# Patient Record
Sex: MALE | Race: WHITE | NOT HISPANIC OR LATINO | Employment: FULL TIME | URBAN - METROPOLITAN AREA
[De-identification: names, ages, dates, MRNs, and addresses within clinical notes are randomized per-mention and may not be internally consistent; named-entity substitution may affect disease eponyms.]

---

## 2020-04-06 ENCOUNTER — OFFICE VISIT (OUTPATIENT)
Dept: INTERNAL MEDICINE CLINIC | Facility: CLINIC | Age: 55
End: 2020-04-06
Payer: COMMERCIAL

## 2020-04-06 VITALS
DIASTOLIC BLOOD PRESSURE: 90 MMHG | BODY MASS INDEX: 29.09 KG/M2 | HEIGHT: 70 IN | OXYGEN SATURATION: 99 % | HEART RATE: 75 BPM | SYSTOLIC BLOOD PRESSURE: 150 MMHG | WEIGHT: 203.2 LBS

## 2020-04-06 DIAGNOSIS — I10 ESSENTIAL HYPERTENSION: ICD-10-CM

## 2020-04-06 DIAGNOSIS — E11.69 DIABETES MELLITUS TYPE 2 IN OBESE (HCC): Primary | ICD-10-CM

## 2020-04-06 DIAGNOSIS — N52.1 ERECTILE DYSFUNCTION DUE TO DIABETES MELLITUS (HCC): ICD-10-CM

## 2020-04-06 DIAGNOSIS — E11.69 ERECTILE DYSFUNCTION DUE TO DIABETES MELLITUS (HCC): ICD-10-CM

## 2020-04-06 DIAGNOSIS — E66.9 DIABETES MELLITUS TYPE 2 IN OBESE (HCC): Primary | ICD-10-CM

## 2020-04-06 PROCEDURE — 99214 OFFICE O/P EST MOD 30 MIN: CPT | Performed by: INTERNAL MEDICINE

## 2020-04-06 RX ORDER — SILDENAFIL 50 MG/1
50 TABLET, FILM COATED ORAL 3 TIMES WEEKLY
Qty: 6 TABLET | Refills: 1 | Status: SHIPPED | OUTPATIENT
Start: 2020-04-06 | End: 2020-07-06

## 2020-06-04 DIAGNOSIS — E11.69 DIABETES MELLITUS TYPE 2 IN OBESE (HCC): Primary | ICD-10-CM

## 2020-06-04 DIAGNOSIS — E66.9 DIABETES MELLITUS TYPE 2 IN OBESE (HCC): Primary | ICD-10-CM

## 2020-06-11 DIAGNOSIS — E66.9 DIABETES MELLITUS TYPE 2 IN OBESE (HCC): Primary | ICD-10-CM

## 2020-06-11 DIAGNOSIS — E11.69 DIABETES MELLITUS TYPE 2 IN OBESE (HCC): Primary | ICD-10-CM

## 2020-06-15 ENCOUNTER — TELEPHONE (OUTPATIENT)
Dept: INTERNAL MEDICINE CLINIC | Facility: CLINIC | Age: 55
End: 2020-06-15

## 2020-06-15 DIAGNOSIS — E66.9 DIABETES MELLITUS TYPE 2 IN OBESE (HCC): Primary | ICD-10-CM

## 2020-06-15 DIAGNOSIS — E11.69 DIABETES MELLITUS TYPE 2 IN OBESE (HCC): Primary | ICD-10-CM

## 2020-07-03 DIAGNOSIS — E11.69 ERECTILE DYSFUNCTION DUE TO DIABETES MELLITUS (HCC): ICD-10-CM

## 2020-07-03 DIAGNOSIS — N52.1 ERECTILE DYSFUNCTION DUE TO DIABETES MELLITUS (HCC): ICD-10-CM

## 2020-07-06 RX ORDER — SILDENAFIL 50 MG/1
TABLET, FILM COATED ORAL
Qty: 6 TABLET | Refills: 1 | Status: SHIPPED | OUTPATIENT
Start: 2020-07-06 | End: 2020-08-04 | Stop reason: DRUGHIGH

## 2020-07-29 ENCOUNTER — TELEPHONE (OUTPATIENT)
Dept: OTHER | Facility: OTHER | Age: 55
End: 2020-07-29

## 2020-08-04 ENCOUNTER — OFFICE VISIT (OUTPATIENT)
Dept: INTERNAL MEDICINE CLINIC | Facility: CLINIC | Age: 55
End: 2020-08-04
Payer: COMMERCIAL

## 2020-08-04 VITALS
HEART RATE: 72 BPM | WEIGHT: 205.4 LBS | SYSTOLIC BLOOD PRESSURE: 128 MMHG | BODY MASS INDEX: 29.41 KG/M2 | OXYGEN SATURATION: 98 % | TEMPERATURE: 98.1 F | DIASTOLIC BLOOD PRESSURE: 86 MMHG | HEIGHT: 70 IN

## 2020-08-04 DIAGNOSIS — E66.9 DIABETES MELLITUS TYPE 2 IN OBESE (HCC): Primary | ICD-10-CM

## 2020-08-04 DIAGNOSIS — N52.1 ERECTILE DYSFUNCTION DUE TO DIABETES MELLITUS (HCC): ICD-10-CM

## 2020-08-04 DIAGNOSIS — I10 ESSENTIAL HYPERTENSION: ICD-10-CM

## 2020-08-04 DIAGNOSIS — E11.69 DIABETES MELLITUS TYPE 2 IN OBESE (HCC): Primary | ICD-10-CM

## 2020-08-04 DIAGNOSIS — E11.69 ERECTILE DYSFUNCTION DUE TO DIABETES MELLITUS (HCC): ICD-10-CM

## 2020-08-04 PROCEDURE — 1036F TOBACCO NON-USER: CPT | Performed by: INTERNAL MEDICINE

## 2020-08-04 PROCEDURE — 3079F DIAST BP 80-89 MM HG: CPT | Performed by: INTERNAL MEDICINE

## 2020-08-04 PROCEDURE — 3008F BODY MASS INDEX DOCD: CPT | Performed by: INTERNAL MEDICINE

## 2020-08-04 PROCEDURE — 99214 OFFICE O/P EST MOD 30 MIN: CPT | Performed by: INTERNAL MEDICINE

## 2020-08-04 PROCEDURE — 3074F SYST BP LT 130 MM HG: CPT | Performed by: INTERNAL MEDICINE

## 2020-08-04 PROCEDURE — 3725F SCREEN DEPRESSION PERFORMED: CPT | Performed by: INTERNAL MEDICINE

## 2020-08-04 RX ORDER — SILDENAFIL 100 MG/1
TABLET, FILM COATED ORAL
Qty: 10 TABLET | Refills: 1 | Status: SHIPPED | OUTPATIENT
Start: 2020-08-04 | End: 2021-02-04

## 2020-08-04 RX ORDER — GLUCOSAMINE HCL/CHONDROITIN SU 500-400 MG
CAPSULE ORAL
COMMUNITY
Start: 2016-09-23 | End: 2021-06-01 | Stop reason: SDUPTHER

## 2020-08-04 RX ORDER — OMEPRAZOLE 20 MG/1
CAPSULE, DELAYED RELEASE ORAL
COMMUNITY
Start: 2011-06-30 | End: 2021-05-12 | Stop reason: SDUPTHER

## 2020-08-04 RX ORDER — METFORMIN HYDROCHLORIDE EXTENDED-RELEASE TABLETS 1000 MG/1
TABLET, FILM COATED, EXTENDED RELEASE ORAL
COMMUNITY
Start: 2017-01-17 | End: 2020-11-20 | Stop reason: SDUPTHER

## 2020-08-04 NOTE — PROGRESS NOTES
Assessment/Plan:         Problem List Items Addressed This Visit        Endocrine    Diabetes mellitus type 2 in obese ([facility] ) - Primary       No results found for: HGBA1C   Did not go for labs- script was given  Will give new script  Relevant Medications    metFORMIN (FORTAMET) 1000 MG (OSM) 24 hr tablet    Other Relevant Orders    HEMOGLOBIN A1C W/ EAG ESTIMATION    Basic metabolic panel    Erectile dysfunction due to diabetes mellitus ([facility] )       No results found for: HGBA1C  viagra 50 mg helped 1st time, then not helping much  Tolerated well  testosteron did not help  Try vigra 100 mg 2x/week prn- s/e d/w pt again  Recheck testosteron level due to libido issues         Relevant Medications    metFORMIN (FORTAMET) 1000 MG (OSM) 24 hr tablet    sildenafil (VIAGRA) 100 mg tablet    Other Relevant Orders    Testosterone, free, total       Cardiovascular and Mediastinum    Essential hypertension     Controlled  Subjective:      Patient ID: Frank Brantley is a 49 y o  male  1  Dm-2-patient still did not go for blood work  I have given him counseling about importance of regular blood work checked  He tolerating medicine well  No polyuria or polydipsia  No hypoglycemia symptoms  No numbness or tingling  No chest pain or shortness of breath  No significant weight changes  No fatigue or weakness  No abdominal pain nausea or vomiting  No change in the vision  No headaches  No lightheadedness  2  htn-blood pressure under control with current medication lisinopril  Tolerating medicine well  No headache dizziness or lightheadedness  No lower extremity edema  No orthopnea  No chest pain or shortness of breath  No weakness or fatigue  No palpitations  No significant weight changes  He does not drink alcohol too much  3  ED-with loss of libido  Testosterone supplement did not help last time  I will recheck testosterone level    Viagra 50 mg helped 1st time but did not help on later   He tolerated medicine well  No depressed mood  No back pain  No lower extremity weakness  No hallucination  No suicidal or homicidal ideations  No anxiety or mental stress  We will try Viagra 100 mg twice a week peggy Isidro Possible side effects discussed with patient again  The following portions of the patient's history were reviewed and updated as appropriate:   He has a past medical history of Aortic aneurysm ([facility] ), Decreased testosterone level, Diabetes mellitus ([facility] ), GERD (gastroesophageal reflux disease), Hypertension, and Reduced libido  ,  does not have any pertinent problems on file  ,   has a past surgical history that includes Colonoscopy (2015)  ,  family history includes Hypertension in his father and mother  ,   reports that he has never smoked  He has never used smokeless tobacco  He reports that he does not drink alcohol or use drugs  ,  is allergic to penicillins and clindamycin     Current Outpatient Medications   Medication Sig Dispense Refill    atorvastatin (LIPITOR) 10 mg tablet Take 10 mg by mouth daily   canagliflozin (Invokana) 100 mg Take 1 tablet (100 mg total) by mouth daily 30 tablet 2    canagliflozin (Invokana) 100 mg Take 1 tablet (100 mg total) by mouth daily before breakfast 90 tablet 0    Glucose Blood (BLOOD GLUCOSE TEST STRIPS) STRP test BS twice daily as directed      HYDROcodone-acetaminophen (NORCO) 5-325 mg per tablet Take 1 tablet by mouth every 6 (six) hours as needed for moderate pain (Not relieved by Anti-inflammatory)  Max Daily Amount: 4 tablets 20 tablet 0    lisinopril (ZESTRIL) 10 mg tablet Take 10 mg by mouth daily   metFORMIN (FORTAMET) 1000 MG (OSM) 24 hr tablet TAKE 1 TABLET(1000 MG) BY MOUTH DAILY WITH BREAKFAST      metFORMIN (GLUCOPHAGE) 500 mg tablet Take 1,000 mg by mouth 2 (two) times a day with meals          omeprazole (PriLOSEC) 20 mg delayed release capsule TAKE 1 CAPSULE(20 MG) BY MOUTH DAILY      pioglitazone (ACTOS) 15 mg tablet Take 15 mg by mouth daily   sildenafil (VIAGRA) 100 mg tablet 1 po every 3 days prn 10 tablet 1     No current facility-administered medications for this visit  Review of Systems   Constitutional: Negative for chills, fatigue and fever  HENT: Negative for congestion, nosebleeds and rhinorrhea  Eyes: Negative for discharge and visual disturbance  Respiratory: Negative for cough, chest tightness, shortness of breath and wheezing  Cardiovascular: Negative for chest pain  Gastrointestinal: Negative for abdominal distention, abdominal pain, constipation, diarrhea and vomiting  Endocrine: Negative for polydipsia and polyuria  Genitourinary: Negative for difficulty urinating, frequency and hematuria  Musculoskeletal: Negative for arthralgias, back pain and myalgias  Skin: Negative for rash  Allergic/Immunologic: Negative for environmental allergies  Neurological: Negative for dizziness, seizures, syncope, facial asymmetry, weakness, light-headedness, numbness and headaches  Hematological: Negative  Psychiatric/Behavioral: Negative for agitation, confusion, decreased concentration, dysphoric mood and suicidal ideas  The patient is not nervous/anxious  All other systems reviewed and are negative  Objective:  Vitals:    08/04/20 1410   BP: 128/86   BP Location: Left arm   Patient Position: Sitting   Cuff Size: Adult   Pulse: 72   Temp: 98 1 °F (36 7 °C)   TempSrc: Temporal   SpO2: 98%   Weight: 93 2 kg (205 lb 6 4 oz)   Height: 5' 10"     Body mass index is 29 47 kg/m²  Physical Exam   Constitutional: He is oriented to person, place, and time  He appears well-developed  No distress  HENT:   Mouth/Throat: Mucous membranes are moist    Eyes: Right eye exhibits no discharge  Left eye exhibits no discharge  Neck: No thyromegaly present  Cardiovascular: Normal rate and regular rhythm  No murmur heard    Pulmonary/Chest: Effort normal and breath sounds normal  He has no wheezes  Abdominal: Soft  Bowel sounds are normal  He exhibits no mass  There is no abdominal tenderness  Musculoskeletal:         General: No swelling or tenderness  Right lower leg: No edema  Left lower leg: No edema  Lymphadenopathy:     He has no cervical adenopathy  Neurological: He is alert and oriented to person, place, and time  No sensory deficit  Skin: Capillary refill takes less than 2 seconds  No erythema     Psychiatric: His behavior is normal  Judgment and thought content normal

## 2020-08-04 NOTE — ASSESSMENT & PLAN NOTE
No results found for: HGBA1C  viagra 50 mg helped 1st time, then not helping much  Tolerated well  testosteron did not help  Try vigra 100 mg 2x/week prn- s/e d/w pt again   Recheck testosteron level due to libido issues

## 2020-10-09 DIAGNOSIS — E11.69 DIABETES MELLITUS TYPE 2 IN OBESE (HCC): Primary | ICD-10-CM

## 2020-10-09 DIAGNOSIS — E66.9 DIABETES MELLITUS TYPE 2 IN OBESE (HCC): Primary | ICD-10-CM

## 2020-10-09 RX ORDER — EMPAGLIFLOZIN 25 MG/1
25 TABLET, FILM COATED ORAL EVERY MORNING
Qty: 30 TABLET | Refills: 1 | Status: SHIPPED | OUTPATIENT
Start: 2020-10-09 | End: 2020-11-20 | Stop reason: SDUPTHER

## 2020-10-28 DIAGNOSIS — I10 ESSENTIAL HYPERTENSION: Primary | ICD-10-CM

## 2020-10-28 PROCEDURE — 4010F ACE/ARB THERAPY RXD/TAKEN: CPT | Performed by: INTERNAL MEDICINE

## 2020-10-28 RX ORDER — LISINOPRIL 10 MG/1
10 TABLET ORAL DAILY
Qty: 90 TABLET | Refills: 1 | Status: SHIPPED | OUTPATIENT
Start: 2020-10-28 | End: 2021-08-30 | Stop reason: SDUPTHER

## 2020-11-20 ENCOUNTER — OFFICE VISIT (OUTPATIENT)
Dept: FAMILY MEDICINE CLINIC | Facility: CLINIC | Age: 55
End: 2020-11-20
Payer: COMMERCIAL

## 2020-11-20 VITALS
SYSTOLIC BLOOD PRESSURE: 120 MMHG | HEIGHT: 70 IN | RESPIRATION RATE: 16 BRPM | WEIGHT: 204 LBS | OXYGEN SATURATION: 98 % | DIASTOLIC BLOOD PRESSURE: 76 MMHG | BODY MASS INDEX: 29.2 KG/M2 | HEART RATE: 72 BPM | TEMPERATURE: 98.2 F

## 2020-11-20 DIAGNOSIS — E11.69 DIABETES MELLITUS TYPE 2 IN OBESE (HCC): Primary | ICD-10-CM

## 2020-11-20 DIAGNOSIS — I10 ESSENTIAL HYPERTENSION: ICD-10-CM

## 2020-11-20 DIAGNOSIS — E66.9 DIABETES MELLITUS TYPE 2 IN OBESE (HCC): Primary | ICD-10-CM

## 2020-11-20 LAB
SL AMB POCT HEMOGLOBIN AIC: 10.3 (ref ?–6.5)
SL AMB POCT HGB: 258

## 2020-11-20 PROCEDURE — 3078F DIAST BP <80 MM HG: CPT | Performed by: INTERNAL MEDICINE

## 2020-11-20 PROCEDURE — 85018 HEMOGLOBIN: CPT | Performed by: INTERNAL MEDICINE

## 2020-11-20 PROCEDURE — 1036F TOBACCO NON-USER: CPT | Performed by: INTERNAL MEDICINE

## 2020-11-20 PROCEDURE — 83036 HEMOGLOBIN GLYCOSYLATED A1C: CPT | Performed by: INTERNAL MEDICINE

## 2020-11-20 PROCEDURE — 3008F BODY MASS INDEX DOCD: CPT | Performed by: INTERNAL MEDICINE

## 2020-11-20 PROCEDURE — 99214 OFFICE O/P EST MOD 30 MIN: CPT | Performed by: INTERNAL MEDICINE

## 2020-11-20 PROCEDURE — 3046F HEMOGLOBIN A1C LEVEL >9.0%: CPT | Performed by: INTERNAL MEDICINE

## 2020-11-20 PROCEDURE — 3074F SYST BP LT 130 MM HG: CPT | Performed by: INTERNAL MEDICINE

## 2020-11-20 RX ORDER — METFORMIN HYDROCHLORIDE EXTENDED-RELEASE TABLETS 1000 MG/1
1000 TABLET, FILM COATED, EXTENDED RELEASE ORAL 2 TIMES DAILY WITH MEALS
Qty: 180 TABLET | Refills: 1 | Status: SHIPPED | OUTPATIENT
Start: 2020-11-20 | End: 2021-01-14

## 2020-11-20 RX ORDER — EMPAGLIFLOZIN 25 MG/1
25 TABLET, FILM COATED ORAL EVERY MORNING
Qty: 30 TABLET | Refills: 5 | Status: SHIPPED | OUTPATIENT
Start: 2020-11-20 | End: 2020-12-28 | Stop reason: SDUPTHER

## 2020-11-20 RX ORDER — PIOGLITAZONEHYDROCHLORIDE 15 MG/1
15 TABLET ORAL DAILY
Qty: 90 TABLET | Refills: 1 | Status: SHIPPED | OUTPATIENT
Start: 2020-11-20 | End: 2021-05-12 | Stop reason: SDUPTHER

## 2020-11-20 RX ORDER — ATORVASTATIN CALCIUM 10 MG/1
10 TABLET, FILM COATED ORAL DAILY
Qty: 90 TABLET | Refills: 1 | Status: SHIPPED | OUTPATIENT
Start: 2020-11-20

## 2020-11-23 ENCOUNTER — TELEPHONE (OUTPATIENT)
Dept: FAMILY MEDICINE CLINIC | Facility: CLINIC | Age: 55
End: 2020-11-23

## 2020-12-16 PROBLEM — E11.65 TYPE 2 DIABETES MELLITUS WITH HYPERGLYCEMIA (HCC): Status: ACTIVE | Noted: 2020-12-16

## 2020-12-28 DIAGNOSIS — E66.9 DIABETES MELLITUS TYPE 2 IN OBESE (HCC): ICD-10-CM

## 2020-12-28 DIAGNOSIS — E11.69 DIABETES MELLITUS TYPE 2 IN OBESE (HCC): ICD-10-CM

## 2021-01-03 RX ORDER — EMPAGLIFLOZIN 25 MG/1
25 TABLET, FILM COATED ORAL EVERY MORNING
Qty: 30 TABLET | Refills: 5
Start: 2021-01-03 | End: 2021-08-30 | Stop reason: SDUPTHER

## 2021-01-10 ENCOUNTER — APPOINTMENT (EMERGENCY)
Dept: RADIOLOGY | Facility: HOSPITAL | Age: 56
End: 2021-01-10
Payer: COMMERCIAL

## 2021-01-10 ENCOUNTER — HOSPITAL ENCOUNTER (EMERGENCY)
Facility: HOSPITAL | Age: 56
Discharge: HOME | End: 2021-01-10
Attending: EMERGENCY MEDICINE | Admitting: EMERGENCY MEDICINE
Payer: COMMERCIAL

## 2021-01-10 VITALS
DIASTOLIC BLOOD PRESSURE: 76 MMHG | HEART RATE: 74 BPM | RESPIRATION RATE: 20 BRPM | OXYGEN SATURATION: 99 % | SYSTOLIC BLOOD PRESSURE: 137 MMHG | TEMPERATURE: 97.1 F

## 2021-01-10 DIAGNOSIS — R07.9 CHEST PAIN: ICD-10-CM

## 2021-01-10 DIAGNOSIS — R53.1 WEAKNESS: Primary | ICD-10-CM

## 2021-01-10 DIAGNOSIS — I71.2 ANEURYSM OF THORACIC AORTA (HCC): ICD-10-CM

## 2021-01-10 DIAGNOSIS — R55 SYNCOPE: ICD-10-CM

## 2021-01-10 DIAGNOSIS — R41.82 AMS (ALTERED MENTAL STATUS): ICD-10-CM

## 2021-01-10 LAB
ALBUMIN SERPL BCP-MCNC: 3.5 G/DL (ref 3.5–5)
ALP SERPL-CCNC: 85 U/L (ref 46–116)
ALT SERPL W P-5'-P-CCNC: 23 U/L (ref 12–78)
ANION GAP SERPL CALCULATED.3IONS-SCNC: 9 MMOL/L (ref 4–13)
AST SERPL W P-5'-P-CCNC: 9 U/L (ref 5–45)
ATRIAL RATE: 90 BPM
BASOPHILS # BLD AUTO: 0.07 THOUSANDS/ΜL (ref 0–0.1)
BASOPHILS NFR BLD AUTO: 1 % (ref 0–1)
BILIRUB SERPL-MCNC: 0.45 MG/DL (ref 0.2–1)
BUN SERPL-MCNC: 20 MG/DL (ref 5–25)
CALCIUM SERPL-MCNC: 9.3 MG/DL (ref 8.3–10.1)
CHLORIDE SERPL-SCNC: 107 MMOL/L (ref 100–108)
CO2 SERPL-SCNC: 22 MMOL/L (ref 21–32)
CREAT SERPL-MCNC: 1.07 MG/DL (ref 0.6–1.3)
EOSINOPHIL # BLD AUTO: 0.06 THOUSAND/ΜL (ref 0–0.61)
EOSINOPHIL NFR BLD AUTO: 1 % (ref 0–6)
ERYTHROCYTE [DISTWIDTH] IN BLOOD BY AUTOMATED COUNT: 13.3 % (ref 11.6–15.1)
FLUAV RNA RESP QL NAA+PROBE: NEGATIVE
FLUBV RNA RESP QL NAA+PROBE: NEGATIVE
GFR SERPL CREATININE-BSD FRML MDRD: 78 ML/MIN/1.73SQ M
GLUCOSE SERPL-MCNC: 301 MG/DL (ref 65–140)
HCT VFR BLD AUTO: 49.7 % (ref 36.5–49.3)
HGB BLD-MCNC: 15.7 G/DL (ref 12–17)
IMM GRANULOCYTES # BLD AUTO: 0.03 THOUSAND/UL (ref 0–0.2)
IMM GRANULOCYTES NFR BLD AUTO: 1 % (ref 0–2)
LYMPHOCYTES # BLD AUTO: 1.97 THOUSANDS/ΜL (ref 0.6–4.47)
LYMPHOCYTES NFR BLD AUTO: 33 % (ref 14–44)
MCH RBC QN AUTO: 25.7 PG (ref 26.8–34.3)
MCHC RBC AUTO-ENTMCNC: 31.6 G/DL (ref 31.4–37.4)
MCV RBC AUTO: 81 FL (ref 82–98)
MONOCYTES # BLD AUTO: 0.44 THOUSAND/ΜL (ref 0.17–1.22)
MONOCYTES NFR BLD AUTO: 7 % (ref 4–12)
NEUTROPHILS # BLD AUTO: 3.5 THOUSANDS/ΜL (ref 1.85–7.62)
NEUTS SEG NFR BLD AUTO: 57 % (ref 43–75)
NRBC BLD AUTO-RTO: 0 /100 WBCS
P AXIS: 70 DEGREES
PLATELET # BLD AUTO: 265 THOUSANDS/UL (ref 149–390)
PMV BLD AUTO: 11.5 FL (ref 8.9–12.7)
POTASSIUM SERPL-SCNC: 3.4 MMOL/L (ref 3.5–5.3)
PR INTERVAL: 164 MS
PROT SERPL-MCNC: 7.8 G/DL (ref 6.4–8.2)
QRS AXIS: 17 DEGREES
QRSD INTERVAL: 74 MS
QT INTERVAL: 330 MS
QTC INTERVAL: 403 MS
RBC # BLD AUTO: 6.11 MILLION/UL (ref 3.88–5.62)
RSV RNA RESP QL NAA+PROBE: NEGATIVE
SARS-COV-2 RNA RESP QL NAA+PROBE: NEGATIVE
SODIUM SERPL-SCNC: 138 MMOL/L (ref 136–145)
T WAVE AXIS: 61 DEGREES
TROPONIN I SERPL-MCNC: <0.02 NG/ML
VENTRICULAR RATE: 90 BPM
WBC # BLD AUTO: 6.07 THOUSAND/UL (ref 4.31–10.16)

## 2021-01-10 PROCEDURE — 70498 CT ANGIOGRAPHY NECK: CPT

## 2021-01-10 PROCEDURE — 85025 COMPLETE CBC W/AUTO DIFF WBC: CPT | Performed by: EMERGENCY MEDICINE

## 2021-01-10 PROCEDURE — 0241U HB NFCT DS VIR RESP RNA 4 TRGT: CPT | Performed by: EMERGENCY MEDICINE

## 2021-01-10 PROCEDURE — 80053 COMPREHEN METABOLIC PANEL: CPT | Performed by: EMERGENCY MEDICINE

## 2021-01-10 PROCEDURE — 93010 ELECTROCARDIOGRAM REPORT: CPT | Performed by: INTERNAL MEDICINE

## 2021-01-10 PROCEDURE — 71045 X-RAY EXAM CHEST 1 VIEW: CPT

## 2021-01-10 PROCEDURE — 93005 ELECTROCARDIOGRAM TRACING: CPT

## 2021-01-10 PROCEDURE — 84484 ASSAY OF TROPONIN QUANT: CPT | Performed by: EMERGENCY MEDICINE

## 2021-01-10 PROCEDURE — 96361 HYDRATE IV INFUSION ADD-ON: CPT

## 2021-01-10 PROCEDURE — 96360 HYDRATION IV INFUSION INIT: CPT

## 2021-01-10 PROCEDURE — 36415 COLL VENOUS BLD VENIPUNCTURE: CPT | Performed by: EMERGENCY MEDICINE

## 2021-01-10 PROCEDURE — 99285 EMERGENCY DEPT VISIT HI MDM: CPT | Performed by: EMERGENCY MEDICINE

## 2021-01-10 PROCEDURE — 70496 CT ANGIOGRAPHY HEAD: CPT

## 2021-01-10 PROCEDURE — G1004 CDSM NDSC: HCPCS

## 2021-01-10 PROCEDURE — 99284 EMERGENCY DEPT VISIT MOD MDM: CPT

## 2021-01-10 RX ADMIN — SODIUM CHLORIDE 1000 ML: 0.9 INJECTION, SOLUTION INTRAVENOUS at 17:29

## 2021-01-10 RX ADMIN — IOHEXOL 85 ML: 350 INJECTION, SOLUTION INTRAVENOUS at 18:17

## 2021-01-10 NOTE — ED ATTENDING ATTESTATION
1/10/2021  IMari MD, saw and evaluated the patient  I have discussed the patient with the resident/non-physician practitioner and agree with the resident's/non-physician practitioner's findings, Plan of Care, and MDM as documented in the resident's/non-physician practitioner's note, except where noted  All available labs and Radiology studies were reviewed  I was present for key portions of any procedure(s) performed by the resident/non-physician practitioner and I was immediately available to provide assistance  At this point I agree with the current assessment done in the Emergency Department  I have conducted an independent evaluation of this patient a history and physical is as follows:  Unreliable historian  Per EMS and per wife, patient was found on the ground writhing around  Patient was speaking [place] a baby, and acting bizarrely  Patient states that he has not felt well for 2 weeks  States that he has been having some shortness of breath and chest discomfort  States that he feels globally weak  Patient's review of systems is very unreliable, patient is slow to answer, and does not answer all questions  Patient does have history of diabetes and hyperlipidemia  On exam the patient has stable vital signs  He has normal work of breathing and good color  The patient is able to attend, and has intact conjugate gaze  His face is symmetric  He has no signs of trauma to the head or neck  His neck is supple and nontender  The patient's heart is regular without murmurs, rubs, or gallops  His lungs are clear with good air movement  His abdomen is soft and nontender without masses, rebound, or guarding  Extremities are intact with no lateralizing edema  He has good pulses  The patient is neurologically nonfocal, but seems globally weak and requires some assistance to stand  Impression:  Acute altered mental status, shortness of breath, chest discomfort    The cardiac evaluation, CT CTA neck, COVID swab, reassess    ED Course         Critical Care Time  Procedures

## 2021-01-10 NOTE — Clinical Note
Jean-Pierresarahdionte Mattson was seen and treated in our emergency department on 1/10/2021  Diagnosis:     Boris Powell  may return to work on return date  He may return on this date: 01/12/2021         If you have any questions or concerns, please don't hesitate to call        Lisa Cuevas MD    ______________________________           _______________          _______________  Hospital Representative                              Date                                Time

## 2021-01-10 NOTE — Clinical Note
accompanied Narendra Alvarado to the emergency department on 1/10/2021  Return date if applicable: 63/10/9319        If you have any questions or concerns, please don't hesitate to call        Mary Villanueva MD

## 2021-01-10 NOTE — ED NOTES
Per EMS, patient was found writhing on the floor refusing to speak to anyone  Patient became aggressive in ambulance but was able to be verbally deescelated  At the time of 911 activation pt was smoking hookah, denies drug/alcohol use today  Patient is adamant about not wanting his wife present        Katja Rodarte RN  01/10/21 4445

## 2021-01-11 NOTE — DISCHARGE INSTRUCTIONS
Patient Instructions: Today you were seen in the emergency department for possible shaking vs syncope  We reviewed your CT and your labs and determined that you would be able to be discharged  You should follow up with your PCP  Please return to the emergency department if your symptoms get worse, you develop a fever, or you have any other symptoms we discussed today prior to discharge  Nice to meet you! Best of luck with everything!

## 2021-01-11 NOTE — ED PROVIDER NOTES
Final Diagnosis:  1  Weakness    2  Aneurysm of thoracic aorta ([address] )    3  Syncope    4  AMS (altered mental status)    5  Chest pain      ED Course as of Diego 10 2144   Albino Scale Diego 10, 2021   1723 Procedure Note: EKG  Date/Time: 01/10/21 1700   Interpreted by: Cierra Mendes  Indications / Diagnosis: CP  ECG reviewed by me, the ED Provider: yes   The EKG demonstrates:  Rhythm: normal sinus  Intervals: normal intervals  Axis: normal axis  QRS/Blocks: normal QRS  ST Changes: No acute ST Changes, no STD/MICAELA           1857 SARS-COV-2: Negative   1906 Stable 4 6 cm aneurysm of the ascending thoracic aorta  CTA head and neck with and without contrast     Chief Complaint   Patient presents with    Medical Problem     [facility] s/p episode of "writhing on the floor like a temper tantrum",  for EMS, ETCO2 22  pt without physical complaints  ASSESSMENT + PLAN:   - Nursing note reviewed  1  Syncope?/AMS  -patient apparently passed out was found on the floor  -some potential neurologic symptoms will order CTA  -basic labs including cardiac workup  -IV fluids  -Chest x-ray  -reassessment  -negative imaging and labs  -patient able to ambulate and feeling much improved    Final Dispo   Patient was reassessed  Vital signs stable  Patient and/or family given discharge instructions and return precautions  Patient and/or family was reassured  The patient and/or family vocalizes understanding  Answered all of the patient's and/or family's questions  Will follow up with PCP  Patient and/or family are agreeable to the plan          Medications   sodium chloride 0 9 % bolus 1,000 mL (0 mL Intravenous Stopped 1/10/21 1929)   iohexol (OMNIPAQUE) 350 MG/ML injection (MULTI-DOSE) 85 mL (85 mL Intravenous Given 1/10/21 1817)     Time reflects when diagnosis was documented in both MDM as applicable and the Disposition within this note     Time User Action Codes Description Comment    1/10/2021  8:10 PM Luis Tim Abo [I71 2] Aneurysm of thoracic aorta ([facility] [address] )     1/10/2021  8:11 PM Nay Brigido Add [R55] Syncope     1/10/2021  8:11 PM Nay Brigido Add [R41 82] AMS (altered mental status)     1/10/2021  8:11 PM HaZack richards Modify [I71 2] Aneurysm of thoracic aorta ([facility] [address] )     1/10/2021  8:11 PM HaZack richards Modify [R55] Syncope     1/10/2021  8:11 PM Nay Brigido Add [R07 9] Chest pain     1/10/2021  9:43 PM HaZack richards Add [R53 1] Weakness     1/10/2021  9:43 PM Nay Brigido Modify [R55] Syncope     1/10/2021  9:43 PM Nay Brigido Modify [R53 1] Weakness       ED Disposition     ED Disposition Condition Date/Time Comment    Discharge Stable Sun Diego 10, 2021 Jyoti Lee discharge to home/self care  Follow-up Information     Follow up With Specialties Details Why [address], [place] Internal Medicine   [place] [phone]          Discharge Medication List as of 1/10/2021  8:12 PM      CONTINUE these medications which have NOT CHANGED    Details   atorvastatin (LIPITOR) 10 mg tablet Take 1 tablet (10 mg total) by mouth daily, Starting Fri 11/20/2020, Normal      Empagliflozin (Jardiance) 25 MG TABS Take 1 tablet (25 mg total) by mouth every morning, Starting Sun 1/3/2021, Until Tue 2/2/2021, No Print      Glucose Blood (BLOOD GLUCOSE TEST STRIPS) STRP test BS twice daily as directed, Historical Med      HYDROcodone-acetaminophen (NORCO) 5-325 mg per tablet Take 1 tablet by mouth every 6 (six) hours as needed for moderate pain (Not relieved by Anti-inflammatory)   Max Daily Amount: 4 tablets, Starting Sat 1/23/2016, Print      lisinopril (ZESTRIL) 10 mg tablet Take 1 tablet (10 mg total) by mouth daily, Starting Wed 10/28/2020, Normal      metFORMIN (FORTAMET) 1000 MG (OSM) 24 hr tablet Take 1 tablet (1,000 mg total) by mouth 2 (two) times a day with meals, Starting Fri 11/20/2020, Normal      omeprazole (PriLOSEC) 20 mg delayed release capsule TAKE 1 CAPSULE(20 MG) BY MOUTH DAILY, Historical Med      pioglitazone (ACTOS) 15 mg tablet Take 1 tablet (15 mg total) by mouth daily, Starting Fri 2020, Normal      sildenafil (VIAGRA) 100 mg tablet 1 po every 3 days prn, Normal           No discharge procedures on file  Prior to Admission Medications   Prescriptions Last Dose Informant Patient Reported? Taking? Empagliflozin (Jardiance) 25 MG TABS   No No   Sig: Take 1 tablet (25 mg total) by mouth every morning   Glucose Blood (BLOOD GLUCOSE TEST STRIPS) STRP   Yes No   Sig: test BS twice daily as directed   HYDROcodone-acetaminophen (NORCO) 5-325 mg per tablet   No No   Sig: Take 1 tablet by mouth every 6 (six) hours as needed for moderate pain (Not relieved by Anti-inflammatory)  Max Daily Amount: 4 tablets   Patient not taking: Reported on 2020   atorvastatin (LIPITOR) 10 mg tablet   No No   Sig: Take 1 tablet (10 mg total) by mouth daily   lisinopril (ZESTRIL) 10 mg tablet   No No   Sig: Take 1 tablet (10 mg total) by mouth daily   metFORMIN (FORTAMET) 1000 MG (OSM) 24 hr tablet   No No   Sig: Take 1 tablet (1,000 mg total) by mouth 2 (two) times a day with meals   omeprazole (PriLOSEC) 20 mg delayed release capsule   Yes No   Sig: TAKE 1 CAPSULE(20 MG) BY MOUTH DAILY   pioglitazone (ACTOS) 15 mg tablet   No No   Sig: Take 1 tablet (15 mg total) by mouth daily   sildenafil (VIAGRA) 100 mg tablet   No No   Si po every 3 days prn      Facility-Administered Medications: None       History of Present Illness: This is a 48 y o  male presenting today for evaluation of possible syncopal versus shaking  Does not sound like the patient a seizure as he did not postictal period, bladder incontinence or tongue biting  Patient says that for the past week he has had generalized weakness  Says that not been feeling as well  Denies any recent fever or chills  Denies any nausea or vomiting  No diarrhea      For his wife, patient was found on the floor  He may have had some shaking  His son found him and said that he was speaking like a baby  He was also having generalized shaking but no mental status change  - No language barrier    - History obtained from patient and chart   - There are no limitations to the history obtained  - Previous charting was reviewed  Some data reviewed included below for ease of access whether or not it is relevant to this patient encounter  Past Medical, Past Surgical History:    has a past medical history of Aortic aneurysm ([facility] [address] ), Decreased testosterone level, Diabetes mellitus ([facility] [address] ), GERD (gastroesophageal reflux disease), High cholesterol, Hyperlipidemia, Hypertension, and Reduced libido    has a past surgical history that includes Colonoscopy (2015)  Allergies: Allergies   Allergen Reactions    Penicillins Swelling    Clindamycin Hives       Social and Family History:     Social History     Substance and Sexual Activity   Alcohol Use No     Social History     Tobacco Use   Smoking Status Current Some Day Smoker   Smokeless Tobacco Never Used   Tobacco Comment    hookah, today      Social History     Substance and Sexual Activity   Drug Use No       Review of Systems:   Review of Systems   Constitutional: Negative for chills, diaphoresis and fever  HENT: Negative  Eyes: Negative  Negative for visual disturbance  Respiratory: Positive for shortness of breath  Cardiovascular: Positive for chest pain  Gastrointestinal: Negative  Negative for abdominal pain, nausea and vomiting  Endocrine: Negative  Genitourinary: Negative  Musculoskeletal: Negative  Negative for myalgias  Skin: Negative  Negative for rash  Allergic/Immunologic: Negative  Neurological: Positive for syncope and weakness  Negative for light-headedness and numbness  Hematological: Negative  Psychiatric/Behavioral: Negative  All other systems reviewed and are negative         Physical Examination Vitals:    01/10/21 1637 01/10/21 1800 01/10/21 1900   BP: 162/96 160/100 137/76   BP Location:  Right arm Right arm   Pulse: 94 74 74   Resp: 20 20 20   Temp: (!) 97 1 °F (36 2 °C)     TempSrc: Tympanic     SpO2: 100% 98% 99%     Vitals reviewed by me  Physical Exam  Vitals signs and nursing note reviewed  Constitutional:       Appearance: He is well-developed  HENT:      Head: Normocephalic and atraumatic  Right Ear: External ear normal       Left Ear: External ear normal    Eyes:      Conjunctiva/sclera: Conjunctivae normal       Pupils: Pupils are equal, round, and reactive to light  Neck:      Musculoskeletal: Normal range of motion and neck supple  Cardiovascular:      Rate and Rhythm: Normal rate  Pulmonary:      Effort: Pulmonary effort is normal       Breath sounds: Normal breath sounds  Abdominal:      General: There is no distension  Palpations: Abdomen is soft  Tenderness: There is no abdominal tenderness  Musculoskeletal: Normal range of motion  Skin:     General: Skin is warm and dry  Neurological:      Mental Status: He is alert and oriented to person, place, and time  Cranial Nerves: No cranial nerve deficit  Sensory: No sensory deficit  Motor: No abnormal muscle tone        Coordination: Coordination normal       Deep Tendon Reflexes: Reflexes normal       Comments: Difficulty ambulating at first; able to ambulate without problems on reassessment          HEART Risk Score      Most Recent Value   Heart Score Risk Calculator   History  0 Filed at: 01/10/2021 2141   ECG  0 Filed at: 01/10/2021 2141   Age  1 Filed at: 01/10/2021 2141   Risk Factors  1 Filed at: 01/10/2021 2141   Troponin  0 Filed at: 01/10/2021 2141   HEART Score  2 Filed at: 01/10/2021 2141                       ED Course as of Diego 10 2144   Sun Diego 10, 2021   1723 Procedure Note: EKG  Date/Time: 01/10/21 1700   Interpreted by: Alysia Mobley  Indications / Diagnosis: CP  ECG reviewed by me, the ED Provider: yes   The EKG demonstrates:  Rhythm: normal sinus  Intervals: normal intervals  Axis: normal axis  QRS/Blocks: normal QRS  ST Changes: No acute ST Changes, no STD/MICAELA           1857 SARS-COV-2: Negative   1906 Stable 4 6 cm aneurysm of the ascending thoracic aorta  CTA head and neck with and without contrast     CTA head and neck with and without contrast   Final Result      No acute intracranial pathology  No significant stenosis of the cervical carotid or vertebral arteries   No significant intracranial stenosis, vessel occlusion or aneurysm  Stable 4 6 cm aneurysm of the ascending thoracic aorta                 Workstation performed: [id number]         XR chest 1 view portable    (Results Pending)     Orders Placed This Encounter   Procedures    COVID19, Influenza A/B, RSV PCR, [facility]    CTA head and neck with and without contrast    XR chest 1 view portable    CBC and differential    Troponin I    Comprehensive metabolic panel    EKG RESULTS    ECG 12 lead    ECG 12 lead       Labs:     Labs Reviewed   CBC AND DIFFERENTIAL - Abnormal       Result Value Ref Range Status    WBC 6 07  4 31 - 10 16 Thousand/uL Final    RBC 6 11 (*) 3 88 - 5 62 Million/uL Final    Hemoglobin 15 7  12 0 - 17 0 g/dL Final    Hematocrit 49 7 (*) 36 5 - 49 3 % Final    MCV 81 (*) 82 - 98 fL Final    MCH 25 7 (*) 26 8 - 34 3 pg Final    MCHC 31 6  31 4 - 37 4 g/dL Final    RDW 13 3  11 6 - 15 1 % Final    MPV 11 5  8 9 - 12 7 fL Final    Platelets 080  574 - 390 Thousands/uL Final    nRBC 0  /100 WBCs Final    Neutrophils Relative 57  43 - 75 % Final    Immat GRANS % 1  0 - 2 % Final    Lymphocytes Relative 33  14 - 44 % Final    Monocytes Relative 7  4 - 12 % Final    Eosinophils Relative 1  0 - 6 % Final    Basophils Relative 1  0 - 1 % Final    Neutrophils Absolute 3 50  1 85 - 7 62 Thousands/µL Final    Immature Grans Absolute 0 03  0 00 - 0 20 Thousand/uL Final    Lymphocytes Absolute 1 97  0 60 - 4 47 Thousands/µL Final    Monocytes Absolute 0 44  0 17 - 1 22 Thousand/µL Final    Eosinophils Absolute 0 06  0 00 - 0 61 Thousand/µL Final    Basophils Absolute 0 07  0 00 - 0 10 Thousands/µL Final   COMPREHENSIVE METABOLIC PANEL - Abnormal    Sodium 138  136 - 145 mmol/L Final    Potassium 3 4 (*) 3 5 - 5 3 mmol/L Final    Chloride 107  100 - 108 mmol/L Final    CO2 22  21 - 32 mmol/L Final    ANION GAP 9  4 - 13 mmol/L Final    BUN 20  5 - 25 mg/dL Final    Creatinine 1 07  0 60 - 1 30 mg/dL Final    Comment: Standardized to IDMS reference method    Glucose 301 (*) 65 - 140 mg/dL Final    Comment: If the patient is fasting, the ADA then defines impaired fasting glucose as > 100 mg/dL and diabetes as > or equal to 123 mg/dL  Specimen collection should occur prior to Sulfasalazine administration due to the potential for falsely depressed results  Specimen collection should occur prior to Sulfapyridine administration due to the potential for falsely elevated results  Calcium 9 3  8 3 - 10 1 mg/dL Final    AST 9  5 - 45 U/L Final    Comment: Specimen collection should occur prior to Sulfasalazine administration due to the potential for falsely depressed results  ALT 23  12 - 78 U/L Final    Comment: Specimen collection should occur prior to Sulfasalazine and/or Sulfapyridine administration due to the potential for falsely depressed results  Alkaline Phosphatase 85  46 - 116 U/L Final    Total Protein 7 8  6 4 - 8 2 g/dL Final    Albumin 3 5  3 5 - 5 0 g/dL Final    Total Bilirubin 0 45  0 20 - 1 00 mg/dL Final    Comment: Use of this assay is not recommended for patients undergoing treatment with eltrombopag due to the potential for falsely elevated results      eGFR 78  ml/min/1 73sq m Final    Narrative:     [place] guidelines for Chronic Kidney Disease (CKD):     Stage 1 with normal or high GFR (GFR > 90 mL/min/1 73 square meters)    Stage 2 Mild CKD (GFR = 60-89 mL/min/1 73 square meters)    Stage 3A Moderate CKD (GFR = 45-59 mL/min/1 73 square meters)    Stage 3B Moderate CKD (GFR = 30-44 mL/min/1 73 square meters)    Stage 4 Severe CKD (GFR = 15-29 mL/min/1 73 square meters)    Stage 5 End Stage CKD (GFR <15 mL/min/1 73 square meters)  Note: GFR calculation is accurate only with a steady state creatinine   COVID19, INFLUENZA A/B, RSV PCR, [facility] - Normal    SARS-CoV-2 Negative  Negative Final    INFLUENZA A PCR Negative  Negative Final    INFLUENZA B PCR Negative  Negative Final    RSV PCR Negative  Negative Final    Narrative: This test has been authorized by FDA under an EUA (Emergency Use Assay) for use by authorized laboratories  Clinical caution and judgement should be used with the interpretation of these results with consideration of the clinical impression and other laboratory testing  Testing reported as "Positive" or "Negative" has been proven to be accurate according to standard laboratory validation requirements  All testing is performed with control materials showing appropriate reactivity at standard intervals  TROPONIN I - Normal    Troponin I <0 02  <=0 04 ng/mL Final    Comment: Siemens Chemistry analyzer 99% cutoff is > 0 04 ng/mL in network labs     o cTnI 99% cutoff is useful only when applied to patients in the clinical setting of myocardial ischemia   o cTnI 99% cutoff should be interpreted in the context of clinical history, ECG findings and possibly cardiac imaging to establish correct diagnosis  o cTnI 99% cutoff may be suggestive but clearly not indicative of a coronary event without the clinical setting of myocardial ischemia  Imaging:   Cta Head And Neck With And Without Contrast    Result Date: 1/10/2021  Narrative: CTA NECK AND BRAIN WITH AND WITHOUT CONTRAST INDICATION: weakness; possible seizure activity with slurring COMPARISON:   Head CT dated 20/7/2011   TECHNIQUE:  Routine CT imaging of the Brain without contrast  Post contrast imaging was performed after administration of iodinated contrast through the neck and brain  Post contrast axial 0 625 mm images timed to opacify the arterial system  3D rendering was performed on an independent workstation  MIP reconstructions performed  Coronal reconstructions were performed of the noncontrast portion of the brain  Radiation dose length product (DLP) for this visit:  1470 86 mGy-cm   This examination, like all CT scans performed in the [facility], was performed utilizing techniques to minimize radiation dose exposure, including the use of iterative reconstruction and automated exposure control  IV Contrast:  85 mL of iohexol (OMNIPAQUE)  IMAGE QUALITY:   Diagnostic FINDINGS: NONCONTRAST BRAIN PARENCHYMA:  No intracranial mass, mass effect or midline shift  No CT signs of acute infarction  No acute parenchymal hemorrhage  VENTRICLES AND EXTRA-AXIAL SPACES:  Normal for the patient's age  VISUALIZED ORBITS AND PARANASAL SINUSES:  Mucosal thickening and retention cyst in the right maxillary sinus  No fluid levels  CERVICAL VASCULATURE AORTIC ARCH AND GREAT VESSELS: 4 6 cm aneurysm of the ascending thoracic aorta is stable compared with chest CT dated 5/6/2016  Normal great vessel origins  Normal visualized subclavian vessels  RIGHT VERTEBRAL ARTERY CERVICAL SEGMENT:  Normal origin  The vessel is normal in caliber throughout the neck  LEFT VERTEBRAL ARTERY CERVICAL SEGMENT: Dominant  Normal origin  The vessel is normal in caliber throughout the neck  RIGHT EXTRACRANIAL CAROTID SEGMENT:  Normal caliber common carotid artery  Normal bifurcation and cervical internal carotid artery  No stenosis or dissection  LEFT EXTRACRANIAL CAROTID SEGMENT:  Normal caliber common carotid artery  Normal bifurcation and cervical internal carotid artery  No stenosis or dissection  NASCET criteria was used to determine the degree of internal carotid artery diameter stenosis  INTRACRANIAL VASCULATURE INTERNAL CAROTID ARTERIES:  Normal enhancement of the intracranial portions of the internal carotid arteries  Normal ophthalmic artery origins  Normal ICA terminus  ANTERIOR CIRCULATION:  Symmetric A1 segments and anterior cerebral arteries with normal enhancement  Normal anterior communicating artery  MIDDLE CEREBRAL ARTERY CIRCULATION:  M1 segment and middle cerebral artery branches demonstrate normal enhancement bilaterally  DISTAL VERTEBRAL ARTERIES: No significant vertebral artery stenosis  Right vertebral artery is hypoplastic distal to PICA  Csome Pipe Posterior inferior cerebellar artery origins are normal  Normal vertebral basilar junction  BASILAR ARTERY:  Basilar artery is normal in caliber  Normal superior cerebellar arteries  POSTERIOR CEREBRAL ARTERIES: The right posterior cerebral artery arises from the basilar tip  There is fetal origin of the left posterior cerebral artery  Both demonstrate no focal stenosis  Normal posterior communicating arteries  DURAL VENOUS SINUSES:  Normal  NON VASCULAR ANATOMY BONY STRUCTURES:  No acute osseous abnormality  SOFT TISSUES OF THE NECK:  Normal  THORACIC INLET:  Unremarkable  Impression: No acute intracranial pathology  No significant stenosis of the cervical carotid or vertebral arteries No significant intracranial stenosis, vessel occlusion or aneurysm  Stable 4 6 cm aneurysm of the ascending thoracic aorta  Workstation performed: [id number]        Final Diagnosis:  1  Weakness    2  Aneurysm of thoracic aorta ([address] )    3  Syncope    4  AMS (altered mental status)    5  Chest pain        Code Status: No Order    Portions of the record may have been created with voice recognition software  Occasional wrong word or "sound a like" substitutions may have occurred due to the inherent limitations of voice recognition software  Read the chart carefully and recognize, using context, where substitutions have occurred      Electronically signed by:  Minerva Sanabria, PGY 2, MD Tere Long MD  01/10/21 5156

## 2021-01-14 ENCOUNTER — OFFICE VISIT (OUTPATIENT)
Dept: FAMILY MEDICINE CLINIC | Facility: CLINIC | Age: 56
End: 2021-01-14
Payer: COMMERCIAL

## 2021-01-14 VITALS
WEIGHT: 200 LBS | HEART RATE: 71 BPM | RESPIRATION RATE: 16 BRPM | TEMPERATURE: 98 F | BODY MASS INDEX: 28.63 KG/M2 | SYSTOLIC BLOOD PRESSURE: 150 MMHG | HEIGHT: 70 IN | OXYGEN SATURATION: 98 % | DIASTOLIC BLOOD PRESSURE: 90 MMHG

## 2021-01-14 DIAGNOSIS — I71.2 THORACIC AORTIC ANEURYSM WITHOUT RUPTURE (HCC): ICD-10-CM

## 2021-01-14 DIAGNOSIS — E11.69 DIABETES MELLITUS TYPE 2 IN OBESE (HCC): Primary | ICD-10-CM

## 2021-01-14 DIAGNOSIS — E66.9 DIABETES MELLITUS TYPE 2 IN OBESE (HCC): Primary | ICD-10-CM

## 2021-01-14 DIAGNOSIS — R42 DIZZINESS: ICD-10-CM

## 2021-01-14 DIAGNOSIS — I10 ESSENTIAL HYPERTENSION: ICD-10-CM

## 2021-01-14 PROBLEM — I71.20 THORACIC AORTIC ANEURYSM WITHOUT RUPTURE: Status: ACTIVE | Noted: 2021-01-14

## 2021-01-14 PROCEDURE — 3008F BODY MASS INDEX DOCD: CPT | Performed by: INTERNAL MEDICINE

## 2021-01-14 PROCEDURE — 3080F DIAST BP >= 90 MM HG: CPT | Performed by: INTERNAL MEDICINE

## 2021-01-14 PROCEDURE — 99214 OFFICE O/P EST MOD 30 MIN: CPT | Performed by: INTERNAL MEDICINE

## 2021-01-14 PROCEDURE — 3077F SYST BP >= 140 MM HG: CPT | Performed by: INTERNAL MEDICINE

## 2021-01-14 RX ORDER — MECLIZINE HCL 12.5 MG/1
12.5 TABLET ORAL EVERY 8 HOURS PRN
Qty: 30 TABLET | Refills: 1 | Status: SHIPPED | OUTPATIENT
Start: 2021-01-14

## 2021-01-14 NOTE — ASSESSMENT & PLAN NOTE
Up due to his not taking lisinopril  Counseling given to  prescription from pharmacy and take it daily

## 2021-01-14 NOTE — ASSESSMENT & PLAN NOTE
Lab Results   Component Value Date    HGBA1C 10 3 (A) 11/20/2020   pt refussed for insulin  He also refused for Trulicity  On jardiance  Not taking metformin and pioglitazone- why? Was ordered  Will call pharmacy and find out  From pharmacy he did not get metformin due to extended release is not covered  I will order regular metformin  He say is he is taking pioglitazone  Counseling given on importance of medications  Risk of diabetes discussed with patient including death

## 2021-01-14 NOTE — PROGRESS NOTES
Assessment/Plan:         Problem List Items Addressed This Visit        Endocrine    Diabetes mellitus type 2 in obese [facility]) - Primary       Lab Results   Component Value Date    HGBA1C 10 3 (A) 11/20/2020   pt refussed for insulin  On jardiance  Not taking metformin and pioglitazone- why? Was ordered  Will call pharmacy and find out  Relevant Medications    metFORMIN (GLUCOPHAGE) 1000 MG tablet    Other Relevant Orders    Ambulatory referral to Endocrinology       Cardiovascular and Mediastinum    Thoracic aortic aneurysm without rupture ([facility] [address] )     4 6 cm since 2016  No change in size as per recent CTA 1/2021  Refer to vascular         Relevant Orders    Ambulatory referral to Vascular Surgery       Other    Dizziness    Relevant Medications    meclizine (ANTIVERT) 12 5 MG tablet            Subjective:      Patient ID: Mima Robert is a 48 y o  male  1  Dizziness  For past few days patient is feeling off balance  He was seen in emergency room  His CT scan of the brain was okay without any acute changes  He still feeling slightly off but better  No chest pain or shortness of breath  He was not given any meclizine  I will order that  No fever or chills  No other neurological symptoms  2  Diabetes type 2  Patient has a big issue with noncompliance  He did not get metformin from the pharmacy  I will change it to regular metformin  No polyuria or polydipsia  No increased numbness or tingling  No increased fatigue  No abdominal pain nausea or vomiting  No change in the vision  3  Hypertension  He is noncompliant with his medications  Blood pressure is 150/90 today  Counseling given to the patient again about and importance of medications          The following portions of the patient's history were reviewed and updated as appropriate:   Past Medical History:  He has a past medical history of Aortic aneurysm ([facility] [address] ), Decreased testosterone level, Diabetes mellitus ([facility] [address] ), GERD (gastroesophageal reflux disease), High cholesterol, Hyperlipidemia, Hypertension, and Reduced libido ,  _______________________________________________________________________  Medical Problems:  does not have any pertinent problems on file ,  _______________________________________________________________________  Past Surgical History:   has a past surgical history that includes Colonoscopy (2015)  ,  _______________________________________________________________________  Family History:  family history includes Hypertension in his father and mother ,  _______________________________________________________________________  Social History:   reports that he has been smoking  He has never used smokeless tobacco  He reports that he does not drink alcohol or use drugs  ,  _______________________________________________________________________  Allergies:  is allergic to penicillins and clindamycin     _______________________________________________________________________  Current Outpatient Medications   Medication Sig Dispense Refill    atorvastatin (LIPITOR) 10 mg tablet Take 1 tablet (10 mg total) by mouth daily 90 tablet 1    Empagliflozin (Jardiance) 25 MG TABS Take 1 tablet (25 mg total) by mouth every morning 30 tablet 5    Glucose Blood (BLOOD GLUCOSE TEST STRIPS) STRP test BS twice daily as directed      pioglitazone (ACTOS) 15 mg tablet Take 1 tablet (15 mg total) by mouth daily 90 tablet 1    sildenafil (VIAGRA) 100 mg tablet 1 po every 3 days prn 10 tablet 1    HYDROcodone-acetaminophen (NORCO) 5-325 mg per tablet Take 1 tablet by mouth every 6 (six) hours as needed for moderate pain (Not relieved by Anti-inflammatory)   Max Daily Amount: 4 tablets (Patient not taking: Reported on 11/20/2020) 20 tablet 0    lisinopril (ZESTRIL) 10 mg tablet Take 1 tablet (10 mg total) by mouth daily (Patient not taking: Reported on 1/14/2021) 90 tablet 1    meclizine (ANTIVERT) 12 5 MG tablet Take 1 tablet (12 5 mg total) by mouth every 8 (eight) hours as needed for dizziness 30 tablet 1    metFORMIN (GLUCOPHAGE) 1000 MG tablet Take 1 tablet (1,000 mg total) by mouth 2 (two) times a day with meals 180 tablet 1    omeprazole (PriLOSEC) 20 mg delayed release capsule TAKE 1 CAPSULE(20 MG) BY MOUTH DAILY       No current facility-administered medications for this visit       _______________________________________________________________________  Review of Systems   Constitutional: Negative for chills, fatigue and fever  HENT: Negative for congestion, nosebleeds and rhinorrhea  Eyes: Negative for discharge and visual disturbance  Respiratory: Negative for cough, chest tightness, shortness of breath and wheezing  Cardiovascular: Negative for chest pain  Gastrointestinal: Negative for abdominal distention, abdominal pain, constipation, diarrhea and vomiting  Endocrine: Negative for polydipsia and polyuria  Genitourinary: Negative for difficulty urinating, frequency and hematuria  Musculoskeletal: Negative for arthralgias, back pain and myalgias  Skin: Negative for rash  Allergic/Immunologic: Negative for environmental allergies  Neurological: Positive for dizziness  Negative for syncope, weakness, light-headedness and headaches  Hematological: Negative  Psychiatric/Behavioral: Negative for agitation, confusion, decreased concentration, dysphoric mood, sleep disturbance and suicidal ideas  The patient is not nervous/anxious  All other systems reviewed and are negative  Objective:  Vitals:    01/14/21 0820   BP: 150/90   BP Location: Right arm   Patient Position: Sitting   Cuff Size: Standard   Pulse: 71   Resp: 16   Temp: 98 °F (36 7 °C)   TempSrc: Temporal   SpO2: 98%   Weight: 90 7 kg (200 lb)   Height: 5' 10" (1 778 m)     Body mass index is 28 7 kg/m²  Physical Exam  Vitals signs and nursing note reviewed  Constitutional:       General: He is not in acute distress       Appearance: Normal appearance  He is well-developed  HENT:      Head: Normocephalic and atraumatic  Eyes:      General:         Right eye: No discharge  Left eye: No discharge  Neck:      Musculoskeletal: Neck supple  Thyroid: No thyromegaly  Cardiovascular:      Rate and Rhythm: Normal rate and regular rhythm  Pulses: Normal pulses  Heart sounds: Normal heart sounds  No murmur  Pulmonary:      Effort: Pulmonary effort is normal       Breath sounds: Normal breath sounds  No wheezing or rhonchi  Abdominal:      General: Bowel sounds are normal  There is no distension  Palpations: Abdomen is soft  Tenderness: There is no abdominal tenderness  Musculoskeletal:         General: No tenderness  Right lower leg: No edema  Left lower leg: No edema  Lymphadenopathy:      Cervical: No cervical adenopathy  Skin:     General: Skin is warm  Findings: No erythema  Neurological:      General: No focal deficit present  Mental Status: He is alert and oriented to person, place, and time  Psychiatric:         Mood and Affect: Mood normal          Behavior: Behavior normal          Thought Content:  Thought content normal

## 2021-01-14 NOTE — LETTER
January 14, 2021     Patient: Radha Alfaro   YOB: 1965   Date of Visit: 1/14/2021       To Whom it May Concern:    Winston Hanks is under my professional care  He was seen in my office on 1/11/21  He is able to return to work from 1/18/21  If you have any questions or concerns, please don't hesitate to call  Sincerely,          Ever Henao MD        CC: Alix Finley

## 2021-01-14 NOTE — ASSESSMENT & PLAN NOTE
Start meclizine as needed  CT of the brain was without any acute changes in emergency room  Patient is not going for work due to feeling dizzy  He thinks he will be okay to return to work from this Monday

## 2021-02-04 DIAGNOSIS — E11.69 ERECTILE DYSFUNCTION DUE TO DIABETES MELLITUS (HCC): ICD-10-CM

## 2021-02-04 DIAGNOSIS — N52.1 ERECTILE DYSFUNCTION DUE TO DIABETES MELLITUS (HCC): ICD-10-CM

## 2021-02-04 RX ORDER — SILDENAFIL 100 MG/1
TABLET, FILM COATED ORAL
Qty: 10 TABLET | Refills: 1 | Status: SHIPPED | OUTPATIENT
Start: 2021-02-04 | End: 2022-03-01 | Stop reason: ALTCHOICE

## 2021-02-11 ENCOUNTER — OFFICE VISIT (OUTPATIENT)
Dept: FAMILY MEDICINE CLINIC | Facility: CLINIC | Age: 56
End: 2021-02-11
Payer: COMMERCIAL

## 2021-02-11 VITALS
TEMPERATURE: 98.6 F | HEIGHT: 71 IN | WEIGHT: 189 LBS | HEART RATE: 74 BPM | OXYGEN SATURATION: 98 % | SYSTOLIC BLOOD PRESSURE: 110 MMHG | DIASTOLIC BLOOD PRESSURE: 70 MMHG | RESPIRATION RATE: 16 BRPM | BODY MASS INDEX: 26.46 KG/M2

## 2021-02-11 DIAGNOSIS — Z12.11 SCREENING FOR COLORECTAL CANCER: ICD-10-CM

## 2021-02-11 DIAGNOSIS — Z00.00 ENCOUNTER FOR PREVENTATIVE ADULT HEALTH CARE EXAMINATION: Primary | ICD-10-CM

## 2021-02-11 DIAGNOSIS — Z12.5 ENCOUNTER FOR PROSTATE CANCER SCREENING: ICD-10-CM

## 2021-02-11 DIAGNOSIS — E11.69 DIABETES MELLITUS TYPE 2 IN OBESE (HCC): ICD-10-CM

## 2021-02-11 DIAGNOSIS — Z00.00 ANNUAL PHYSICAL EXAM: ICD-10-CM

## 2021-02-11 DIAGNOSIS — Z12.12 SCREENING FOR COLORECTAL CANCER: ICD-10-CM

## 2021-02-11 DIAGNOSIS — I10 ESSENTIAL HYPERTENSION: ICD-10-CM

## 2021-02-11 DIAGNOSIS — E66.9 DIABETES MELLITUS TYPE 2 IN OBESE (HCC): ICD-10-CM

## 2021-02-11 PROCEDURE — 3008F BODY MASS INDEX DOCD: CPT | Performed by: INTERNAL MEDICINE

## 2021-02-11 PROCEDURE — 3078F DIAST BP <80 MM HG: CPT | Performed by: INTERNAL MEDICINE

## 2021-02-11 PROCEDURE — 3074F SYST BP LT 130 MM HG: CPT | Performed by: INTERNAL MEDICINE

## 2021-02-11 PROCEDURE — 99396 PREV VISIT EST AGE 40-64: CPT | Performed by: INTERNAL MEDICINE

## 2021-02-11 PROCEDURE — 4004F PT TOBACCO SCREEN RCVD TLK: CPT | Performed by: INTERNAL MEDICINE

## 2021-02-11 RX ORDER — SILDENAFIL 50 MG/1
TABLET, FILM COATED ORAL
COMMUNITY
Start: 2021-02-03 | End: 2021-02-11 | Stop reason: SDUPTHER

## 2021-02-11 NOTE — PROGRESS NOTES
[facility] [place]    NAME: Tyler Nunes  AGE: 48 y o  SEX: male  : 1965     DATE: 2021     Assessment and Plan:     Problem List Items Addressed This Visit        Endocrine    Diabetes mellitus type 2 in obese ([address] )    Relevant Orders    HEMOGLOBIN A1C W/ EAG ESTIMATION       Cardiovascular and Mediastinum    Essential hypertension     Much better taking lisinopril  Relevant Orders    CBC and differential    Comprehensive metabolic panel    Lipid panel       Other    Encounter for preventative adult health care examination - Primary     Order cologuard as pt does not want to go for colonoscopy due to covid  Other Visit Diagnoses     Annual physical exam        Screening for colorectal cancer        Relevant Orders    Cologuard    Encounter for prostate cancer screening        Relevant Orders    PSA, Total Screen          Immunizations and preventive care screenings were discussed with patient today  Appropriate education was printed on patient's after visit summary  Counseling:  · Alcohol/drug use: discussed moderation in alcohol intake, the recommendations for healthy alcohol use, and avoidance of illicit drug use  BMI Counseling: Body mass index is 26 36 kg/m²  The BMI is above normal  Nutrition recommendations include decreasing portion sizes, encouraging healthy choices of fruits and vegetables, limiting drinks that contain sugar, moderation in carbohydrate intake and reducing intake of cholesterol  Exercise recommendations include vigorous physical activity 75 minutes/week, exercising 3-5 times per week and strength training exercises  No pharmacotherapy was ordered  Tobacco Cessation Counseling: Tobacco cessation counseling was provided  The patient is sincerely urged to quit consumption of tobacco  He is not ready to quit tobacco  Medication options not discussed   Only sometime he smoke mer      Return in about 3 months (around 5/11/2021)  Chief Complaint:     Chief Complaint   Patient presents with    Annual Exam      History of Present Illness:     Adult Annual Physical   Patient here for a comprehensive physical exam  The patient reports no problems  Diet and Physical Activity  · Diet/Nutrition: well balanced diet  · Exercise: no formal exercise  Depression Screening  PHQ-9 Depression Screening    PHQ-9:   Frequency of the following problems over the past two weeks:           General Health  · Sleep: sleeps well  · Hearing: normal - bilateral   · Vision: no vision problems  · Dental: regular dental visits   Health  · Symptoms include: none     Review of Systems:     Review of Systems   Constitutional: Negative for chills, fatigue and fever  HENT: Negative for congestion, nosebleeds and rhinorrhea  Eyes: Negative for discharge and visual disturbance  Respiratory: Negative for cough, chest tightness, shortness of breath and wheezing  Cardiovascular: Negative for chest pain, palpitations and leg swelling  Gastrointestinal: Negative for abdominal distention, abdominal pain, constipation, diarrhea and vomiting  Endocrine: Negative for polydipsia and polyuria  Genitourinary: Negative for difficulty urinating, frequency and hematuria  Musculoskeletal: Negative for arthralgias, back pain and myalgias  Skin: Negative for rash  Allergic/Immunologic: Negative for environmental allergies  Neurological: Negative for dizziness, syncope, weakness, light-headedness and headaches  Hematological: Negative  Psychiatric/Behavioral: Negative for agitation, confusion, decreased concentration, dysphoric mood, sleep disturbance and suicidal ideas  The patient is not nervous/anxious  All other systems reviewed and are negative       Past Medical History:     Past Medical History:   Diagnosis Date    Aortic aneurysm (HCC)     Decreased testosterone level     Diabetes mellitus ([address] )     GERD (gastroesophageal reflux disease)     High cholesterol     Hyperlipidemia     Hypertension     Reduced libido       Past Surgical History:     Past Surgical History:   Procedure Laterality Date    COLONOSCOPY        Family History:     Family History   Problem Relation Age of Onset    Hypertension Mother     Hypertension Father       Social History:     E-Cigarette/Vaping    E-Cigarette Use Never User      E-Cigarette/Vaping Substances    Nicotine No     THC No     CBD No     Flavoring No      Social History     Socioeconomic History    Marital status: /Civil Union     Spouse name: None    Number of children: None    Years of education: None    Highest education level: None   Occupational History    None   Social Needs    Financial resource strain: Not hard at all   [place] insecurity     Worry: Never true     Inability: Never true    Transportation needs     Medical: No     Non-medical: No   Tobacco Use    Smoking status: Current Some Day Smoker    Smokeless tobacco: Never Used    Tobacco comment: hookah, today    Substance and Sexual Activity    Alcohol use: No    Drug use: No    Sexual activity: None   Lifestyle    Physical activity     Days per week: 0 days     Minutes per session: 0 min    Stress: Not at all   Relationships    Social connections     Talks on phone:  Three times a week     Gets together: Twice a week     Attends [employer] service: Never     Active member of club or organization: No     Attends meetings of clubs or organizations: Never     Relationship status:     Intimate partner violence     Fear of current or ex partner: No     Emotionally abused: No     Physically abused: No     Forced sexual activity: No   Other Topics Concern    None   Social History Narrative    · Most recent tobacco use screenin2019     · Advance directive:   Yes  10/4/2019 IRVIN    Per [place]      Current Medications:     Current Outpatient Medications   Medication Sig Dispense Refill    Empagliflozin (Jardiance) 25 MG TABS Take 1 tablet (25 mg total) by mouth every morning 30 tablet 5    Glucose Blood (BLOOD GLUCOSE TEST STRIPS) STRP test BS twice daily as directed      lisinopril (ZESTRIL) 10 mg tablet Take 1 tablet (10 mg total) by mouth daily 90 tablet 1    meclizine (ANTIVERT) 12 5 MG tablet Take 1 tablet (12 5 mg total) by mouth every 8 (eight) hours as needed for dizziness 30 tablet 1    metFORMIN (GLUCOPHAGE) 1000 MG tablet Take 1 tablet (1,000 mg total) by mouth 2 (two) times a day with meals 180 tablet 1    omeprazole (PriLOSEC) 20 mg delayed release capsule TAKE 1 CAPSULE(20 MG) BY MOUTH DAILY      pioglitazone (ACTOS) 15 mg tablet Take 1 tablet (15 mg total) by mouth daily 90 tablet 1    sildenafil (VIAGRA) 100 mg tablet TAKE 1 TABLET BY MOUTH EVERY 3 DAYS AS NEEDED 10 tablet 1    atorvastatin (LIPITOR) 10 mg tablet Take 1 tablet (10 mg total) by mouth daily (Patient not taking: Reported on 2/11/2021) 90 tablet 1    HYDROcodone-acetaminophen (NORCO) 5-325 mg per tablet Take 1 tablet by mouth every 6 (six) hours as needed for moderate pain (Not relieved by Anti-inflammatory)  Max Daily Amount: 4 tablets (Patient not taking: Reported on 11/20/2020) 20 tablet 0     No current facility-administered medications for this visit  Allergies: Allergies   Allergen Reactions    Penicillins Swelling    Clindamycin Hives      Physical Exam:     /70 (BP Location: Left arm, Patient Position: Sitting, Cuff Size: Large)   Pulse 74   Temp 98 6 °F (37 °C) (Temporal)   Resp 16   Ht 5' 11" (1 803 m)   Wt 85 7 kg (189 lb)   SpO2 98%   BMI 26 36 kg/m²     Physical Exam  Vitals signs and nursing note reviewed  Constitutional:       Appearance: He is well-developed  HENT:      Head: Normocephalic and atraumatic  Eyes:      Conjunctiva/sclera: Conjunctivae normal    Neck:      Musculoskeletal: Neck supple  Cardiovascular:      Rate and Rhythm: Normal rate and regular rhythm  Pulses: no weak pulses          Dorsalis pedis pulses are 1+ on the right side and 1+ on the left side  Heart sounds: Normal heart sounds  No murmur  Pulmonary:      Effort: Pulmonary effort is normal  No respiratory distress  Breath sounds: Normal breath sounds  Abdominal:      General: Bowel sounds are normal  There is no distension  Palpations: Abdomen is soft  Tenderness: There is no abdominal tenderness  Musculoskeletal:      Right lower leg: No edema  Left lower leg: No edema  Feet:      Right foot:      Skin integrity: No ulcer, skin breakdown, erythema, warmth, callus or dry skin  Left foot:      Skin integrity: No ulcer, skin breakdown, erythema, warmth, callus or dry skin  Skin:     General: Skin is warm and dry  Neurological:      Mental Status: He is alert and oriented to person, place, and time  Psychiatric:         Mood and Affect: Mood normal          Behavior: Behavior normal       Patient's shoes and socks removed  Right Foot/Ankle   Right Foot Inspection  Skin Exam: skin normal skin not intact, no dry skin, no warmth, no callus, no erythema, no maceration, no abnormal color, no pre-ulcer, no ulcer and no callus                          Toe Exam: ROM and strength within normal limits  Sensory       Monofilament testing: intact  Vascular  Capillary refills: < 3 seconds  The right DP pulse is 1+  Left Foot/Ankle  Left Foot Inspection  Skin Exam: skin normalskin not intact, no dry skin, no warmth, no erythema, no maceration, normal color, no pre-ulcer, no ulcer and no callus                         Toe Exam: ROM and strength within normal limits                   Sensory       Monofilament: intact  Vascular  Capillary refills: < 3 seconds  The left DP pulse is 1+  Assign Risk Category:  No deformity present; No loss of protective sensation;  No weak pulses       Risk: 0 Benito Lora MD  [facility]

## 2021-02-11 NOTE — PATIENT INSTRUCTIONS
Wellness Visit for Adults   AMBULATORY CARE:   A wellness visit  is when you see your healthcare provider to get screened for health problems  Your healthcare provider will also give you advice on how to stay healthy  Write down your questions so you remember to ask them  Ask your healthcare provider how often you should have a wellness visit  What happens at a wellness visit:  Your healthcare provider will ask about your health, and your family history of health problems  This includes high blood pressure, heart disease, and cancer  He or she will ask if you have symptoms that concern you, if you smoke, and about your mood  You may also be asked about your intake of medicines, supplements, food, and alcohol  Any of the following may be done:  · Your weight  will be checked  Your height may also be checked so your body mass index (BMI) can be calculated  Your BMI shows if you are at a healthy weight  · Your blood pressure  and heart rate will be checked  Your temperature may also be checked  · Blood and urine tests  may be done  Blood tests may be done to check your cholesterol levels  Abnormal cholesterol levels increase your risk for heart disease and stroke  You may also need a blood or urine test to check for diabetes if you are at increased risk  Urine tests may be done to look for signs of an infection or kidney disease  · A physical exam  includes checking your heartbeat and lungs with a stethoscope  Your healthcare provider may also check your skin to look for sun damage  · Screening tests  may be recommended  A screening test is done to check for diseases that may not cause symptoms  The screening tests you may need depend on your age, gender, family history, and lifestyle habits  For example, colorectal screening may be recommended if you are 48years old or older  Screening tests you need if you are a woman:   · A Pap smear  is used to screen for cervical cancer   Pap smears are usually done every 3 to 5 years depending on your age  You may need them more often if you have had abnormal Pap smear test results in the past  Ask your healthcare provider how often you should have a Pap smear  · A mammogram  is an x-ray of your breasts to screen for breast cancer  Experts recommend mammograms every 2 years starting at age 48 years  You may need a mammogram at age 52 years or younger if you have an increased risk for breast cancer  Talk to your healthcare provider about when you should start having mammograms and how often you need them  Vaccines you may need:   · Get an influenza vaccine  every year  The influenza vaccine protects you from the flu  Several types of viruses cause the flu  The viruses change over time, so new vaccines are made each year  · Get a tetanus-diphtheria (Td) booster vaccine  every 10 years  This vaccine protects you against tetanus and diphtheria  Tetanus is a severe infection that may cause painful muscle spasms and lockjaw  Diphtheria is a severe bacterial infection that causes a thick covering in the back of your mouth and throat  · Get a human papillomavirus (HPV) vaccine  if you are female and aged 23 to 32 or male 23 to 24 and never received it  This vaccine protects you from HPV infection  HPV is the most common infection spread by sexual contact  HPV may also cause vaginal, penile, and anal cancers  · Get a pneumococcal vaccine  if you are aged 72 years or older  The pneumococcal vaccine is an injection given to protect you from pneumococcal disease  Pneumococcal disease is an infection caused by pneumococcal bacteria  The infection may cause pneumonia, meningitis, or an ear infection  · Get a shingles vaccine  if you are 60 or older, even if you have had shingles before  The shingles vaccine is an injection to protect you from the varicella-zoster virus  This is the same virus that causes chickenpox   Shingles is a painful rash that develops in people who had chickenpox or have been exposed to the virus  How to eat healthy:  My Plate is a model for planning healthy meals  It shows the types and amounts of foods that should go on your plate  Fruits and vegetables make up about half of your plate, and grains and protein make up the other half  A serving of dairy is included on the side of your plate  The amount of calories and serving sizes you need depends on your age, gender, weight, and height  Examples of healthy foods are listed below:  · Eat a variety of vegetables  such as dark green, red, and orange vegetables  You can also include canned vegetables low in sodium (salt) and frozen vegetables without added butter or sauces  · Eat a variety of fresh fruits , canned fruit in 100% juice, frozen fruit, and dried fruit  · Include whole grains  At least half of the grains you eat should be whole grains  Examples include whole-wheat bread, wheat pasta, brown rice, and whole-grain cereals such as oatmeal     · Eat a variety of protein foods such as seafood (fish and shellfish), lean meat, and poultry without skin (turkey and chicken)  Examples of lean meats include pork leg, shoulder, or tenderloin, and beef round, sirloin, tenderloin, and extra lean ground beef  Other protein foods include eggs and egg substitutes, beans, peas, soy products, nuts, and seeds  · Choose low-fat dairy products such as skim or 1% milk or low-fat yogurt, cheese, and cottage cheese  · Limit unhealthy fats  such as butter, hard margarine, and shortening  Exercise:  Exercise at least 30 minutes per day on most days of the week  Some examples of exercise include walking, biking, dancing, and swimming  You can also fit in more physical activity by taking the stairs instead of the elevator or parking farther away from stores  Include muscle strengthening activities 2 days each week  Regular exercise provides many health benefits   It helps you manage your weight, and decreases your risk for type 2 diabetes, heart disease, stroke, and high blood pressure  Exercise can also help improve your mood  Ask your healthcare provider about the best exercise plan for you  General health and safety guidelines:   · Do not smoke  Nicotine and other chemicals in cigarettes and cigars can cause lung damage  Ask your healthcare provider for information if you currently smoke and need help to quit  E-cigarettes or smokeless tobacco still contain nicotine  Talk to your healthcare provider before you use these products  · Limit alcohol  A drink of alcohol is 12 ounces of beer, 5 ounces of wine, or 1½ ounces of liquor  · Lose weight, if needed  Being overweight increases your risk of certain health conditions  These include heart disease, high blood pressure, type 2 diabetes, and certain types of cancer  · Protect your skin  Do not sunbathe or use tanning beds  Use sunscreen with a SPF 15 or higher  Apply sunscreen at least 15 minutes before you go outside  Reapply sunscreen every 2 hours  Wear protective clothing, hats, and sunglasses when you are outside  · Drive safely  Always wear your seatbelt  Make sure everyone in your car wears a seatbelt  A seatbelt can save your life if you are in an accident  Do not use your cell phone when you are driving  This could distract you and cause an accident  Pull over if you need to make a call or send a text message  · Practice safe sex  Use latex condoms if are sexually active and have more than one partner  Your healthcare provider may recommend screening tests for sexually transmitted infections (STIs)  · Wear helmets, lifejackets, and protective gear  Always wear a helmet when you ride a bike or motorcycle, go skiing, or play sports that could cause a head injury  Wear protective equipment when you play sports  Wear a lifejacket when you are on a boat or doing water sports      © Copyright [employer] 2020 Information is for End User's use only and may not be sold, redistributed or otherwise used for commercial purposes  All illustrations and images included in CareNotes® are the copyrighted property of A D A M , Inc  or [employer]  The above information is an  only  It is not intended as medical advice for individual conditions or treatments  Talk to your doctor, nurse or pharmacist before following any medical regimen to see if it is safe and effective for you  Cholesterol and Your Health   AMBULATORY CARE:   Cholesterol  is a waxy, fat-like substance  Your body uses cholesterol to make hormones and new cells, and to protect nerves  Cholesterol is made by your body  It also comes from certain foods you eat, such as meat and dairy products  Your healthcare provider can help you set goals for your cholesterol levels  He or she can help you create a plan to meet your goals  Cholesterol level goals: Your cholesterol level goals depend on your risk for heart disease, your age, and your other health conditions  The following are general guidelines:  · Total cholesterol  includes low-density lipoprotein (LDL), high-density lipoprotein (HDL), and triglyceride levels  The total cholesterol level should be lower than 200 mg/dL and is best at about 150 mg/dL  · LDL cholesterol  is called bad cholesterol  because it forms plaque in your arteries  As plaque builds up, your arteries become narrow, and less blood flows through  When plaque decreases blood flow to your heart, you may have chest pain  If plaque completely blocks an artery that brings blood to your heart, you may have a heart attack  Plaque can break off and form blood clots  Blood clots may block arteries in your brain and cause a stroke  The level should be less than 130 mg/dL and is best at about 100 mg/dL  · HDL cholesterol  is called good cholesterol  because it helps remove LDL cholesterol from your arteries   It does this by attaching to LDL cholesterol and carrying it to your liver  Your liver breaks down LDL cholesterol so your body can get rid of it  High levels of HDL cholesterol can help prevent a heart attack and stroke  Low levels of HDL cholesterol can increase your risk for heart disease, heart attack, and stroke  The level should be 60 mg/dL or higher  · Triglycerides  are a type of fat that store energy from foods you eat  High levels of triglycerides also cause plaque buildup  This can increase your risk for a heart attack or stroke  If your triglyceride level is high, your LDL cholesterol level may also be high  The level should be less than 150 mg/dL  What increases your risk for high cholesterol:   · Smoking cigarettes    · Being overweight or obese, or not getting enough exercise    · Drinking large amounts of alcohol    · A medical condition such as hypertension (high blood pressure) or diabetes    · Certain genes passed from your parents to you    · Age older than 65 years    What you need to know about having your cholesterol levels checked: Adults 21to 39years of age should have their cholesterol levels checked every 4 to 6 years  Adults 45 years or older should have their cholesterol checked every 1 to 2 years  You may need your cholesterol checked more often, or at a younger age, if you have risk factors for heart disease  You may also need to have your cholesterol checked more often if you have other health conditions, such as diabetes  Blood tests are used to check cholesterol levels  Blood tests measure your levels of triglycerides, LDL cholesterol, and HDL cholesterol  How healthy fats affect your cholesterol levels:  Healthy fats, also called unsaturated fats, help lower LDL cholesterol and triglyceride levels  Healthy fats include the following:  · Monounsaturated fats  are found in foods such as olive oil, canola oil, avocado, nuts, and olives      · Polyunsaturated fats,  such as omega 3 fats, are found in fish, such as salmon, trout, and tuna  They can also be found in plant foods such as flaxseed, walnuts, and soybeans  How unhealthy fats affect your cholesterol levels:  Unhealthy fats increase LDL cholesterol and triglyceride levels  They are found in foods high in cholesterol, saturated fat, and trans fat:  · Cholesterol  is found in eggs, dairy, and meat  · Saturated fat  is found in butter, cheese, ice cream, whole milk, and coconut oil  Saturated fat is also found in meat, such as sausage, hot dogs, and bologna  · Trans fat  is found in liquid oils and is used in fried and baked foods  Foods that contain trans fats include chips, crackers, muffins, sweet rolls, microwave popcorn, and cookies  Treatment  for high cholesterol will also decrease your risk of heart disease, heart attack, and stroke  Treatment may include any of the following:  · Lifestyle changes  may include food, exercise, weight loss, and quitting smoking  You may also need to decrease the amount of alcohol you drink  Your healthcare provider will want you to start with lifestyle changes  Other treatment may be added if lifestyle changes are not enough  · Medicines  may be given to lower your LDL cholesterol, triglyceride levels, or total cholesterol level  You may need medicines to lower your cholesterol if any of the following is true:     ? You have a history of stroke, TIA, unstable angina, or a heart attack  ? Your LDL cholesterol level is 190 mg/dL or higher  ? You are age 36 to 76 years, have diabetes or heart disease risk factors, and your LDL cholesterol is 70 mg/dL or higher  · Supplements  include fish oil, red yeast rice, and garlic  Fish oil may help lower your triglyceride and LDL cholesterol levels  It may also increase your HDL cholesterol level  Red yeast rice may help decrease your total cholesterol level and LDL cholesterol level  Garlic may help lower your total cholesterol level   Do not take these supplements without talking to your healthcare provider  Food changes you can make to lower your cholesterol levels:  A dietitian can help you create a healthy eating plan  He or she can show you how to read food labels and choose foods low in saturated fat, trans fats, and cholesterol  · Decrease the total amount of fat you eat  Choose lean meats, fat-free or 1% fat milk, and low-fat dairy products, such as yogurt and cheese  Try to limit or avoid red meats  Limit or do not eat fried foods or baked goods, such as cookies  · Replace unhealthy fats with healthy fats  Cook foods in olive oil or canola oil  Choose soft margarines that are low in saturated fat and trans fat  Seeds, nuts, and avocados are other examples of healthy fats  · Eat foods with omega-3 fats  Examples include salmon, tuna, mackerel, walnuts, and flaxseed  Eat fish 2 times per week  Pregnant women should not eat fish that have high levels of mercury, such as shark, swordfish, and ainsley mackerel  · Increase the amount of high-fiber foods you eat  High-fiber foods can help lower your LDL cholesterol  Aim to get between 20 and 30 grams of fiber each day  Fruits and vegetables are high in fiber  Eat at least 5 servings each day  Other high-fiber foods are whole-grain or whole-wheat breads, pastas, or cereals, and brown rice  Eat 3 ounces of whole-grain foods each day  Increase fiber slowly  You may have abdominal discomfort, bloating, and gas if you add fiber to your diet too quickly  · Eat healthy protein foods  Examples include low-fat dairy products, skinless chicken and turkey, fish, and nuts  · Limit foods and drinks that are high in sugar  Your dietitian or healthcare provider can help you create daily limits for high-sugar foods and drinks  The limit may be lower if you have diabetes or another health condition  Limits can also help you lose weight if needed    Lifestyle changes you can make to lower your cholesterol levels:   · Maintain a healthy weight  Ask your healthcare provider what a healthy weight is for you  Ask him or her to help you create a weight loss plan if needed  Weight loss can decrease your total cholesterol and triglyceride levels  Weight loss may also help keep your blood pressure at a healthy level  · Exercise regularly  Exercise can help lower your total cholesterol level and maintain a healthy weight  Exercise can also help increase your HDL cholesterol level  Work with your healthcare provider to create an exercise program that is right for you  Get at least 30 to 40 minutes of moderate exercise most days of the week  Examples of exercise include brisk walking, swimming, or biking  · Do not smoke  Nicotine and other chemicals in cigarettes and cigars can raise your cholesterol levels  Ask your healthcare provider for information if you currently smoke and need help to quit  E-cigarettes or smokeless tobacco still contain nicotine  Talk to your healthcare provider before you use these products  · Limit or do not drink alcohol  Alcohol can increase your triglyceride levels  Ask your healthcare provider before you drink alcohol  Ask how much is okay for you to drink in 1 day or 1 week  © Copyright [address] Information is for End User's use only and may not be sold, redistributed or otherwise used for commercial purposes  All illustrations and images included in CareNotes® are the copyrighted property of A D A M , Inc  or [address] [place], [place] [address]anjum   The above information is an  only  It is not intended as medical advice for individual conditions or treatments  Talk to your doctor, nurse or pharmacist before following any medical regimen to see if it is safe and effective for you

## 2021-04-21 ENCOUNTER — TRANSCRIBE ORDERS (OUTPATIENT)
Dept: RADIOLOGY | Facility: HOSPITAL | Age: 56
End: 2021-04-21

## 2021-04-21 ENCOUNTER — APPOINTMENT (OUTPATIENT)
Dept: LAB | Facility: CLINIC | Age: 56
End: 2021-04-21
Payer: COMMERCIAL

## 2021-04-21 DIAGNOSIS — Z12.5 ENCOUNTER FOR PROSTATE CANCER SCREENING: ICD-10-CM

## 2021-04-21 DIAGNOSIS — I10 ESSENTIAL HYPERTENSION: ICD-10-CM

## 2021-04-21 DIAGNOSIS — E66.9 DIABETES MELLITUS TYPE 2 IN OBESE (HCC): ICD-10-CM

## 2021-04-21 DIAGNOSIS — E11.69 DIABETES MELLITUS TYPE 2 IN OBESE (HCC): ICD-10-CM

## 2021-04-21 LAB
ALBUMIN SERPL BCP-MCNC: 3.8 G/DL (ref 3.5–5)
ALP SERPL-CCNC: 81 U/L (ref 46–116)
ALT SERPL W P-5'-P-CCNC: 18 U/L (ref 12–78)
ANION GAP SERPL CALCULATED.3IONS-SCNC: 10 MMOL/L (ref 4–13)
AST SERPL W P-5'-P-CCNC: 11 U/L (ref 5–45)
BASOPHILS # BLD AUTO: 0.06 THOUSANDS/ΜL (ref 0–0.1)
BASOPHILS NFR BLD AUTO: 1 % (ref 0–1)
BILIRUB SERPL-MCNC: 0.78 MG/DL (ref 0.2–1)
BUN SERPL-MCNC: 19 MG/DL (ref 5–25)
CALCIUM SERPL-MCNC: 9.2 MG/DL (ref 8.3–10.1)
CHLORIDE SERPL-SCNC: 98 MMOL/L (ref 100–108)
CHOLEST SERPL-MCNC: 244 MG/DL (ref 50–200)
CO2 SERPL-SCNC: 26 MMOL/L (ref 21–32)
CREAT SERPL-MCNC: 0.8 MG/DL (ref 0.6–1.3)
EOSINOPHIL # BLD AUTO: 0.06 THOUSAND/ΜL (ref 0–0.61)
EOSINOPHIL NFR BLD AUTO: 1 % (ref 0–6)
ERYTHROCYTE [DISTWIDTH] IN BLOOD BY AUTOMATED COUNT: 14.2 % (ref 11.6–15.1)
GFR SERPL CREATININE-BSD FRML MDRD: 101 ML/MIN/1.73SQ M
GLUCOSE P FAST SERPL-MCNC: 152 MG/DL (ref 65–99)
HCT VFR BLD AUTO: 51.6 % (ref 36.5–49.3)
HDLC SERPL-MCNC: 44 MG/DL
HGB BLD-MCNC: 16.4 G/DL (ref 12–17)
IMM GRANULOCYTES # BLD AUTO: 0.08 THOUSAND/UL (ref 0–0.2)
IMM GRANULOCYTES NFR BLD AUTO: 1 % (ref 0–2)
LDLC SERPL CALC-MCNC: 170 MG/DL (ref 0–100)
LYMPHOCYTES # BLD AUTO: 2.22 THOUSANDS/ΜL (ref 0.6–4.47)
LYMPHOCYTES NFR BLD AUTO: 34 % (ref 14–44)
MCH RBC QN AUTO: 25.8 PG (ref 26.8–34.3)
MCHC RBC AUTO-ENTMCNC: 31.8 G/DL (ref 31.4–37.4)
MCV RBC AUTO: 81 FL (ref 82–98)
MONOCYTES # BLD AUTO: 0.44 THOUSAND/ΜL (ref 0.17–1.22)
MONOCYTES NFR BLD AUTO: 7 % (ref 4–12)
NEUTROPHILS # BLD AUTO: 3.64 THOUSANDS/ΜL (ref 1.85–7.62)
NEUTS SEG NFR BLD AUTO: 56 % (ref 43–75)
NONHDLC SERPL-MCNC: 200 MG/DL
NRBC BLD AUTO-RTO: 0 /100 WBCS
PLATELET # BLD AUTO: 297 THOUSANDS/UL (ref 149–390)
PMV BLD AUTO: 10.9 FL (ref 8.9–12.7)
POTASSIUM SERPL-SCNC: 4 MMOL/L (ref 3.5–5.3)
PROT SERPL-MCNC: 8.3 G/DL (ref 6.4–8.2)
PSA SERPL-MCNC: 0.7 NG/ML (ref 0–4)
RBC # BLD AUTO: 6.35 MILLION/UL (ref 3.88–5.62)
SODIUM SERPL-SCNC: 134 MMOL/L (ref 136–145)
TRIGL SERPL-MCNC: 151 MG/DL
WBC # BLD AUTO: 6.5 THOUSAND/UL (ref 4.31–10.16)

## 2021-04-21 PROCEDURE — 80053 COMPREHEN METABOLIC PANEL: CPT

## 2021-04-21 PROCEDURE — 83036 HEMOGLOBIN GLYCOSYLATED A1C: CPT

## 2021-04-21 PROCEDURE — 36415 COLL VENOUS BLD VENIPUNCTURE: CPT

## 2021-04-21 PROCEDURE — 80061 LIPID PANEL: CPT

## 2021-04-21 PROCEDURE — 85025 COMPLETE CBC W/AUTO DIFF WBC: CPT

## 2021-04-21 PROCEDURE — G0103 PSA SCREENING: HCPCS

## 2021-04-22 ENCOUNTER — TELEPHONE (OUTPATIENT)
Dept: FAMILY MEDICINE CLINIC | Facility: CLINIC | Age: 56
End: 2021-04-22

## 2021-04-22 LAB
EST. AVERAGE GLUCOSE BLD GHB EST-MCNC: 217 MG/DL
HBA1C MFR BLD: 9.2 %

## 2021-04-22 NOTE — TELEPHONE ENCOUNTER
----- Message from Lázaro Ortiz MD sent at 4/22/2021  8:40 AM EDT -----  Please call the patient regarding his abnormal result  Sugar better but still up  Cholesterol very high- is he taking atorvastatin daily? ???

## 2021-05-06 ENCOUNTER — RA CDI HCC (OUTPATIENT)
Dept: OTHER | Facility: HOSPITAL | Age: 56
End: 2021-05-06

## 2021-05-11 RX ORDER — CELECOXIB 200 MG/1
200 CAPSULE ORAL 2 TIMES DAILY
COMMUNITY
Start: 2021-04-21 | End: 2021-08-30 | Stop reason: CLARIF

## 2021-05-12 ENCOUNTER — OFFICE VISIT (OUTPATIENT)
Dept: FAMILY MEDICINE CLINIC | Facility: CLINIC | Age: 56
End: 2021-05-12
Payer: COMMERCIAL

## 2021-05-12 VITALS
OXYGEN SATURATION: 98 % | HEART RATE: 65 BPM | SYSTOLIC BLOOD PRESSURE: 120 MMHG | TEMPERATURE: 97.1 F | RESPIRATION RATE: 16 BRPM | DIASTOLIC BLOOD PRESSURE: 70 MMHG | BODY MASS INDEX: 27.83 KG/M2 | WEIGHT: 198.8 LBS | HEIGHT: 71 IN

## 2021-05-12 DIAGNOSIS — E66.9 DIABETES MELLITUS TYPE 2 IN OBESE (HCC): Primary | ICD-10-CM

## 2021-05-12 DIAGNOSIS — E11.69 ERECTILE DYSFUNCTION DUE TO DIABETES MELLITUS (HCC): ICD-10-CM

## 2021-05-12 DIAGNOSIS — E11.69 DIABETES MELLITUS TYPE 2 IN OBESE (HCC): Primary | ICD-10-CM

## 2021-05-12 DIAGNOSIS — K21.9 GASTROESOPHAGEAL REFLUX DISEASE WITHOUT ESOPHAGITIS: ICD-10-CM

## 2021-05-12 DIAGNOSIS — I10 ESSENTIAL HYPERTENSION: ICD-10-CM

## 2021-05-12 DIAGNOSIS — N52.1 ERECTILE DYSFUNCTION DUE TO DIABETES MELLITUS (HCC): ICD-10-CM

## 2021-05-12 DIAGNOSIS — E78.5 DYSLIPIDEMIA: ICD-10-CM

## 2021-05-12 PROCEDURE — 99214 OFFICE O/P EST MOD 30 MIN: CPT | Performed by: INTERNAL MEDICINE

## 2021-05-12 PROCEDURE — 4004F PT TOBACCO SCREEN RCVD TLK: CPT | Performed by: INTERNAL MEDICINE

## 2021-05-12 RX ORDER — OMEPRAZOLE 20 MG/1
20 CAPSULE, DELAYED RELEASE ORAL DAILY
Qty: 90 CAPSULE | Refills: 2 | Status: SHIPPED | OUTPATIENT
Start: 2021-05-12 | End: 2022-06-01

## 2021-05-12 RX ORDER — PIOGLITAZONEHYDROCHLORIDE 15 MG/1
15 TABLET ORAL DAILY
Qty: 90 TABLET | Refills: 1 | Status: SHIPPED | OUTPATIENT
Start: 2021-05-12

## 2021-05-12 NOTE — ASSESSMENT & PLAN NOTE
Lab Results   Component Value Date    HGBA1C 9 2 (H) 04/21/2021   better but still up  Not taking metofmrin due to diarrhea  On jardiance  Advised to take pioglitazone   Pt refussed for any insulin or trulicity

## 2021-05-12 NOTE — PROGRESS NOTES
Assessment/Plan:         Problem List Items Addressed This Visit        Digestive    Gastroesophageal reflux disease without esophagitis    Relevant Medications    omeprazole (PriLOSEC) 20 mg delayed release capsule       Endocrine    Diabetes mellitus type 2 in obese [facility]) - Primary       Lab Results   Component Value Date    HGBA1C 9 2 (H) 04/21/2021   better but still up  Not taking metofmrin due to diarrhea  On jardiance  Advised to take pioglitazone  Pt refussed for any insulin or trulicity         Relevant Medications    pioglitazone (ACTOS) 15 mg tablet    Other Relevant Orders    HEMOGLOBIN A1C W/ EAG ESTIMATION    Basic metabolic panel    Erectile dysfunction due to diabetes mellitus ([address] )       Lab Results   Component Value Date    HGBA1C 9 2 (H) 04/21/2021   viagra not helping  Refer to urologist         Relevant Medications    pioglitazone (ACTOS) 15 mg tablet    Other Relevant Orders    Ambulatory referral to Urology       Cardiovascular and Mediastinum    Essential hypertension     controlled            Other    Dyslipidemia     ldl 170  Pt not taking lipitor  counselling given on imp of meds  Subjective:      Patient ID: Wilman Cunningham is a 48 y o  male  1  Dm-2-  A1c came down to 9 2  He is not taking metformin due to it gave him diarrhea  He is also not taking pioglitazone I am not sure why  He is only taking Jardiance at present  He refused for insulin or Trulicity  He is trying to watch diet and lifestyle modification  No polyuria or polydipsia  No increased neuropathy  No chest pain or dyspnea or lightheadedness  2   Hyperlipidemia  His LDL was 170  He was started on atorvastatin 10 mg  However he did not start taking it yet  Counseling given on importance of this medication and long-term benefit versus risk  No claudication pain  No angina  3   Hypertension  Blood pressure under control with lisinopril  No headache dizziness or lightheadedness    No edema       The following portions of the patient's history were reviewed and updated as appropriate:   Past Medical History:  He has a past medical history of Aortic aneurysm ([facility] [address] ), Decreased testosterone level, Diabetes mellitus ([address] ), GERD (gastroesophageal reflux disease), High cholesterol, Hyperlipidemia, Hypertension, and Reduced libido ,  _______________________________________________________________________  Medical Problems:  does not have any pertinent problems on file ,  _______________________________________________________________________  Past Surgical History:   has a past surgical history that includes Colonoscopy (2015)  ,  _______________________________________________________________________  Family History:  family history includes Hypertension in his father and mother ,  _______________________________________________________________________  Social History:   reports that he has been smoking  He has never used smokeless tobacco  He reports that he does not drink alcohol or use drugs  ,  _______________________________________________________________________  Allergies:  is allergic to penicillins; metformin; and clindamycin     _______________________________________________________________________  Current Outpatient Medications   Medication Sig Dispense Refill    Empagliflozin (Jardiance) 25 MG TABS Take 1 tablet (25 mg total) by mouth every morning 30 tablet 5    Glucose Blood (BLOOD GLUCOSE TEST STRIPS) STRP test BS twice daily as directed      lisinopril (ZESTRIL) 10 mg tablet Take 1 tablet (10 mg total) by mouth daily 90 tablet 1    omeprazole (PriLOSEC) 20 mg delayed release capsule Take 1 capsule (20 mg total) by mouth daily 90 capsule 2    atorvastatin (LIPITOR) 10 mg tablet Take 1 tablet (10 mg total) by mouth daily (Patient not taking: Reported on 2/11/2021) 90 tablet 1    celecoxib (CeleBREX) 200 mg capsule Take 200 mg by mouth 2 (two) times a day      meclizine (ANTIVERT) 12 5 MG tablet Take 1 tablet (12 5 mg total) by mouth every 8 (eight) hours as needed for dizziness 30 tablet 1    pioglitazone (ACTOS) 15 mg tablet Take 1 tablet (15 mg total) by mouth daily 90 tablet 1    sildenafil (VIAGRA) 100 mg tablet TAKE 1 TABLET BY MOUTH EVERY 3 DAYS AS NEEDED 10 tablet 1    traMADol-acetaminophen (ULTRACET) 37 5-325 mg per tablet TAKE 2 TABLETS BY MOUTH EVERY 4 TO 6 HOURS AS NEEDED  DO NOT EXCEED 8 TABLETS IN 24 HOURS       No current facility-administered medications for this visit       _______________________________________________________________________  Review of Systems      Objective:  Vitals:    05/12/21 0905   BP: 120/70   BP Location: Left arm   Patient Position: Sitting   Cuff Size: Standard   Pulse: 65   Resp: 16   Temp: (!) 97 1 °F (36 2 °C)   TempSrc: Temporal   SpO2: 98%   Weight: 90 2 kg (198 lb 12 8 oz)   Height: 5' 11" (1 803 m)     Body mass index is 27 73 kg/m²       Physical Exam

## 2021-05-12 NOTE — ASSESSMENT & PLAN NOTE
Lab Results   Component Value Date    HGBA1C 9 2 (H) 04/21/2021   viagra not helping   Refer to urologist

## 2021-06-01 ENCOUNTER — TELEPHONE (OUTPATIENT)
Dept: FAMILY MEDICINE CLINIC | Facility: CLINIC | Age: 56
End: 2021-06-01

## 2021-06-01 DIAGNOSIS — E11.69 DIABETES MELLITUS TYPE 2 IN OBESE (HCC): Primary | ICD-10-CM

## 2021-06-01 DIAGNOSIS — E66.9 DIABETES MELLITUS TYPE 2 IN OBESE (HCC): Primary | ICD-10-CM

## 2021-06-01 RX ORDER — GLUCOSAMINE HCL/CHONDROITIN SU 500-400 MG
CAPSULE ORAL
Qty: 100 STRIP | Refills: 4 | Status: SHIPPED | OUTPATIENT
Start: 2021-06-01

## 2021-06-01 NOTE — TELEPHONE ENCOUNTER
Patient is requesting a refill on his accu check yan plus strips 100's to [facility]   Pended for provider approval

## 2021-06-01 NOTE — TELEPHONE ENCOUNTER
Pt stopped in - he needs a refill of Accu-chek Nancy plus strips 100s called into [facility] [phone]

## 2021-06-14 ENCOUNTER — OFFICE VISIT (OUTPATIENT)
Dept: FAMILY MEDICINE CLINIC | Facility: CLINIC | Age: 56
End: 2021-06-14
Payer: COMMERCIAL

## 2021-06-14 VITALS
HEIGHT: 71 IN | OXYGEN SATURATION: 98 % | SYSTOLIC BLOOD PRESSURE: 150 MMHG | WEIGHT: 196 LBS | HEART RATE: 88 BPM | TEMPERATURE: 97.3 F | DIASTOLIC BLOOD PRESSURE: 90 MMHG | RESPIRATION RATE: 16 BRPM | BODY MASS INDEX: 27.44 KG/M2

## 2021-06-14 DIAGNOSIS — I10 ESSENTIAL HYPERTENSION: ICD-10-CM

## 2021-06-14 DIAGNOSIS — K06.8 PAIN IN GUMS: Primary | ICD-10-CM

## 2021-06-14 PROCEDURE — 99213 OFFICE O/P EST LOW 20 MIN: CPT | Performed by: NURSE PRACTITIONER

## 2021-06-14 PROCEDURE — 3008F BODY MASS INDEX DOCD: CPT | Performed by: INTERNAL MEDICINE

## 2021-06-14 RX ORDER — SULFAMETHOXAZOLE AND TRIMETHOPRIM 800; 160 MG/1; MG/1
1 TABLET ORAL EVERY 12 HOURS SCHEDULED
Qty: 28 TABLET | Refills: 0 | Status: SHIPPED | OUTPATIENT
Start: 2021-06-14 | End: 2021-06-28

## 2021-06-14 RX ORDER — IBUPROFEN 800 MG/1
800 TABLET ORAL EVERY 6 HOURS PRN
Qty: 30 TABLET | Refills: 0 | Status: SHIPPED | OUTPATIENT
Start: 2021-06-14 | End: 2021-08-30 | Stop reason: CLARIF

## 2021-06-14 NOTE — ASSESSMENT & PLAN NOTE
Patient currently on BP medication of lisinopril 10 mg p o  daily, currently presents for evaluation of come pain  Patients  Blood pressure is 150/90

## 2021-06-14 NOTE — PATIENT INSTRUCTIONS
Mouth Care   WHAT YOU NEED TO KNOW:   Why is mouth care important? Mouth care prevents infection, plaque, bleeding gums, mouth sores, and cavities  It also freshens breath and improves appetite  Do mouth care in the morning, after each meal, and before bed each night  You may need more frequent mouth care if your mouth is in poor condition  What items will I need to do mouth care? · An electric or manual toothbrush    · Toothpaste, dental sticks, and floss    · A cup of water for rinsing    · Mouthwash     · Water-based lip balm or moisturizer    How do I brush my teeth? · Wet the toothbrush and place a small amount of toothpaste on it  · Gently place the brush on each tooth, and move it in a [place]  Do not press too hard  This may injure your gums  · Clean the inner, outer, and top surfaces of your teeth  Brush your gums and the top of your tongue  · Swish the water in your mouth and spit it out  Repeat this step with mouthwash  · Dry around your mouth  Apply water-based lip balm or moisturizer to your lips to prevent cracking and dryness  How do I floss my teeth? Thread the floss between each tooth, but do not push down on the gums too hard  This can cause gum damage  Make sure to floss on each side of every tooth  You may need to use waxed floss for easier movement between your teeth  What if I wear dentures? Remove the dentures from your mouth before you clean them  Moist dentures come out more easily  Drink a sip of water before you remove your dentures  Gently rock the dentures from side to side to loosen them  Then pull them out  · Brush the dentures with clean water and denture  or toothpaste  · Brush the dentures on all surfaces with cool water  Do not use hot water  Hot water could damage the dentures  Be careful not to bend any clasps on the dentures as you brush them  Rinse them   Place a thin layer of denture adhesive on the dentures and put them back in your mouth  Ask your healthcare provider which adhesive to use  · Soak the dentures in a denture solution each night after you brush them  Rinse them in cold water before you place them back in your mouth  When should I contact my healthcare provider? · You develop mouth sores  · Your dentures do not fit well  · You have pain with brushing  · You have questions or concerns about your condition or care  CARE AGREEMENT:   You have the right to help plan your care  Learn about your health condition and how it may be treated  Discuss treatment options with your healthcare providers to decide what care you want to receive  You always have the right to refuse treatment  The above information is an  only  It is not intended as medical advice for individual conditions or treatments  Talk to your doctor, nurse or pharmacist before following any medical regimen to see if it is safe and effective for you  © Copyright [address] Information is for End User's use only and may not be sold, redistributed or otherwise used for commercial purposes  All illustrations and images included in CareNotes® are the copyrighted property of A D A M , Inc  or [employer]   WHAT YOU NEED TO KNOW:   What are dental caries? Dental caries are also called cavities  Cavities are caused by bacteria  The bacteria mix with carbohydrates from foods and create acids  The acids break down areas of enamel, which covers the outside of a tooth  This creates a small hole in the tooth called a cavity  What increases my risk of dental caries? · Poor tooth care    · Not seeing your dentist every 6 months    · Gastric reflux    · Not enough fluoride in water or not using dental products with fluoride    · Not enough saliva in your mouth, caused by certain treatments or medicines    What are symptoms of dental caries?   You may not have any symptoms if your dental caries have just started to form  When dental caries reach deeper parts of your tooth, you may start to feel pain  The pain may get worse when you chew or eat hot or cold foods  How are dental caries diagnosed? Your dentist will look at your teeth to check for signs of dental caries  Your dentist may also use x-rays to find dental caries  How are dental caries treated? · Fluoride treatments  may be given during dental visits, or you may use products with fluoride at home  Fluoride can be found in the form of a mouth rinse or gel  You may buy fluoride with or without a dentist's order  Your dentist will tell you what kind of fluoride to buy and how to use it  · A filling  may be placed in your tooth after the decayed portion is removed  The filling may help to protect your tooth from more decay  How can I help prevent dental caries? · Brush your teeth at least 2 times a day with fluoride toothpaste  · Use dental floss to clean between your teeth at least once a day  · Rinse your mouth with water or mouthwash after meals and snacks  · Chew sugarless gum after meals and snacks  · See your dentist regularly every 6 months for dental cleanings and oral exams  When should I seek immediate care? · You have severe pain in your tooth or jaw  · You have swelling in your jaw or cheek  When should I contact my dentist?   · You have a fever  · Your tooth pain gets worse  · You have questions or concerns about your condition or care  CARE AGREEMENT:   You have the right to help plan your care  Learn about your health condition and how it may be treated  Discuss treatment options with your healthcare providers to decide what care you want to receive  You always have the right to refuse treatment  The above information is an  only  It is not intended as medical advice for individual conditions or treatments   Talk to your doctor, nurse or pharmacist before following any medical regimen to see if it is safe and effective for you  © Copyright [address] Information is for End User's use only and may not be sold, redistributed or otherwise used for commercial purposes  All illustrations and images included in CareNotes® are the copyrighted property of A D A M , Inc  or [address]  ÎÑÇÌ ÇáÓä   ÇáÑÚÇíÉ ÇáÎÇÑÌíÉ ááãÑÖì:   ÎÑÇÌ ÇáÓä åæ ÊÌãÚ ÇáÕÏíÏ ÏÇÎá ÇáÓä Ãæ Íæáå  íÍÏË ÎÑÇÌ ÇáÓä ÈÓÈÈ ÇáÈßÊíÑíÇ  íãßä ááÈßÊíÑíÇ Ãä ÊÏÎá ÇáÓä ÚäÏ ÊÚÑøõÖ ãíäÇ ÇáÃÓäÇä (ÇáÌÒÁ ÇáÎÇÑÌí ááÃÓäÇä) ááÊáÝ ÈÓÈÈ ÊÓæÓ ÇáÃÓäÇä  íãßä Ãä ÊÏÎá ÇáÈßÊíÑíÇ ÇáÓä ÃíÖðÇ ãä ÎáÇá ÑÞÇÆÞ ãæÌæÏÉ Ýí ÇáÓä Ãæ æÌæÏ ÌÑÍ Ýí ÇááËÉ  æÞÏ ÊÄÏí ÈÞÇíÇ ÇáØÚÇã [id number] Èíä ÇáÃÓäÇä áÝÊÑÉ ØæíáÉ Åáì ÇáÅÕÇÈÉ ÈÇáÎÑÇÌ  OCRPXYUT æÇáÃÚÑÇÖ ÇáÔÇÆÚÉ:  · Ãáã ÇáÃÓäÇä Ãæ ÃÓäÇä ãÞáÞáÉ Ãæ ÍÓÇÓíÉ ÇáÃÓäÇä ááÖÛØ Ãæ ÇáÍÑÇÑÉ    · ÇáäÝÓ ÇáßÑíå æÇáãÐÇÞ ÛíÑ ÇáãÓÊÍÈ æÓíáÇä ÇááÚÇÈ    · ÇáÍãì    · ÇáÃáã Ãæ PXAWEEVW Ãæ ÊæÑã ÇááËÉ Ãæ ÊæÑã ÇáæÌå æÇáÑÞÈÉ    · Ãáã ÚäÏ ÝÊÍ ÇáÝã Ãæ ÅÛáÇÞå    · æÌæÏ ÕÚæÈÉ Ýí ÝÊÍ ÇáÝã    ÇÍÕá Úáì ÑÚÇíÉ ÝæÑðÇ ÅÐÇ:  · ßäÊ ÊÚÇäí ãä Ãáã ÔÏíÏ Ýí ÃÓäÇäß Ãæ ÇáÝß  · æÇÌåÊ ãÔÇßá Ýí ÇáÊäÝÓ ÈÓÈÈ ÇáÃáã Ãæ ÇáÊæÑã  ÇÊÕá ÈÇáØÈíÈ ÇáãÊÇÈÚ áÍÇáÊß ÅÐÇ ÍÏË ãÇ íáí:  · ÊÊÝÇÞã ÇáÃÚÑÇÖ¡ ÍÊì ÈÚÏ ÇáÚáÇÌ  · ßäÊ ÊÚÇäí ãä äÒíÝ Ýí ÇáÝã  · áÇ ÊÓÊØíÚ ÊäÇæá ÇáØÚÇã Ãæ ÇáÔÑÇÈ ÈÓÈÈ ÇáÃáã Ãæ ÇáÊæÑã  · ÚÇÏ Åáíß ÇáÎÑÇÌ  · ÊÚÑÖÊ áÅÕÇÈÉ ÊÓÈÈÊ Ýí ÍÏæË ßÓÑ Ýí ÃÓäÇäß  · ßÇä áÏíß [id number] Ãæ ãÎÇæÝ ãÊÚáÞÉ ÈÍÇáÊß Ãæ ÈÇáÑÚÇíÉ ÇáãÞÏãÉ  ÇáÚáÇÌ: ÞÏ  ÊÍÊÇÌ áÃí ããÇ íáí:  · ÇáÃÏæíÉ ÞÏ ÊõæÕÝ áÚáÇÌ ÚÏæì ÈßÊíÑíÉ æÊÎÝíÝ ÇáÃáã  · ÇáÔÞ æÇáÊÕÑíÝ åæ ÞØÚ Ýí ÇáÎÑÇÌ ááÓãÇÍ ÈÊÕÑíÝ ÇáÕÏíÏ  ÞÏ íÊã ÃÎÐ ÚíäÉ ãä PVVKVE ÇáäÇÊÌ ãä ÇáÎÑÇÌ  íÊã ÅÑÓÇá EISXAK Åáì ãÎÊÈÑ æÇáÊÍÞÞ ãä æÌæÏ ÈßÊíÑíÇ  ÇØáÈ ãä ãÞÏã ÇáÑÚÇíÉ ÇáÕÍíÉ ÇáãÒíÏ ãä [id number]  · äÝÞ ÌÐÑ ÇáÓä ÚÈÇÑÉ Úä ÅÌÑÇÁ íÊã áÅÒÇáÉ ÇáÈßÊíÑíÇ æãäÚ ÊÝÇÞã ÇáÚÏæì  æÚÇÏÉ ãÇ íÊã ÈÚÏ ÇáÔÞ æÇáÊÕÑíÝ  æíÊã æÖÚ ÍÔæ Ãæ ÊÇÌ Úáì ÇáÓä ÈÚÏ ÇáÔÝÇÁ ãä äÝÞ ÌÐÑ ÇáÓä  · ÎáÚ ÇáÓä ÞÏ ÊÍÊÇÌ Åáíå ÅÐÇ ÃËÑÊ ÇáÚÏæì Úáì ÇáÃäÓÌÉ ÇáÃßËÑ ÚãÞðÇ   æåÐÇ ÚÇÏÉ Víctor Grebe ÈÚÏ ÇáÔÞ æÇáÊÕÑíÝ  ÇáÑÚÇíÉ ÇáÐÇÊíÉ:  · Þã ÈÔØÝ ÇáÝã ßá ÓÇÚÊíä ÈÇáãÇÁ ÇáãÇáÍ  ÝåÐÇ íÍÇÝÙ Úáì äÙÇÝÉ ÇáãäØÞÉ  · ÇÛÓá ÃÓäÇäß ÈáØÝ ãÑÊíä Ýí Çáíæã ÈÝÑÔÇÉ ÃÓäÇä äÇÚãÉ  ÝåÐÇ íÍÇÝÙ Úáì äÙÇÝÉ ÇáãäØÞÉ  · ÊäÇæá ÃØÚãÉ áíäÉ æÝÞðÇ ááÅÑÔÇÏÇÊ  ÞÏ áÇ ÊÓÈÈ ÇáÃØÚãÉ ÇááíäÉ ÃáãðÇ ßÈíÑðÇ  ãËá ÕáÕÉ ÇáÊÝÇÍ¡ æÇáÒÈÇÏí¡ [id number] BBVSZBOW  ÇÓÊÝÓÑ ãä ãÞÏã ÇáÑÚÇíÉ ÇáÕÍíÉ Úä ãÏÉ ÇÊÈÇÚ åÐå [id number]  · ÖÚ ßãÇÏÉ ÏÇÝÆÉ Úáì ÇáÃÓäÇä Ãæ ÇááËÉ  ÇÓÊÎÏã ßÑÉ ÞØäíÉ Ãæ ÔÇÔ ãäÞæÚðÇ Ýí ãÇÁ ÏÇÝÆ  Þã [id number] ÇáÖãÇÏÉ ÈÚÏ ãÑæÑ 10 ÏÞÇÆÞ Ãæ ÚäÏãÇ ÊÕÈÍ ÈÇÑÏÉ  ßÑÑ ÇáÃãÑ 3 ãÑÇÊ ÈÇáíæã  ãäÚ ÍÏæË ÎÑÇÌ ÂÎÑ:  · ÇÛÓá ÃÓäÇäß ãÑÊíä íæãíðÇ Úáì ÇáÃÞá ÈãÚÌæä ÃÓäÇä Ûäí [id number]  · ÇÓÊÎÏã ÎíØ ÊäÙíÝ ÇáÃÓäÇä ãÑÉ QRSFA Úáì ÇáÃÞá íæãíðÇ ááÊäÙíÝ Èíä ÇáÃÓäÇä  · ÇÛÓá Ýãß ÈÇáãÇÁ Ãæ ÛÓæá ÇáÝã ÈÚÏ ÇáæÌÈÇÊ ÇáÑÆíÓíÉ æÇáÎÝíÝÉ  ÇãÖÛ ÚáßÉ ÎÇáíÉ ãä ÇáÓßÑ  · ÊÝÇÏó ÊäÇæá ÇáÃØÚãÉ ÇáÓßÑíÉ Ãæ ÇáäÔæíÉ ÇáÊí íãßä Ãä ÊáÊÕÞ ÈÃÓäÇäß  Þáøöá ãä ÊäÇæá ÇáãÔÑæÈÇÊ ÇáÊí ÊÍÊæí Úáì ÇáÓßÑ¡ ãËá ÇáÕæÏÇ Ãæ ÚÕíÑ ÇáÝÇßåÉ  · Þã ÈÒíÇÑÉ ØÈíÈ ÇáÃÓäÇä ãÑÉ ßá 6 ÃÔåÑ áÊäÙíÝ ÃÓäÇäß æÅÌÑÇÁ [id number] ÇáÝãæíÉ  ÊÇÈÚ ãÚ ÇáØÈíÈ Ãæ ØÈíÈ ÇáÃÓäÇä æÝÞðÇ [id number] ÇáãÞÏãÉ: ÓíÍÊÇÌ ãÞÏã ÇáÑÚÇíÉ ÇáÕÍíÉ Åáì ÝÍÕ ÃÓäÇäß æáËÊß  íõÝÖá ÊÏæíä ÇáÃÓÆáÉ ÍÊì áÇ íÊã äÓíÇäåÇ ÃËäÇÁ ÒíÇÑÉ ÇáØÈíÈ  © Copyright [employer] 2021 Information is for End User's use only and may not be sold, redistributed or otherwise used for commercial purposes  All illustrations and images included in CareNotes® are the copyrighted property of A D A M , Inc  or [address] [place], [place] [address]samuel Jang   The above information is an  only  It is not intended as medical advice for individual conditions or treatments  Talk to your doctor, nurse or pharmacist before following any medical regimen to see if it is safe and effective for you

## 2021-06-14 NOTE — PROGRESS NOTES
BMI Counseling: Body mass index is 27 34 kg/m²  The BMI is above normal  Nutrition recommendations include decreasing portion sizes, encouraging healthy choices of fruits and vegetables, consuming healthier snacks, limiting drinks that contain sugar, moderation in carbohydrate intake, increasing intake of lean protein, reducing intake of saturated and trans fat and reducing intake of cholesterol  Exercise recommendations include vigorous physical activity 75 minutes/week, exercising 3-5 times per week, obtaining a gym membership and strength training exercises  No pharmacotherapy was ordered  Depression Screening and Follow-up Plan: Clincally patient does not have depression  No treatment is required  Tobacco Cessation Counseling: Tobacco cessation counseling was provided  The patient is sincerely urged to quit consumption of tobacco  He is ready to quit tobacco  Medication options and side effects of medication discussed  Patient refused medication  Smokes hookah 2 x a day  Assessment/Plan:         Problem List Items Addressed This Visit        Cardiovascular and Mediastinum    Essential hypertension     Patient currently on BP medication of lisinopril 10 mg p o  daily, currently presents for evaluation of come pain  Patients  Blood pressure is 150/90  Other    BMI 27 0-27 9,adult       BMI slightly lower from last visit  Prior BMI 29  Patient counseled on exercise diet and nutrition  Pain in gums - Primary       Patient placed on ibuprofen 800 mg every 8 hours as needed, Orajel for gum discuss comfort  Patient instructed follow-up with dental immediately  He indicates he does have an appointment with them tomorrow morning  Patient placed on antibiotics  Patient indicates reason he has not gone to see the dentist as because whenever he does go they take out his teeth           Relevant Medications    ibuprofen (MOTRIN) 800 mg tablet    benzocaine (ORAJEL) 10 % mucosal gel sulfamethoxazole-trimethoprim (BACTRIM DS) 800-160 mg per tablet            Subjective:      Patient ID: Radha Moore is a 48 y o  male  Patient is a 48year old male present for evaluation of right lower gum pain  Patient is to follow-up with his dentist in [address] [place] [address]  Patient indicates reason he has not gone to see the Dentist is because, whenever he does go they take out his teeth  The following portions of the patient's history were reviewed and updated as appropriate:   Past Medical History:  He has a past medical history of Aortic aneurysm ([facility] ), Decreased testosterone level, Diabetes mellitus ([facility] ), GERD (gastroesophageal reflux disease), High cholesterol, Hyperlipidemia, Hypertension, and Reduced libido ,  _______________________________________________________________________  Medical Problems:  does not have any pertinent problems on file ,  _______________________________________________________________________  Past Surgical History:   has a past surgical history that includes Colonoscopy (2015)  ,  _______________________________________________________________________  Family History:  family history includes Hypertension in his father and mother ,  _______________________________________________________________________  Social History:   reports that he has been smoking  He has never used smokeless tobacco  He reports that he does not drink alcohol and does not use drugs  ,  _______________________________________________________________________  Allergies:  is allergic to penicillins, metformin, and clindamycin     _______________________________________________________________________  Current Outpatient Medications   Medication Sig Dispense Refill    Empagliflozin (Jardiance) 25 MG TABS Take 1 tablet (25 mg total) by mouth every morning 30 tablet 5    Glucose Blood (BLOOD GLUCOSE TEST STRIPS) STRP Check sugar bid 100 strip 4    lisinopril (ZESTRIL) 10 mg tablet Take 1 tablet (10 mg total) by mouth daily 90 tablet 1    meclizine (ANTIVERT) 12 5 MG tablet Take 1 tablet (12 5 mg total) by mouth every 8 (eight) hours as needed for dizziness 30 tablet 1    omeprazole (PriLOSEC) 20 mg delayed release capsule Take 1 capsule (20 mg total) by mouth daily 90 capsule 2    pioglitazone (ACTOS) 15 mg tablet Take 1 tablet (15 mg total) by mouth daily 90 tablet 1    sildenafil (VIAGRA) 100 mg tablet TAKE 1 TABLET BY MOUTH EVERY 3 DAYS AS NEEDED 10 tablet 1    atorvastatin (LIPITOR) 10 mg tablet Take 1 tablet (10 mg total) by mouth daily (Patient not taking: Reported on 2/11/2021) 90 tablet 1    benzocaine (ORAJEL) 10 % mucosal gel Apply to right lower gum line daily  5 3 g 0    celecoxib (CeleBREX) 200 mg capsule Take 200 mg by mouth 2 (two) times a day      ibuprofen (MOTRIN) 800 mg tablet Take 1 tablet (800 mg total) by mouth every 6 (six) hours as needed for mild pain or moderate pain 30 tablet 0    sulfamethoxazole-trimethoprim (BACTRIM DS) 800-160 mg per tablet Take 1 tablet by mouth every 12 (twelve) hours for 14 days 28 tablet 0    traMADol-acetaminophen (ULTRACET) 37 5-325 mg per tablet TAKE 2 TABLETS BY MOUTH EVERY 4 TO 6 HOURS AS NEEDED  DO NOT EXCEED 8 TABLETS IN 24 HOURS       No current facility-administered medications for this visit      _______________________________________________________________________  Review of Systems   Constitutional: Negative for activity change, appetite change, chills, fatigue, fever and unexpected weight change  HENT: Positive for dental problem  Negative for congestion, ear discharge, ear pain, mouth sores, nosebleeds, postnasal drip, rhinorrhea, sinus pressure, sinus pain, sneezing, sore throat and voice change  Right lower gum swollen and inflamed  Painful to touch  Eyes: Negative for pain, redness and visual disturbance  Respiratory: Negative for cough, chest tightness, shortness of breath and wheezing      Cardiovascular: Negative for chest pain and palpitations  Gastrointestinal: Negative for abdominal distention, abdominal pain, constipation, diarrhea, nausea and vomiting  Endocrine: Negative  Genitourinary: Negative for decreased urine volume, difficulty urinating, dysuria, flank pain, frequency, hematuria and urgency  Musculoskeletal: Negative for arthralgias and myalgias  Skin: Negative  Allergic/Immunologic: Negative  Neurological: Negative  Hematological: Negative  Psychiatric/Behavioral: Negative  Objective:  Vitals:    06/14/21 1022   BP: 150/90   BP Location: Left arm   Patient Position: Sitting   Cuff Size: Large   Pulse: 88   Resp: 16   Temp: (!) 97 3 °F (36 3 °C)   TempSrc: Temporal   SpO2: 98%   Weight: 88 9 kg (196 lb)   Height: 5' 11" (1 803 m)     Body mass index is 27 34 kg/m²  Physical Exam  Vitals and nursing note reviewed  Constitutional:       Appearance: Normal appearance  He is well-developed and normal weight  HENT:      Head: Normocephalic and atraumatic  Comments: Right lower gum inflamed and reddened  Poor dentition of the lower teeth  Patient missing anterior teeth in the middle  Slight irritation pain with palpation noted  Right Ear: Tympanic membrane, ear canal and external ear normal       Left Ear: Tympanic membrane, ear canal and external ear normal       Nose: Nose normal       Mouth/Throat:      Mouth: Mucous membranes are moist    Eyes:      Extraocular Movements: Extraocular movements intact  Conjunctiva/sclera: Conjunctivae normal       Pupils: Pupils are equal, round, and reactive to light  Cardiovascular:      Rate and Rhythm: Normal rate and regular rhythm  Pulses: Normal pulses  Heart sounds: Normal heart sounds  No murmur heard  Pulmonary:      Effort: Pulmonary effort is normal       Breath sounds: Normal breath sounds  Abdominal:      General: Bowel sounds are normal       Palpations: Abdomen is soft     Musculoskeletal: General: Normal range of motion  Cervical back: Normal range of motion  Skin:     General: Skin is warm  Capillary Refill: Capillary refill takes less than 2 seconds  Neurological:      General: No focal deficit present  Mental Status: He is alert and oriented to person, place, and time     Psychiatric:         Mood and Affect: Mood normal          Behavior: Behavior normal

## 2021-06-14 NOTE — ASSESSMENT & PLAN NOTE
Patient placed on ibuprofen 800 mg every 8 hours as needed, Orajel for gum discuss comfort  Patient instructed follow-up with dental immediately  He indicates he does have an appointment with them tomorrow morning  Patient placed on antibiotics  Patient indicates reason he has not gone to see the dentist as because whenever he does go they take out his teeth

## 2021-06-30 ENCOUNTER — IMMUNIZATIONS (OUTPATIENT)
Dept: FAMILY MEDICINE CLINIC | Facility: HOSPITAL | Age: 56
End: 2021-06-30

## 2021-06-30 DIAGNOSIS — Z23 ENCOUNTER FOR IMMUNIZATION: Primary | ICD-10-CM

## 2021-06-30 PROCEDURE — 91300 SARS-COV-2 / COVID-19 MRNA VACCINE (PFIZER-BIONTECH) 30 MCG: CPT

## 2021-06-30 PROCEDURE — 0001A SARS-COV-2 / COVID-19 MRNA VACCINE (PFIZER-BIONTECH) 30 MCG: CPT

## 2021-07-21 ENCOUNTER — IMMUNIZATIONS (OUTPATIENT)
Dept: FAMILY MEDICINE CLINIC | Facility: HOSPITAL | Age: 56
End: 2021-07-21

## 2021-07-21 DIAGNOSIS — Z23 ENCOUNTER FOR IMMUNIZATION: Primary | ICD-10-CM

## 2021-07-21 PROCEDURE — 0002A SARS-COV-2 / COVID-19 MRNA VACCINE (PFIZER-BIONTECH) 30 MCG: CPT

## 2021-07-21 PROCEDURE — 91300 SARS-COV-2 / COVID-19 MRNA VACCINE (PFIZER-BIONTECH) 30 MCG: CPT

## 2021-08-04 ENCOUNTER — RA CDI HCC (OUTPATIENT)
Dept: OTHER | Facility: HOSPITAL | Age: 56
End: 2021-08-04

## 2021-08-04 NOTE — PROGRESS NOTES
[facility] [address]  coding opportunities             Chart Reviewed * (Number of) Inbasket suggestions sent to Provider: 1                  Patients insurance company: [employer] (Medicare and Commercial for [employer] and [employer])     Visit status: Patient ""no showed"" to the scheduled appointment     Provider never responded to [employer]  coding request     [facility] [place] [facility] [employer]  coding opportunities             Chart reviewed, (number of) suggestions sent to provider: 1      E11 66 Type 2 diabetes mellitus with hyperglycemia ([facility] )    If this is correct, please document and assess at your next visit  8/12/2021            Patients insurance company: [employer] (Medicare and Commercial for [employer] and [employer])

## 2021-08-30 ENCOUNTER — OFFICE VISIT (OUTPATIENT)
Dept: FAMILY MEDICINE CLINIC | Facility: CLINIC | Age: 56
End: 2021-08-30
Payer: COMMERCIAL

## 2021-08-30 VITALS
HEART RATE: 69 BPM | HEIGHT: 71 IN | SYSTOLIC BLOOD PRESSURE: 120 MMHG | OXYGEN SATURATION: 98 % | WEIGHT: 198.4 LBS | RESPIRATION RATE: 16 BRPM | BODY MASS INDEX: 27.77 KG/M2 | TEMPERATURE: 96.8 F | DIASTOLIC BLOOD PRESSURE: 78 MMHG

## 2021-08-30 DIAGNOSIS — I10 ESSENTIAL HYPERTENSION: ICD-10-CM

## 2021-08-30 DIAGNOSIS — E78.5 DYSLIPIDEMIA: ICD-10-CM

## 2021-08-30 DIAGNOSIS — E66.9 DIABETES MELLITUS TYPE 2 IN OBESE (HCC): Primary | ICD-10-CM

## 2021-08-30 DIAGNOSIS — E11.69 DIABETES MELLITUS TYPE 2 IN OBESE (HCC): Primary | ICD-10-CM

## 2021-08-30 LAB
SL AMB POCT GLUCOSE BLD: 161
SL AMB POCT HEMOGLOBIN AIC: 8.9 (ref ?–6.5)

## 2021-08-30 PROCEDURE — 3052F HG A1C>EQUAL 8.0%<EQUAL 9.0%: CPT | Performed by: INTERNAL MEDICINE

## 2021-08-30 PROCEDURE — 3008F BODY MASS INDEX DOCD: CPT | Performed by: INTERNAL MEDICINE

## 2021-08-30 PROCEDURE — 82948 REAGENT STRIP/BLOOD GLUCOSE: CPT | Performed by: INTERNAL MEDICINE

## 2021-08-30 PROCEDURE — 4004F PT TOBACCO SCREEN RCVD TLK: CPT | Performed by: INTERNAL MEDICINE

## 2021-08-30 PROCEDURE — 4010F ACE/ARB THERAPY RXD/TAKEN: CPT | Performed by: INTERNAL MEDICINE

## 2021-08-30 PROCEDURE — 99215 OFFICE O/P EST HI 40 MIN: CPT | Performed by: INTERNAL MEDICINE

## 2021-08-30 PROCEDURE — 3725F SCREEN DEPRESSION PERFORMED: CPT | Performed by: INTERNAL MEDICINE

## 2021-08-30 PROCEDURE — 3074F SYST BP LT 130 MM HG: CPT | Performed by: INTERNAL MEDICINE

## 2021-08-30 PROCEDURE — 3078F DIAST BP <80 MM HG: CPT | Performed by: INTERNAL MEDICINE

## 2021-08-30 PROCEDURE — 83036 HEMOGLOBIN GLYCOSYLATED A1C: CPT | Performed by: INTERNAL MEDICINE

## 2021-08-30 RX ORDER — LISINOPRIL 10 MG/1
10 TABLET ORAL DAILY
Qty: 90 TABLET | Refills: 1 | Status: SHIPPED | OUTPATIENT
Start: 2021-08-30

## 2021-08-30 RX ORDER — EMPAGLIFLOZIN 25 MG/1
25 TABLET, FILM COATED ORAL EVERY MORNING
Qty: 30 TABLET | Refills: 5
Start: 2021-08-30 | End: 2022-07-22

## 2021-08-30 NOTE — PROGRESS NOTES
Assessment/Plan:         Problem List Items Addressed This Visit        Endocrine    Diabetes mellitus type 2 in obese [facility]) - Primary       Lab Results   Component Value Date    HGBA1C 9 2 (H) 04/21/2021   a1c 8 9 from 9 2, fbs 160  not taking meds daily  Feeling ok  Patient remain noncompliant despite repeated counseling given to the patient about importance of blood sugar and taking regular meds  Check A1c and sugar here today  Drinks regular soda 2 times daily  counselling given on meds  Declined for insulin or trulicity  Relevant Medications    Empagliflozin (Jardiance) 25 MG TABS    Other Relevant Orders    POCT hemoglobin A1c (Completed)    POCT blood glucose (Completed)    HEMOGLOBIN A1C W/ EAG ESTIMATION    Basic metabolic panel       Cardiovascular and Mediastinum    Essential hypertension      He is taking lisinopril daily  Tolerating well  But he has not watching diet  Today blood pressure went up  Relevant Medications    lisinopril (ZESTRIL) 10 mg tablet       Other    Dyslipidemia            Subjective:      Patient ID: Olinda Blizzard is a 55 y o  male  1  Dm-2-  Patient remain noncompliant  He takes medicine sometimes only  No polyuria or polydipsia  No abdominal pain  No chest pain or dyspnea  No claudication pain  No numbness or tingling increased  No change in the vision  He is noncompliant with diet 2  2  htn-  Blood pressure is up today  Patient has a compliance issue  No headache dizziness or lightheadedness  No edema or syncope  No palpitations  No orthopnea  3  Dyslipidemia-  Patient stopped taking kept atorvastatin despite repeated counseling on importance of medications  No intolerance  No chest pain  No claudication pain  No muscle cramps        The following portions of the patient's history were reviewed and updated as appropriate:   Past Medical History:  He has a past medical history of Aortic aneurysm ([address] ), Decreased testosterone level, Diabetes mellitus ([address] ), GERD (gastroesophageal reflux disease), High cholesterol, Hyperlipidemia, Hypertension, and Reduced libido ,  _______________________________________________________________________  Medical Problems:  does not have any pertinent problems on file ,  _______________________________________________________________________  Past Surgical History:   has a past surgical history that includes Colonoscopy (2015)  ,  _______________________________________________________________________  Family History:  family history includes Hypertension in his father and mother ,  _______________________________________________________________________  Social History:   reports that he has been smoking  He has never used smokeless tobacco  He reports that he does not drink alcohol and does not use drugs  ,  _______________________________________________________________________  Allergies:  is allergic to penicillins, metformin, and clindamycin     _______________________________________________________________________  Current Outpatient Medications   Medication Sig Dispense Refill    Empagliflozin (Jardiance) 25 MG TABS Take 1 tablet (25 mg total) by mouth every morning 30 tablet 5    Glucose Blood (BLOOD GLUCOSE TEST STRIPS) STRP Check sugar bid 100 strip 4    lisinopril (ZESTRIL) 10 mg tablet Take 1 tablet (10 mg total) by mouth daily 90 tablet 1    omeprazole (PriLOSEC) 20 mg delayed release capsule Take 1 capsule (20 mg total) by mouth daily 90 capsule 2    pioglitazone (ACTOS) 15 mg tablet Take 1 tablet (15 mg total) by mouth daily 90 tablet 1    sildenafil (VIAGRA) 100 mg tablet TAKE 1 TABLET BY MOUTH EVERY 3 DAYS AS NEEDED 10 tablet 1    atorvastatin (LIPITOR) 10 mg tablet Take 1 tablet (10 mg total) by mouth daily (Patient not taking: Reported on 2/11/2021) 90 tablet 1    meclizine (ANTIVERT) 12 5 MG tablet Take 1 tablet (12 5 mg total) by mouth every 8 (eight) hours as needed for dizziness (Patient not taking: Reported on 8/30/2021) 30 tablet 1    tadalafil (CIALIS) 5 MG tablet Take 5 mg by mouth daily       No current facility-administered medications for this visit      _______________________________________________________________________  Review of Systems   Constitutional: Negative for chills, fatigue and fever  HENT: Negative for congestion, nosebleeds and rhinorrhea  Eyes: Negative for discharge and visual disturbance  Respiratory: Negative for cough, chest tightness, shortness of breath and wheezing  Cardiovascular: Negative for chest pain  Gastrointestinal: Negative for abdominal distention, abdominal pain, constipation, diarrhea and vomiting  Endocrine: Negative for polydipsia and polyuria  Genitourinary: Negative for difficulty urinating, frequency and hematuria  Musculoskeletal: Negative for arthralgias, back pain and myalgias  Pain in heel of the right foot- more if walks  Skin: Negative for rash  Allergic/Immunologic: Negative for environmental allergies  Neurological: Negative for dizziness, syncope, weakness, light-headedness and headaches  Hematological: Negative  Psychiatric/Behavioral: Negative for agitation, confusion, decreased concentration and dysphoric mood  The patient is not nervous/anxious  All other systems reviewed and are negative  Objective:  Vitals:    08/30/21 1042 08/30/21 1127   BP: (!) 170/110 120/78   BP Location: Left arm    Patient Position: Sitting    Cuff Size: Standard    Pulse: 69    Resp: 16    Temp: (!) 96 8 °F (36 °C)    TempSrc: Temporal    SpO2: 98%    Weight: 90 kg (198 lb 6 4 oz)    Height: 5' 11" (1 803 m)      Body mass index is 27 67 kg/m²  Physical Exam  Vitals and nursing note reviewed  Constitutional:       General: He is not in acute distress  Appearance: Normal appearance  He is well-developed  He is not ill-appearing  HENT:      Head: Normocephalic and atraumatic        Mouth/Throat: Mouth: Mucous membranes are moist    Eyes:      General:         Right eye: No discharge  Left eye: No discharge  Neck:      Thyroid: No thyromegaly  Cardiovascular:      Rate and Rhythm: Normal rate and regular rhythm  Pulses: Normal pulses  Heart sounds: Normal heart sounds  No murmur heard  Pulmonary:      Effort: Pulmonary effort is normal       Breath sounds: Normal breath sounds  No wheezing or rhonchi  Abdominal:      General: Bowel sounds are normal  There is no distension  Palpations: Abdomen is soft  Tenderness: There is no abdominal tenderness  Musculoskeletal:         General: Tenderness (right heel bottom- plator fascitis) present  No swelling  Cervical back: Neck supple  Right lower leg: No edema  Left lower leg: No edema  Lymphadenopathy:      Cervical: No cervical adenopathy  Skin:     General: Skin is warm  Capillary Refill: Capillary refill takes less than 2 seconds  Findings: No erythema  Neurological:      General: No focal deficit present  Mental Status: He is alert and oriented to person, place, and time  Psychiatric:         Mood and Affect: Mood normal          Behavior: Behavior normal          Thought Content:  Thought content normal

## 2021-08-30 NOTE — ASSESSMENT & PLAN NOTE
Lab Results   Component Value Date    HGBA1C 9 2 (H) 04/21/2021   a1c 8 9 from 9 2, fbs 160  not taking meds daily  Feeling ok  Patient remain noncompliant despite repeated counseling given to the patient about importance of blood sugar and taking regular meds  Check A1c and sugar here today  Drinks regular soda 2 times daily  counselling given on meds  Declined for insulin or trulicity

## 2021-08-30 NOTE — ASSESSMENT & PLAN NOTE
He is taking lisinopril daily  Tolerating well  But he has not watching diet  Today blood pressure went up

## 2021-10-24 ENCOUNTER — APPOINTMENT (EMERGENCY)
Dept: VASCULAR ULTRASOUND | Facility: HOSPITAL | Age: 56
End: 2021-10-24
Payer: COMMERCIAL

## 2021-10-24 ENCOUNTER — HOSPITAL ENCOUNTER (EMERGENCY)
Facility: HOSPITAL | Age: 56
Discharge: HOME | End: 2021-10-24
Attending: EMERGENCY MEDICINE | Admitting: EMERGENCY MEDICINE
Payer: COMMERCIAL

## 2021-10-24 VITALS
DIASTOLIC BLOOD PRESSURE: 79 MMHG | WEIGHT: 210 LBS | HEIGHT: 71 IN | HEART RATE: 70 BPM | TEMPERATURE: 98 F | SYSTOLIC BLOOD PRESSURE: 168 MMHG | OXYGEN SATURATION: 98 % | BODY MASS INDEX: 29.4 KG/M2 | RESPIRATION RATE: 18 BRPM

## 2021-10-24 DIAGNOSIS — M79.662 PAIN OF LEFT CALF: Primary | ICD-10-CM

## 2021-10-24 DIAGNOSIS — M79.672 LEFT FOOT PAIN: ICD-10-CM

## 2021-10-24 LAB — GLUCOSE SERPL-MCNC: 297 MG/DL (ref 65–140)

## 2021-10-24 PROCEDURE — 82948 REAGENT STRIP/BLOOD GLUCOSE: CPT

## 2021-10-24 PROCEDURE — 99284 EMERGENCY DEPT VISIT MOD MDM: CPT | Performed by: EMERGENCY MEDICINE

## 2021-10-24 PROCEDURE — 93971 EXTREMITY STUDY: CPT

## 2021-10-24 PROCEDURE — 99284 EMERGENCY DEPT VISIT MOD MDM: CPT

## 2021-10-25 PROCEDURE — 93971 EXTREMITY STUDY: CPT | Performed by: SURGERY

## 2021-11-13 NOTE — PROGRESS NOTES
[facility]  coding opportunities          Chart reviewed, no opportunity found: CHART REVIEWED, NO OPPORTUNITY FOUND              Patients insurance company: [employer] (Medicare and Commercial for [employer] and [employer])
Alert and oriented, no focal deficits, no motor or sensory deficits.

## 2021-11-23 ENCOUNTER — RA CDI HCC (OUTPATIENT)
Dept: OTHER | Facility: HOSPITAL | Age: 56
End: 2021-11-23

## 2021-11-30 ENCOUNTER — OFFICE VISIT (OUTPATIENT)
Dept: FAMILY MEDICINE CLINIC | Facility: CLINIC | Age: 56
End: 2021-11-30
Payer: COMMERCIAL

## 2021-11-30 VITALS
WEIGHT: 201.2 LBS | TEMPERATURE: 96.8 F | DIASTOLIC BLOOD PRESSURE: 70 MMHG | HEART RATE: 68 BPM | SYSTOLIC BLOOD PRESSURE: 110 MMHG | HEIGHT: 71 IN | BODY MASS INDEX: 28.17 KG/M2 | OXYGEN SATURATION: 97 % | RESPIRATION RATE: 16 BRPM

## 2021-11-30 DIAGNOSIS — I10 ESSENTIAL HYPERTENSION: ICD-10-CM

## 2021-11-30 DIAGNOSIS — E66.9 DIABETES MELLITUS TYPE 2 IN OBESE (HCC): Primary | ICD-10-CM

## 2021-11-30 DIAGNOSIS — E11.69 DIABETES MELLITUS TYPE 2 IN OBESE (HCC): Primary | ICD-10-CM

## 2021-11-30 DIAGNOSIS — E78.5 DYSLIPIDEMIA: ICD-10-CM

## 2021-11-30 LAB
SL AMB POCT GLUCOSE BLD: 171
SL AMB POCT HEMOGLOBIN AIC: 9.9 (ref ?–6.5)

## 2021-11-30 PROCEDURE — 82948 REAGENT STRIP/BLOOD GLUCOSE: CPT | Performed by: INTERNAL MEDICINE

## 2021-11-30 PROCEDURE — 83036 HEMOGLOBIN GLYCOSYLATED A1C: CPT | Performed by: INTERNAL MEDICINE

## 2021-11-30 PROCEDURE — 3074F SYST BP LT 130 MM HG: CPT | Performed by: INTERNAL MEDICINE

## 2021-11-30 PROCEDURE — 3046F HEMOGLOBIN A1C LEVEL >9.0%: CPT | Performed by: INTERNAL MEDICINE

## 2021-11-30 PROCEDURE — 3008F BODY MASS INDEX DOCD: CPT | Performed by: INTERNAL MEDICINE

## 2021-11-30 PROCEDURE — 99214 OFFICE O/P EST MOD 30 MIN: CPT | Performed by: INTERNAL MEDICINE

## 2021-11-30 PROCEDURE — 3078F DIAST BP <80 MM HG: CPT | Performed by: INTERNAL MEDICINE

## 2021-11-30 PROCEDURE — 4004F PT TOBACCO SCREEN RCVD TLK: CPT | Performed by: INTERNAL MEDICINE

## 2021-11-30 RX ORDER — GLIMEPIRIDE 4 MG/1
4 TABLET ORAL
Qty: 30 TABLET | Refills: 5 | Status: SHIPPED | OUTPATIENT
Start: 2021-11-30

## 2021-12-10 ENCOUNTER — APPOINTMENT (OUTPATIENT)
Dept: LAB | Facility: CLINIC | Age: 56
End: 2021-12-10
Payer: COMMERCIAL

## 2021-12-10 DIAGNOSIS — N50.9 DISEASE OF SEMINAL VESICLE: ICD-10-CM

## 2021-12-10 DIAGNOSIS — E66.9 DIABETES MELLITUS TYPE 2 IN OBESE (HCC): ICD-10-CM

## 2021-12-10 DIAGNOSIS — E11.69 DIABETES MELLITUS TYPE 2 IN OBESE (HCC): ICD-10-CM

## 2021-12-10 LAB
ANION GAP SERPL CALCULATED.3IONS-SCNC: 8 MMOL/L (ref 4–13)
BUN SERPL-MCNC: 19 MG/DL (ref 5–25)
CALCIUM SERPL-MCNC: 8.6 MG/DL (ref 8.3–10.1)
CHLORIDE SERPL-SCNC: 102 MMOL/L (ref 100–108)
CO2 SERPL-SCNC: 28 MMOL/L (ref 21–32)
CREAT SERPL-MCNC: 0.94 MG/DL (ref 0.6–1.3)
GFR SERPL CREATININE-BSD FRML MDRD: 91 ML/MIN/1.73SQ M
GLUCOSE P FAST SERPL-MCNC: 169 MG/DL (ref 65–99)
POTASSIUM SERPL-SCNC: 4.1 MMOL/L (ref 3.5–5.3)
SODIUM SERPL-SCNC: 138 MMOL/L (ref 136–145)

## 2021-12-10 PROCEDURE — 84402 ASSAY OF FREE TESTOSTERONE: CPT

## 2021-12-10 PROCEDURE — 80048 BASIC METABOLIC PNL TOTAL CA: CPT

## 2021-12-10 PROCEDURE — 36415 COLL VENOUS BLD VENIPUNCTURE: CPT

## 2021-12-10 PROCEDURE — 84403 ASSAY OF TOTAL TESTOSTERONE: CPT

## 2021-12-11 LAB
TESTOST FREE SERPL-MCNC: 8.1 PG/ML (ref 7.2–24)
TESTOST SERPL-MCNC: 262 NG/DL (ref 264–916)

## 2021-12-22 ENCOUNTER — APPOINTMENT (OUTPATIENT)
Dept: LAB | Facility: CLINIC | Age: 56
End: 2021-12-22
Payer: COMMERCIAL

## 2021-12-22 ENCOUNTER — OFFICE VISIT (OUTPATIENT)
Dept: FAMILY MEDICINE CLINIC | Facility: CLINIC | Age: 56
End: 2021-12-22
Payer: COMMERCIAL

## 2021-12-22 VITALS
TEMPERATURE: 97.7 F | WEIGHT: 202 LBS | DIASTOLIC BLOOD PRESSURE: 68 MMHG | HEART RATE: 70 BPM | BODY MASS INDEX: 28.28 KG/M2 | HEIGHT: 71 IN | SYSTOLIC BLOOD PRESSURE: 122 MMHG | RESPIRATION RATE: 18 BRPM | OXYGEN SATURATION: 98 %

## 2021-12-22 DIAGNOSIS — E11.65 TYPE 2 DIABETES MELLITUS WITH HYPERGLYCEMIA, WITHOUT LONG-TERM CURRENT USE OF INSULIN (HCC): ICD-10-CM

## 2021-12-22 DIAGNOSIS — R52 BODY ACHES: Primary | ICD-10-CM

## 2021-12-22 DIAGNOSIS — E66.3 OVERWEIGHT WITH BODY MASS INDEX (BMI) OF 28 TO 28.9 IN ADULT: ICD-10-CM

## 2021-12-22 DIAGNOSIS — I10 ESSENTIAL HYPERTENSION: ICD-10-CM

## 2021-12-22 DIAGNOSIS — Z13.89 SCREENING FOR BLOOD OR PROTEIN IN URINE: ICD-10-CM

## 2021-12-22 LAB
ALBUMIN SERPL BCP-MCNC: 3.7 G/DL (ref 3.5–5)
ALP SERPL-CCNC: 78 U/L (ref 46–116)
ALT SERPL W P-5'-P-CCNC: 22 U/L (ref 12–78)
ANION GAP SERPL CALCULATED.3IONS-SCNC: 10 MMOL/L (ref 4–13)
AST SERPL W P-5'-P-CCNC: 12 U/L (ref 5–45)
BACTERIA UR QL AUTO: NORMAL /HPF
BASOPHILS # BLD AUTO: 0.07 THOUSANDS/ΜL (ref 0–0.1)
BASOPHILS NFR BLD AUTO: 1 % (ref 0–1)
BILIRUB SERPL-MCNC: 0.45 MG/DL (ref 0.2–1)
BILIRUB UR QL STRIP: NEGATIVE
BUN SERPL-MCNC: 12 MG/DL (ref 5–25)
CALCIUM SERPL-MCNC: 9.1 MG/DL (ref 8.3–10.1)
CHLORIDE SERPL-SCNC: 101 MMOL/L (ref 100–108)
CLARITY UR: CLEAR
CO2 SERPL-SCNC: 28 MMOL/L (ref 21–32)
COLOR UR: ABNORMAL
CREAT SERPL-MCNC: 1.09 MG/DL (ref 0.6–1.3)
EOSINOPHIL # BLD AUTO: 0.08 THOUSAND/ΜL (ref 0–0.61)
EOSINOPHIL NFR BLD AUTO: 1 % (ref 0–6)
ERYTHROCYTE [DISTWIDTH] IN BLOOD BY AUTOMATED COUNT: 12.9 % (ref 11.6–15.1)
GFR SERPL CREATININE-BSD FRML MDRD: 76 ML/MIN/1.73SQ M
GLUCOSE SERPL-MCNC: 139 MG/DL (ref 65–140)
GLUCOSE UR STRIP-MCNC: ABNORMAL MG/DL
HCT VFR BLD AUTO: 48.1 % (ref 36.5–49.3)
HGB BLD-MCNC: 15.5 G/DL (ref 12–17)
HGB UR QL STRIP.AUTO: NEGATIVE
IMM GRANULOCYTES # BLD AUTO: 0.03 THOUSAND/UL (ref 0–0.2)
IMM GRANULOCYTES NFR BLD AUTO: 0 % (ref 0–2)
KETONES UR STRIP-MCNC: NEGATIVE MG/DL
LEUKOCYTE ESTERASE UR QL STRIP: ABNORMAL
LYMPHOCYTES # BLD AUTO: 1.47 THOUSANDS/ΜL (ref 0.6–4.47)
LYMPHOCYTES NFR BLD AUTO: 19 % (ref 14–44)
MCH RBC QN AUTO: 26.3 PG (ref 26.8–34.3)
MCHC RBC AUTO-ENTMCNC: 32.2 G/DL (ref 31.4–37.4)
MCV RBC AUTO: 82 FL (ref 82–98)
MONOCYTES # BLD AUTO: 0.62 THOUSAND/ΜL (ref 0.17–1.22)
MONOCYTES NFR BLD AUTO: 8 % (ref 4–12)
NEUTROPHILS # BLD AUTO: 5.29 THOUSANDS/ΜL (ref 1.85–7.62)
NEUTS SEG NFR BLD AUTO: 71 % (ref 43–75)
NITRITE UR QL STRIP: NEGATIVE
NON-SQ EPI CELLS URNS QL MICRO: NORMAL /HPF
NRBC BLD AUTO-RTO: 0 /100 WBCS
PH UR STRIP.AUTO: 7 [PH]
PLATELET # BLD AUTO: 270 THOUSANDS/UL (ref 149–390)
PMV BLD AUTO: 10.8 FL (ref 8.9–12.7)
POTASSIUM SERPL-SCNC: 4 MMOL/L (ref 3.5–5.3)
PROT SERPL-MCNC: 8.1 G/DL (ref 6.4–8.2)
PROT UR STRIP-MCNC: NEGATIVE MG/DL
RBC # BLD AUTO: 5.9 MILLION/UL (ref 3.88–5.62)
RBC #/AREA URNS AUTO: NORMAL /HPF
SL AMB POCT GLUCOSE BLD: 123
SODIUM SERPL-SCNC: 139 MMOL/L (ref 136–145)
SP GR UR STRIP.AUTO: 1.01 (ref 1–1.03)
UROBILINOGEN UR QL STRIP.AUTO: 0.2 E.U./DL
WBC # BLD AUTO: 7.56 THOUSAND/UL (ref 4.31–10.16)
WBC #/AREA URNS AUTO: NORMAL /HPF

## 2021-12-22 PROCEDURE — 36415 COLL VENOUS BLD VENIPUNCTURE: CPT

## 2021-12-22 PROCEDURE — 81001 URINALYSIS AUTO W/SCOPE: CPT | Performed by: NURSE PRACTITIONER

## 2021-12-22 PROCEDURE — 80053 COMPREHEN METABOLIC PANEL: CPT

## 2021-12-22 PROCEDURE — 99215 OFFICE O/P EST HI 40 MIN: CPT | Performed by: NURSE PRACTITIONER

## 2021-12-22 PROCEDURE — 0241U HB NFCT DS VIR RESP RNA 4 TRGT: CPT | Performed by: NURSE PRACTITIONER

## 2021-12-22 PROCEDURE — 82948 REAGENT STRIP/BLOOD GLUCOSE: CPT | Performed by: NURSE PRACTITIONER

## 2021-12-22 PROCEDURE — 85025 COMPLETE CBC W/AUTO DIFF WBC: CPT

## 2021-12-24 LAB
FLUAV RNA RESP QL NAA+PROBE: NEGATIVE
FLUBV RNA RESP QL NAA+PROBE: NEGATIVE
RSV RNA RESP QL NAA+PROBE: NEGATIVE
SARS-COV-2 RNA RESP QL NAA+PROBE: POSITIVE

## 2021-12-30 ENCOUNTER — TELEMEDICINE (OUTPATIENT)
Dept: FAMILY MEDICINE CLINIC | Facility: CLINIC | Age: 56
End: 2021-12-30
Payer: COMMERCIAL

## 2021-12-30 VITALS — HEIGHT: 71 IN | BODY MASS INDEX: 28.28 KG/M2 | WEIGHT: 202 LBS

## 2021-12-30 DIAGNOSIS — U07.1 TELEHEALTH ENCOUNTER FOR CONFIRMED COVID-19: ICD-10-CM

## 2021-12-30 DIAGNOSIS — U07.1 COVID-19: Primary | ICD-10-CM

## 2021-12-30 PROCEDURE — 3008F BODY MASS INDEX DOCD: CPT | Performed by: INTERNAL MEDICINE

## 2021-12-30 PROCEDURE — 99213 OFFICE O/P EST LOW 20 MIN: CPT | Performed by: NURSE PRACTITIONER

## 2022-03-01 ENCOUNTER — OFFICE VISIT (OUTPATIENT)
Dept: FAMILY MEDICINE CLINIC | Facility: CLINIC | Age: 57
End: 2022-03-01
Payer: COMMERCIAL

## 2022-03-01 VITALS
BODY MASS INDEX: 28.28 KG/M2 | RESPIRATION RATE: 16 BRPM | SYSTOLIC BLOOD PRESSURE: 130 MMHG | WEIGHT: 202 LBS | HEART RATE: 68 BPM | TEMPERATURE: 96.8 F | DIASTOLIC BLOOD PRESSURE: 80 MMHG | HEIGHT: 71 IN | OXYGEN SATURATION: 97 %

## 2022-03-01 DIAGNOSIS — Z12.11 SCREENING FOR COLON CANCER: ICD-10-CM

## 2022-03-01 DIAGNOSIS — Z12.5 ENCOUNTER FOR PROSTATE CANCER SCREENING: ICD-10-CM

## 2022-03-01 DIAGNOSIS — N52.1 ERECTILE DYSFUNCTION DUE TO DIABETES MELLITUS (HCC): ICD-10-CM

## 2022-03-01 DIAGNOSIS — E11.69 ERECTILE DYSFUNCTION DUE TO DIABETES MELLITUS (HCC): ICD-10-CM

## 2022-03-01 DIAGNOSIS — E11.65 TYPE 2 DIABETES MELLITUS WITH HYPERGLYCEMIA, WITHOUT LONG-TERM CURRENT USE OF INSULIN (HCC): Primary | ICD-10-CM

## 2022-03-01 PROCEDURE — 3079F DIAST BP 80-89 MM HG: CPT | Performed by: INTERNAL MEDICINE

## 2022-03-01 PROCEDURE — 3008F BODY MASS INDEX DOCD: CPT | Performed by: INTERNAL MEDICINE

## 2022-03-01 PROCEDURE — 99214 OFFICE O/P EST MOD 30 MIN: CPT | Performed by: INTERNAL MEDICINE

## 2022-03-01 PROCEDURE — 3075F SYST BP GE 130 - 139MM HG: CPT | Performed by: INTERNAL MEDICINE

## 2022-03-01 PROCEDURE — 4004F PT TOBACCO SCREEN RCVD TLK: CPT | Performed by: INTERNAL MEDICINE

## 2022-03-01 RX ORDER — TADALAFIL 20 MG/1
20 TABLET ORAL DAILY PRN
Qty: 10 TABLET | Refills: 0 | Status: SHIPPED | OUTPATIENT
Start: 2022-03-01 | End: 2022-05-11 | Stop reason: SDUPTHER

## 2022-03-01 NOTE — PATIENT INSTRUCTIONS
Type 2 Diabetes in Adults: New Diagnosis   AMBULATORY CARE:   Type 2 diabetes  is a disease that affects how your body uses glucose (sugar)  Normally, when the blood sugar level increases, the pancreas makes more insulin  Insulin helps move sugar out of the blood so it can be used for energy  Type 2 diabetes develops because either the body cannot make enough insulin, or it cannot use the insulin correctly  Type 2 diabetes can be controlled to prevent damage to your heart, blood vessels, and other organs  Common symptoms include the following:   · Numbness in your fingers or toes    · Blurred vision    · Frequent urination    · More hunger or thirst than usual    Have someone call your local emergency number (911 in the [address] [facility]) if:   · You have any of the following signs of a stroke:      ? Numbness or drooping on one side of your face     ? Weakness in an arm or leg    ? Confusion or difficulty speaking    ? Dizziness, a severe headache, or vision loss    · You have any of the following signs of a heart attack:      ? Squeezing, pressure, or pain in your chest    ? You may  also have any of the following:     § Discomfort or pain in your back, neck, jaw, stomach, or arm    § Shortness of breath    § Nausea or vomiting    § Lightheadedness or a sudden cold sweat    · You have trouble breathing  Seek care immediately if:   · You have severe abdominal pain  · You vomit for more than 2 hours  · You have trouble staying awake or focusing  · You are shaking or sweating  · You feel weak or more tired than usual     · You have blurred or double vision  · Your breath has a fruity, sweet smell  Call your doctor or diabetes care team if:   · Your arms and legs are swollen  · You have an upset stomach and cannot eat the foods on your meal plan  · You feel dizzy, have headaches, or are easily irritated  · Your skin is red, warm, dry, or swollen      · You have a wound that does not heal     · You have numbness in your arms or legs  · You have trouble coping with your illness, or you feel anxious or depressed  · You have questions or concerns about your condition or care  Treatment for type 2 diabetes  helps prevent or delay complications, including heart and kidney disease  You must eat healthy meals and do regular physical activity  You may  need any of the following:  · Diabetes medicines or insulin  may be given to decrease the amount of sugar in your blood  · Blood pressure medicine  may be given to lower your blood pressure  · Cholesterol-lowering medicine  may be given to prevent heart disease  · Antiplatelets , such as aspirin, help prevent blood clots  Take your antiplatelet medicine exactly as directed  These medicines make it more likely for you to bleed or bruise  If you are told to take aspirin, do not take acetaminophen or ibuprofen instead  · Take your medicine as directed  Contact your healthcare provider if you think your medicine is not helping or if you have side effects  Tell him or her if you are allergic to any medicine  Keep a list of the medicines, vitamins, and herbs you take  Include the amounts, and when and why you take them  Bring the list or the pill bottles to follow-up visits  Carry your medicine list with you in case of an emergency  Diabetes education:  Diabetes education will start right away  Diabetes education may also happen later to refresh your memory  Your diabetes care team may include physicians, nurse practitioners, and physician assistants  It may also include nurses, dietitians, exercise specialists, pharmacists, dentists, and podiatrists  Family members, or others who are close to you, may also be part of the team  You and your team will make goals and plans to manage diabetes and other health problems  The plans and goals will be specific to your needs   Members of your diabetes care team teach you the following:  · About nutrition:  A dietitian will help you make a meal plan to keep your blood sugar level steady  You will learn how food affects your blood sugar levels  You will also learn to keep track of sugar and starchy foods (carbohydrates)  Do not skip meals  Your blood sugar level may drop too low if you have taken diabetes medicine and do not eat  You may be taught to use the plate method for portion control  With the plate method, ½ of your plate contains vegetables  The other half is divided so that ¼ contains protein or meat, and ¼ contains starches, such as potatoes  · About physical activity and diabetes: You will learn why physical activity, such as walking, is important  You and your care team provider will make a plan for your activity  Your care team provider will tell you what a healthy weight is for you  He or she will help you make a plan to get to that weight and stay there  Maintain a healthy weight to help delay or prevent complications of diabetes  · About your blood sugar level: You will learn what your blood sugar level should be  You will be given information on when and how to check your blood sugar level  You may need to check by testing a drop of blood in a glucose monitor  You may instead be given a continuous glucose monitoring (CGM) device  A sensor is placed in your abdomen or on your arm  You put a transmitter on the sensor to get a reading that shows up on the monitor  You will learn what to do if your level is too high or too low  Write down the times of your checks and your levels  Take them to all follow-up appointments  · About diabetes medicine: You may need oral diabetes medicine, insulin, or both to help control your blood sugar levels  Your healthcare provider will teach you how and when to take oral diabetes medicine  You will also be taught about side effects oral diabetes medicine can cause   Insulin may be added if oral diabetes medicine becomes less effective over time  Insulin may be injected, or given through a pump or pen  You and your care team will discuss which method is best for you  ? An insulin pump  is an implanted device that gives your insulin 24 hours a day  An insulin pump prevents the need for multiple insulin injections in a day  ? An insulin pen  is a device prefilled with the right amount of insulin  ? You and your family members will be taught how to draw up and give insulin  if this is the best method for you  Your education team will also teach you how to dispose of needles and syringes  ? You will learn how much insulin you need  and when to give it  You will be taught when not to give insulin  You will also be taught what to do if your blood sugar level drops too low  This may happen if you take insulin and do not eat the right amount of carbohydrates  Other ways to help manage type 2 diabetes:   · Talk to your care team if you have increased stress about your diagnosis  When you feel stressed about your diagnosis, it can cause you not to take care of yourself properly  Your care team can help by offering tips about self-care  Your care team may suggest you talk to a mental health provider  The provider can listen and offer help with self-care issues  · Check your feet each day for sores  Wear shoes and socks that fit correctly  Do not trim your toenails  Go to a podiatrist  Ask your care team for more information about foot care  · Do not smoke  Nicotine and other chemicals in cigarettes and cigars can cause lung damage and make diabetes harder to manage  Ask your care team provider for information if you currently smoke and need help to quit  E-cigarettes or smokeless tobacco still contain nicotine  Talk to your care team provider before you use these products  · Drink water instead of sugary drinks such as soft drinks and fruit juices    Sugary drinks cause high blood sugar and cause an increase in your weight  Your healthcare provider may tell you that diet drinks do not help with weight loss  · Know the risks if you choose to drink alcohol  Alcohol can cause your blood sugar levels to be low if you use insulin  Alcohol can cause high blood sugar levels and weight gain if you drink too much  A drink of alcohol is 12 ounces of beer, 5 ounces of wine, or 1½ ounces of liquor  Your care team can tell you how many drinks are okay to have in 24 hours and in 1 week  · Check your blood pressure as directed  If you have high blood pressure (BP), talk to your care team about your BP goals  Together you can create a plan to lower your BP if needed and keep it in a healthy range  The plan may include lifestyle changes or medicines  A normal BP is 119/79 or lower  A normal blood pressure can help prevent or delay certain complications from diabetes  Examples include retinopathy (eye damage) and kidney damage  · Wear medical alert identification  Wear medical alert jewelry or carry a card that says you have diabetes  Ask your care team provider where to get these items  · Ask about vaccines you may need  You have a higher risk for serious illness if you get the flu, pneumonia, COVID-19, or hepatitis  Ask your provider if you should get a flu, pneumonia, or hepatitis B vaccine, and when to get the COVID-19 vaccine  Risks of type 2 diabetes: It is important to learn to keep your diabetes in control  Uncontrolled diabetes can damage your nerves, veins, and arteries  Your risk for dementia increases faster the longer your diabetes is not controlled  High blood sugar levels may damage other body tissues and organs over time  Damage to arteries may increase your risk for heart attack and stroke  Nerve damage may also lead to other heart, stomach, and nerve problems  Diabetes can become life-threatening if it is not controlled  Follow up with your care team providers as directed:   You may need to return to have your A1c checked every 3 months  You will need to have your feet checked during at least 1 visit each year  You will need an eye exam 1 time each year to check for retinopathy  You will also need urine tests every year to check for kidney problems  You may need tests to monitor for heart disease, such as an EKG, stress test, blood pressure monitoring, and blood tests  Write down your questions so you remember to ask them during your visits  © Copyright [employer] 2022 Information is for End User's use only and may not be sold, redistributed or otherwise used for commercial purposes  All illustrations and images included in CareNotes® are the copyrighted property of [employer]  or [address] [place], [place] [address]   The above information is an  only  It is not intended as medical advice for individual conditions or treatments  Talk to your doctor, nurse or pharmacist before following any medical regimen to see if it is safe and effective for you

## 2022-03-01 NOTE — PROGRESS NOTES
BMI Counseling: Body mass index is 28 17 kg/m²  The BMI is above normal  Nutrition recommendations include decreasing portion sizes, decreasing fast food intake, consuming healthier snacks, limiting drinks that contain sugar, moderation in carbohydrate intake and reducing intake of cholesterol  Exercise recommendations include exercising 3-5 times per week  Rationale for BMI follow-up plan is due to patient being overweight or obese  Patient's shoes and socks removed  Right Foot/Ankle   Right Foot Inspection  Skin Exam: skin normal and skin intact  No dry skin, no warmth, no callus, no erythema, no maceration, no abnormal color, no pre-ulcer, no ulcer and no callus  Toe Exam: ROM and strength within normal limits  Sensory   Monofilament testing: intact    Vascular  Capillary refills: < 3 seconds  The right DP pulse is 1+  Left Foot/Ankle  Left Foot Inspection  Skin Exam: skin normal and skin intact  No dry skin, no warmth, no erythema, no maceration, normal color, no pre-ulcer, no ulcer and no callus  Toe Exam: ROM and strength within normal limits  Sensory   Monofilament testing: intact    Vascular  Capillary refills: < 3 seconds  The left DP pulse is 1+       Assign Risk Category  No deformity present  No loss of protective sensation  No weak pulses  Risk: 0   Assessment/Plan:         Problem List Items Addressed This Visit        Endocrine    Erectile dysfunction due to diabetes mellitus (HCC)    Relevant Medications    tadalafil (CIALIS) 20 MG tablet    Type 2 diabetes mellitus with hyperglycemia (HCC) - Primary    Relevant Orders    CBC and differential    Comprehensive metabolic panel    TSH, 3rd generation    Lipid panel    Microalbumin / creatinine urine ratio    HEMOGLOBIN A1C W/ EAG ESTIMATION      Other Visit Diagnoses     Screening for colon cancer        Relevant Orders    Ambulatory referral for colonoscopy    Encounter for prostate cancer screening        Relevant Orders PSA, Total Screen            Subjective:      Patient ID: Theresa Hurst is a 55 y o  male  1  Dm-2- blood sugar now down to 139 and 126  Tolerating medicine well  No hypoglycemia episodes  No urinary tract infection or any skin infection  No abdominal pain nausea or vomiting  No numbness or tingling  No claudication pain  No increased fatigue  No chest pain or dyspnea  2  Dyslipidemia-no claudication pain  No chest pain  Watching diet lately  No significant weight changes  Advised to take atorvastatin  3  ED-he was given Cialis 5 mg by his urologist which is not helping  He wants to try higher dose  Tolerated well  Viagra 100 mg did not help much  No back pain  No incontinence  No lower extremity weakness  4  Hypertension  No headache dizziness or lightheadedness  No cough  No edema  No orthopnea  No palpitations  No syncope      The following portions of the patient's history were reviewed and updated as appropriate:   Past Medical History:  He has a past medical history of Aortic aneurysm ([facility] ), Decreased testosterone level, Diabetes mellitus ([facility] ), GERD (gastroesophageal reflux disease), High cholesterol, Hyperlipidemia, Hypertension, and Reduced libido ,  _______________________________________________________________________  Medical Problems:  does not have any pertinent problems on file ,  _______________________________________________________________________  Past Surgical History:   has a past surgical history that includes Colonoscopy (2015)  ,  _______________________________________________________________________  Family History:  family history includes Hypertension in his father and mother ,  _______________________________________________________________________  Social History:   reports that he has been smoking   He has never used smokeless tobacco  He reports that he does not drink alcohol and does not use drugs ,  _______________________________________________________________________  Allergies:  is allergic to penicillins, metformin, and clindamycin     _______________________________________________________________________  Current Outpatient Medications   Medication Sig Dispense Refill    Empagliflozin (Jardiance) 25 MG TABS Take 1 tablet (25 mg total) by mouth every morning 30 tablet 5    Glucose Blood (BLOOD GLUCOSE TEST STRIPS) STRP Check sugar bid 100 strip 4    lisinopril (ZESTRIL) 10 mg tablet Take 1 tablet (10 mg total) by mouth daily 90 tablet 1    omeprazole (PriLOSEC) 20 mg delayed release capsule Take 1 capsule (20 mg total) by mouth daily 90 capsule 2    pioglitazone (ACTOS) 15 mg tablet Take 1 tablet (15 mg total) by mouth daily 90 tablet 1    atorvastatin (LIPITOR) 10 mg tablet Take 1 tablet (10 mg total) by mouth daily (Patient not taking: Reported on 2/11/2021) 90 tablet 1    glimepiride (AMARYL) 4 mg tablet Take 1 tablet (4 mg total) by mouth daily with breakfast 30 tablet 5    meclizine (ANTIVERT) 12 5 MG tablet Take 1 tablet (12 5 mg total) by mouth every 8 (eight) hours as needed for dizziness (Patient not taking: Reported on 8/30/2021) 30 tablet 1    tadalafil (CIALIS) 20 MG tablet Take 1 tablet (20 mg total) by mouth daily as needed for erectile dysfunction 10 tablet 0     No current facility-administered medications for this visit      _______________________________________________________________________  Review of Systems   Constitutional: Negative for chills, fatigue and fever  HENT: Negative for congestion, nosebleeds and rhinorrhea  Eyes: Negative for discharge and visual disturbance  Respiratory: Negative for cough, chest tightness, shortness of breath and wheezing  Cardiovascular: Negative for chest pain, palpitations and leg swelling  Gastrointestinal: Negative for abdominal distention, abdominal pain, constipation, diarrhea and vomiting     Endocrine: Negative for polydipsia and polyuria  Genitourinary: Negative for difficulty urinating, frequency and hematuria  Musculoskeletal: Negative for arthralgias, back pain and myalgias  Skin: Negative for rash  Allergic/Immunologic: Negative for environmental allergies  Neurological: Negative for dizziness, syncope, weakness, light-headedness and headaches  Hematological: Negative  Psychiatric/Behavioral: Negative for agitation, confusion, decreased concentration, dysphoric mood and suicidal ideas  The patient is not nervous/anxious  All other systems reviewed and are negative  Objective:  Vitals:    03/01/22 0824   BP: 130/80   BP Location: Left arm   Patient Position: Sitting   Cuff Size: Large   Pulse: 68   Resp: 16   Temp: (!) 96 8 °F (36 °C)   TempSrc: Temporal   SpO2: 97%   Weight: 91 6 kg (202 lb)   Height: 5' 11" (1 803 m)     Body mass index is 28 17 kg/m²  Physical Exam  Vitals and nursing note reviewed  Constitutional:       General: He is not in acute distress  Appearance: Normal appearance  He is well-developed  HENT:      Head: Normocephalic and atraumatic  Eyes:      General:         Right eye: No discharge  Left eye: No discharge  Neck:      Thyroid: No thyromegaly  Cardiovascular:      Rate and Rhythm: Normal rate and regular rhythm  Pulses: no weak pulses          Dorsalis pedis pulses are 1+ on the right side and 1+ on the left side  Heart sounds: Normal heart sounds  No murmur heard  Pulmonary:      Effort: Pulmonary effort is normal       Breath sounds: Normal breath sounds  No wheezing or rhonchi  Abdominal:      Palpations: Abdomen is soft  Tenderness: There is no abdominal tenderness  Musculoskeletal:         General: No swelling or tenderness  Cervical back: Neck supple  Right lower leg: No edema  Left lower leg: No edema     Feet:      Right foot:      Skin integrity: No ulcer, skin breakdown, erythema, warmth, callus or dry skin  Left foot:      Skin integrity: No ulcer, skin breakdown, erythema, warmth, callus or dry skin  Lymphadenopathy:      Cervical: No cervical adenopathy  Skin:     General: Skin is warm  Capillary Refill: Capillary refill takes less than 2 seconds  Findings: No erythema  Neurological:      General: No focal deficit present  Mental Status: He is alert and oriented to person, place, and time  Psychiatric:         Mood and Affect: Mood normal          Behavior: Behavior normal          Thought Content:  Thought content normal

## 2022-05-11 ENCOUNTER — TELEPHONE (OUTPATIENT)
Dept: FAMILY MEDICINE CLINIC | Facility: CLINIC | Age: 57
End: 2022-05-11

## 2022-05-11 DIAGNOSIS — E11.69 ERECTILE DYSFUNCTION DUE TO DIABETES MELLITUS (HCC): ICD-10-CM

## 2022-05-11 DIAGNOSIS — N52.1 ERECTILE DYSFUNCTION DUE TO DIABETES MELLITUS (HCC): ICD-10-CM

## 2022-05-11 RX ORDER — TADALAFIL 20 MG/1
20 TABLET ORAL DAILY PRN
Qty: 10 TABLET | Refills: 0 | Status: SHIPPED | OUTPATIENT
Start: 2022-05-11 | End: 2022-05-11 | Stop reason: SDUPTHER

## 2022-05-11 RX ORDER — TADALAFIL 20 MG/1
20 TABLET ORAL DAILY PRN
Qty: 10 TABLET | Refills: 0 | Status: SHIPPED | OUTPATIENT
Start: 2022-05-11 | End: 2022-08-05

## 2022-05-25 ENCOUNTER — RA CDI HCC (OUTPATIENT)
Dept: OTHER | Facility: HOSPITAL | Age: 57
End: 2022-05-25

## 2022-05-25 NOTE — PROGRESS NOTES
[facility]  coding opportunities          Chart Reviewed number of suggestions sent to Provider: 2     Patients Insurance        Commercial Insurance: [employer]     [id number]  [id number]

## 2022-05-26 ENCOUNTER — APPOINTMENT (OUTPATIENT)
Dept: LAB | Facility: CLINIC | Age: 57
End: 2022-05-26
Payer: COMMERCIAL

## 2022-05-26 DIAGNOSIS — E11.65 TYPE 2 DIABETES MELLITUS WITH HYPERGLYCEMIA, WITHOUT LONG-TERM CURRENT USE OF INSULIN (HCC): ICD-10-CM

## 2022-05-26 DIAGNOSIS — Z12.5 ENCOUNTER FOR PROSTATE CANCER SCREENING: ICD-10-CM

## 2022-05-26 LAB
ALBUMIN SERPL BCP-MCNC: 4 G/DL (ref 3.5–5)
ALP SERPL-CCNC: 67 U/L (ref 34–104)
ALT SERPL W P-5'-P-CCNC: 12 U/L (ref 7–52)
ANION GAP SERPL CALCULATED.3IONS-SCNC: 9 MMOL/L (ref 4–13)
AST SERPL W P-5'-P-CCNC: 11 U/L (ref 13–39)
BASOPHILS # BLD AUTO: 0.08 THOUSANDS/ΜL (ref 0–0.1)
BASOPHILS NFR BLD AUTO: 1 % (ref 0–1)
BILIRUB SERPL-MCNC: 0.53 MG/DL (ref 0.2–1)
BUN SERPL-MCNC: 17 MG/DL (ref 5–25)
CALCIUM SERPL-MCNC: 9.2 MG/DL (ref 8.4–10.2)
CHLORIDE SERPL-SCNC: 101 MMOL/L (ref 96–108)
CHOLEST SERPL-MCNC: 183 MG/DL
CO2 SERPL-SCNC: 27 MMOL/L (ref 21–32)
CREAT SERPL-MCNC: 0.89 MG/DL (ref 0.6–1.3)
CREAT UR-MCNC: 65.1 MG/DL
EOSINOPHIL # BLD AUTO: 0.24 THOUSAND/ΜL (ref 0–0.61)
EOSINOPHIL NFR BLD AUTO: 4 % (ref 0–6)
ERYTHROCYTE [DISTWIDTH] IN BLOOD BY AUTOMATED COUNT: 13.9 % (ref 11.6–15.1)
EST. AVERAGE GLUCOSE BLD GHB EST-MCNC: 197 MG/DL
GFR SERPL CREATININE-BSD FRML MDRD: 96 ML/MIN/1.73SQ M
GLUCOSE P FAST SERPL-MCNC: 166 MG/DL (ref 65–99)
HBA1C MFR BLD: 8.5 %
HCT VFR BLD AUTO: 45.9 % (ref 36.5–49.3)
HDLC SERPL-MCNC: 45 MG/DL
HGB BLD-MCNC: 14.7 G/DL (ref 12–17)
IMM GRANULOCYTES # BLD AUTO: 0.02 THOUSAND/UL (ref 0–0.2)
IMM GRANULOCYTES NFR BLD AUTO: 0 % (ref 0–2)
LDLC SERPL CALC-MCNC: 104 MG/DL (ref 0–100)
LYMPHOCYTES # BLD AUTO: 2.27 THOUSANDS/ΜL (ref 0.6–4.47)
LYMPHOCYTES NFR BLD AUTO: 36 % (ref 14–44)
MCH RBC QN AUTO: 25.7 PG (ref 26.8–34.3)
MCHC RBC AUTO-ENTMCNC: 32 G/DL (ref 31.4–37.4)
MCV RBC AUTO: 80 FL (ref 82–98)
MICROALBUMIN UR-MCNC: <5 MG/L (ref 0–20)
MICROALBUMIN/CREAT 24H UR: <8 MG/G CREATININE (ref 0–30)
MONOCYTES # BLD AUTO: 0.59 THOUSAND/ΜL (ref 0.17–1.22)
MONOCYTES NFR BLD AUTO: 9 % (ref 4–12)
NEUTROPHILS # BLD AUTO: 3.12 THOUSANDS/ΜL (ref 1.85–7.62)
NEUTS SEG NFR BLD AUTO: 50 % (ref 43–75)
NONHDLC SERPL-MCNC: 138 MG/DL
NRBC BLD AUTO-RTO: 0 /100 WBCS
PLATELET # BLD AUTO: 281 THOUSANDS/UL (ref 149–390)
PMV BLD AUTO: 11.1 FL (ref 8.9–12.7)
POTASSIUM SERPL-SCNC: 4 MMOL/L (ref 3.5–5.3)
PROT SERPL-MCNC: 7.6 G/DL (ref 6.4–8.4)
PSA SERPL-MCNC: 1.4 NG/ML (ref 0–4)
RBC # BLD AUTO: 5.73 MILLION/UL (ref 3.88–5.62)
SODIUM SERPL-SCNC: 137 MMOL/L (ref 135–147)
TRIGL SERPL-MCNC: 169 MG/DL
TSH SERPL DL<=0.05 MIU/L-ACNC: 3.07 UIU/ML (ref 0.45–4.5)
WBC # BLD AUTO: 6.32 THOUSAND/UL (ref 4.31–10.16)

## 2022-05-26 PROCEDURE — 83036 HEMOGLOBIN GLYCOSYLATED A1C: CPT

## 2022-05-26 PROCEDURE — 82043 UR ALBUMIN QUANTITATIVE: CPT | Performed by: INTERNAL MEDICINE

## 2022-05-26 PROCEDURE — 82570 ASSAY OF URINE CREATININE: CPT | Performed by: INTERNAL MEDICINE

## 2022-05-26 PROCEDURE — 80053 COMPREHEN METABOLIC PANEL: CPT

## 2022-05-26 PROCEDURE — 80061 LIPID PANEL: CPT

## 2022-05-26 PROCEDURE — 36415 COLL VENOUS BLD VENIPUNCTURE: CPT

## 2022-05-26 PROCEDURE — 85025 COMPLETE CBC W/AUTO DIFF WBC: CPT

## 2022-05-26 PROCEDURE — G0103 PSA SCREENING: HCPCS

## 2022-05-26 PROCEDURE — 84443 ASSAY THYROID STIM HORMONE: CPT

## 2022-06-01 ENCOUNTER — OFFICE VISIT (OUTPATIENT)
Dept: FAMILY MEDICINE CLINIC | Facility: CLINIC | Age: 57
End: 2022-06-01
Payer: COMMERCIAL

## 2022-06-01 VITALS
BODY MASS INDEX: 28.64 KG/M2 | WEIGHT: 204.6 LBS | OXYGEN SATURATION: 97 % | RESPIRATION RATE: 16 BRPM | SYSTOLIC BLOOD PRESSURE: 140 MMHG | DIASTOLIC BLOOD PRESSURE: 90 MMHG | TEMPERATURE: 96.8 F | HEIGHT: 71 IN | HEART RATE: 67 BPM

## 2022-06-01 DIAGNOSIS — R20.0 NUMBNESS OF TOES: ICD-10-CM

## 2022-06-01 DIAGNOSIS — E11.69 DIABETES MELLITUS TYPE 2 IN OBESE (HCC): Primary | ICD-10-CM

## 2022-06-01 DIAGNOSIS — Z12.11 SCREENING FOR COLON CANCER: ICD-10-CM

## 2022-06-01 DIAGNOSIS — E66.9 DIABETES MELLITUS TYPE 2 IN OBESE (HCC): Primary | ICD-10-CM

## 2022-06-01 DIAGNOSIS — E78.5 DYSLIPIDEMIA: ICD-10-CM

## 2022-06-01 DIAGNOSIS — I10 ESSENTIAL HYPERTENSION: ICD-10-CM

## 2022-06-01 PROCEDURE — 3008F BODY MASS INDEX DOCD: CPT | Performed by: INTERNAL MEDICINE

## 2022-06-01 PROCEDURE — 3080F DIAST BP >= 90 MM HG: CPT | Performed by: INTERNAL MEDICINE

## 2022-06-01 PROCEDURE — 4004F PT TOBACCO SCREEN RCVD TLK: CPT | Performed by: INTERNAL MEDICINE

## 2022-06-01 PROCEDURE — 99214 OFFICE O/P EST MOD 30 MIN: CPT | Performed by: INTERNAL MEDICINE

## 2022-06-01 PROCEDURE — 3725F SCREEN DEPRESSION PERFORMED: CPT | Performed by: INTERNAL MEDICINE

## 2022-06-01 PROCEDURE — 3077F SYST BP >= 140 MM HG: CPT | Performed by: INTERNAL MEDICINE

## 2022-06-01 RX ORDER — GABAPENTIN 100 MG/1
200 CAPSULE ORAL 2 TIMES DAILY
Qty: 120 CAPSULE | Refills: 5 | Status: SHIPPED | OUTPATIENT
Start: 2022-06-01

## 2022-06-01 NOTE — PROGRESS NOTES
Depression Screening and Follow-up Plan: Patient was screened for depression during today's encounter  They screened negative with a PHQ-2 score of 0  Tobacco Cessation Counseling: Tobacco cessation counseling was provided  The patient is sincerely urged to quit consumption of tobacco  He is not ready to quit tobacco    Assessment/Plan:         Problem List Items Addressed This Visit        Endocrine    Diabetes mellitus type 2 in obese [facility]) - Primary       Lab Results   Component Value Date    HGBA1C 8 5 (H) 05/26/2022   a1c little better  Had 8-9 chocolate yesterday  Not able to control food craving  Counseling given again  Declined nutritionist services  Declined for any new meds/injections  Wants to take pills only  Labs reviewed and interpreted  Relevant Orders    HEMOGLOBIN A1C W/ EAG ESTIMATION    Basic metabolic panel       Cardiovascular and Mediastinum    Essential hypertension     Repeat bp better  monitor              Other    Dyslipidemia     ldl much better 103 from 170  Declined to take any med  Understands risk vs benefits  Numbness of toes    Relevant Medications    gabapentin (Neurontin) 100 mg capsule      Other Visit Diagnoses     Screening for colon cancer        Relevant Orders    Ambulatory referral for colonoscopy            Subjective:      Patient ID: Jaylyn Menjivar is a 55 y o  male  1  Dm-2-A1c came down some  He still eating chocolate candy is  Tolerating medications well without any hypoglycemia symptoms or signs  No claudication pain  No chest pain or dyspnea  No headache or lightheadedness  No change in the vision  2 htn-no headache dizziness or lightheadedness  No palpitations  No syncope  No edema  No orthopnea  3  Dyslipidemia-LDL down to 103 from 170  No claudication pain  No chest pain  No significant weight changes  He is not compliant with diet  He refused to go on any statins or other pills      4  Numbness-often on he feels numbness in his toes mostly on the left foot  Last only for few minutes  No open skin areas  No swelling  Last night he felt some sharp pain in his lower back which was coming and going lasting for few seconds only  Was nonspecific  No rash in the same area  Pain has gone now  No increased lower extremity weakness or incontinence    Back Pain  Associated symptoms include numbness  Pertinent negatives include no abdominal pain, chest pain, fever, headaches or weakness  The following portions of the patient's history were reviewed and updated as appropriate:   Past Medical History:  He has a past medical history of Aortic aneurysm ([facility] ), Decreased testosterone level, Diabetes mellitus ([facility] ), GERD (gastroesophageal reflux disease), High cholesterol, Hyperlipidemia, Hypertension, and Reduced libido ,  _______________________________________________________________________  Medical Problems:  does not have any pertinent problems on file ,  _______________________________________________________________________  Past Surgical History:   has a past surgical history that includes Colonoscopy (2015)  ,  _______________________________________________________________________  Family History:  family history includes Hypertension in his father and mother ,  _______________________________________________________________________  Social History:   reports that he has been smoking  He has never used smokeless tobacco  He reports that he does not drink alcohol and does not use drugs  ,  _______________________________________________________________________  Allergies:  is allergic to penicillins, metformin, and clindamycin     _______________________________________________________________________  Current Outpatient Medications   Medication Sig Dispense Refill    Empagliflozin (Jardiance) 25 MG TABS Take 1 tablet (25 mg total) by mouth every morning 30 tablet 5    gabapentin (Neurontin) 100 mg capsule Take 2 capsules (200 mg total) by mouth 2 (two) times a day 120 capsule 5    glimepiride (AMARYL) 4 mg tablet Take 1 tablet (4 mg total) by mouth daily with breakfast 30 tablet 5    Glucose Blood (BLOOD GLUCOSE TEST STRIPS) STRP Check sugar bid 100 strip 4    lisinopril (ZESTRIL) 10 mg tablet Take 1 tablet (10 mg total) by mouth daily 90 tablet 1    omeprazole (PriLOSEC) 20 mg delayed release capsule Take 1 capsule (20 mg total) by mouth daily 90 capsule 2    pioglitazone (ACTOS) 15 mg tablet Take 1 tablet (15 mg total) by mouth daily 90 tablet 1    tadalafil (CIALIS) 20 MG tablet Take 1 tablet (20 mg total) by mouth as needed in the morning for erectile dysfunction  10 tablet 0    atorvastatin (LIPITOR) 10 mg tablet Take 1 tablet (10 mg total) by mouth daily (Patient not taking: Reported on 2/11/2021) 90 tablet 1    meclizine (ANTIVERT) 12 5 MG tablet Take 1 tablet (12 5 mg total) by mouth every 8 (eight) hours as needed for dizziness (Patient not taking: Reported on 8/30/2021) 30 tablet 1     No current facility-administered medications for this visit      _______________________________________________________________________  Review of Systems   Constitutional: Negative for chills, fatigue and fever  HENT: Negative for congestion, nosebleeds and rhinorrhea  Eyes: Negative for discharge and visual disturbance  Respiratory: Negative for cough, chest tightness, shortness of breath and wheezing  Cardiovascular: Negative for chest pain  Gastrointestinal: Negative for abdominal distention, abdominal pain, constipation, diarrhea and vomiting  Endocrine: Negative for polydipsia and polyuria  Genitourinary: Negative for difficulty urinating, frequency and hematuria  Musculoskeletal: Positive for back pain  Negative for arthralgias and myalgias  Skin: Negative for rash  Allergic/Immunologic: Negative for environmental allergies  Neurological: Positive for numbness   Negative for dizziness, syncope, weakness, light-headedness and headaches  Hematological: Negative  Psychiatric/Behavioral: Negative for agitation, confusion, decreased concentration, dysphoric mood and suicidal ideas  The patient is not nervous/anxious  All other systems reviewed and are negative  Objective:  Vitals:    06/01/22 0809   BP: 140/90   BP Location: Left arm   Patient Position: Sitting   Cuff Size: Large   Pulse: 67   Resp: 16   Temp: (!) 96 8 °F (36 °C)   TempSrc: Temporal   SpO2: 97%   Weight: 92 8 kg (204 lb 9 6 oz)   Height: 5' 11" (1 803 m)     Body mass index is 28 54 kg/m²  Physical Exam  Vitals and nursing note reviewed  Constitutional:       General: He is not in acute distress  Appearance: Normal appearance  He is well-developed  HENT:      Head: Normocephalic and atraumatic  Eyes:      General:         Right eye: No discharge  Left eye: No discharge  Neck:      Thyroid: No thyromegaly  Cardiovascular:      Rate and Rhythm: Normal rate and regular rhythm  Pulses: Normal pulses  Heart sounds: Normal heart sounds  No murmur heard  Pulmonary:      Effort: Pulmonary effort is normal       Breath sounds: Normal breath sounds  No wheezing or rhonchi  Abdominal:      Tenderness: There is no abdominal tenderness  Musculoskeletal:         General: No swelling or tenderness  Cervical back: Neck supple  Right lower leg: No edema  Left lower leg: No edema  Lymphadenopathy:      Cervical: No cervical adenopathy  Skin:     General: Skin is warm  Capillary Refill: Capillary refill takes less than 2 seconds  Findings: No erythema  Neurological:      General: No focal deficit present  Mental Status: He is alert and oriented to person, place, and time  Psychiatric:         Mood and Affect: Mood normal          Behavior: Behavior normal          Thought Content:  Thought content normal

## 2022-06-01 NOTE — PATIENT INSTRUCTIONS

## 2022-06-01 NOTE — ASSESSMENT & PLAN NOTE
Lab Results   Component Value Date    HGBA1C 8 5 (H) 05/26/2022   a1c little better  Had 8-9 chocolate yesterday  Not able to control food craving  Counseling given again  Declined nutritionist services  Declined for any new meds/injections  Wants to take pills only  Labs reviewed and interpreted 
Repeat bp better   monitor
ldl much better 103 from 170  Declined to take any med  Understands risk vs benefits 
100% of the time/able to follow multistep instructions

## 2022-06-17 ENCOUNTER — TELEPHONE (OUTPATIENT)
Dept: OTHER | Facility: OTHER | Age: 57
End: 2022-06-17

## 2022-06-17 NOTE — TELEPHONE ENCOUNTER
Pt returning missed call- believes call was pertaining to colonoscopy referral  Please call pt back, okay to leave vm

## 2022-06-17 NOTE — TELEPHONE ENCOUNTER
Attempted to contact patient for the first time to schedule colonoscopy  Left phone number of [phone] to call back to schedule

## 2022-06-20 ENCOUNTER — TELEPHONE (OUTPATIENT)
Dept: GASTROENTEROLOGY | Facility: CLINIC | Age: 57
End: 2022-06-20

## 2022-06-20 NOTE — TELEPHONE ENCOUNTER
I lmom for pt to please call back to schedule a colonoscopy screening  Will call pt again in one week if do not hear back from him

## 2022-06-20 NOTE — TELEPHONE ENCOUNTER
One of pt's referrals was closed and the other was canceled  Will wait for pt's call back at this time

## 2022-06-24 ENCOUNTER — PREP FOR PROCEDURE (OUTPATIENT)
Dept: GASTROENTEROLOGY | Facility: CLINIC | Age: 57
End: 2022-06-24

## 2022-06-24 DIAGNOSIS — Z12.11 SCREENING FOR COLON CANCER: Primary | ICD-10-CM

## 2022-06-24 NOTE — TELEPHONE ENCOUNTER
Scheduled date of colonoscopy (as of today):7/22/22    Physician performing colonoscopy:Kei    Location of colonoscopy: [facility]    Bowel prep reviewed with patient:Baltazar/Esteban    Instructions reviewed with patient by:MEDINA    Clearances: NA

## 2022-06-24 NOTE — TELEPHONE ENCOUNTER
[facility] Assessment    Name: Nasra Jefferson  YOB: 1965  Last Height: 5' 11" (1 803 m)  Last weight: 92 8 kg (204 lb 9 6 oz)  BMI: 28 54 kg/m²  Procedure: colonoscopy  Diagnosis Screening  Date of procedure: 7/22/22  Prep: Baltazar/Dul  Responsible : son/[place]  Phone#: [phone]  Name completing form: Moy Granger  Date form completed: 06/24/22      If the patient answers yes to any of these questions, schedule in a hospital  Are you pregnant: No  Do you rely on a wheelchair for mobility: No  Have you been diagnosed with End Stage Renal Disease (ESRD): No  Do you need oxygen during the day: No  Have you had a heart attack or stroke within the past three months: No  Have you had a seizure within the past three months: No  Have you ever been informed by anesthesia that you have a difficult airway: No  Additional Questions  Have you had any cardiac testing or are under the care of a Cardiologist (see cardiac list): No  Cardiac list:   Do you have an implanted cardiac defibrillator: No (Comment:  This patient should be scheduled in the hospital)    Have any bleeding problems, such as anemia or hemophilia (If patient has H&H result below 8, schedule in hospital   H&H must be within 30 days of procedure): No    Had an organ transplant within the past 3 months: No    Do you have any present infections: No  Do you get short of breath when walking a few blocks: No  Have you been diagnosed with diabetes: Yes  Comments (provide cardiac provider information if applicable): Type 2 / oral

## 2022-07-08 ENCOUNTER — TELEPHONE (OUTPATIENT)
Dept: GASTROENTEROLOGY | Facility: CLINIC | Age: 57
End: 2022-07-08

## 2022-07-08 NOTE — TELEPHONE ENCOUNTER
I lmom asking pt to please call back to let us know if he has had any recent imaging since 2016 for aortic aneurysm and that if not he may need to be rescheduled to hospital   Will caitlin pt again in one week if do not hear back from him

## 2022-07-08 NOTE — TELEPHONE ENCOUNTER
----- Message from Donna Roth sent at 7/8/2022 11:48 AM EDT -----  Regarding: Needs cardiac clearance  As per Anesthesia  patient needs clearance and wants to know if imaging has been done for the 4 7 cm aortic aneurysm after 2016 if not patient should be done at the hospital

## 2022-07-13 NOTE — TELEPHONE ENCOUNTER
I lmom for pt to please call back to let us know if he has had any recent imaging as Anesthesia reviewed his chart and found hx of aortic aneurysm in 2016 and he may need to be rescheduled to hospital   Will call pt again in a few days if do not hear back from him

## 2022-07-18 ENCOUNTER — TELEPHONE (OUTPATIENT)
Dept: GASTROENTEROLOGY | Facility: AMBULARY SURGERY CENTER | Age: 57
End: 2022-07-18

## 2022-07-18 NOTE — TELEPHONE ENCOUNTER
Patients GI provider:  Dr Lianne Sosa     Number to return call: [phone]     Reason for call: Pt's daughter named Pb Jacques requesting to speak with someone regarding dad's prep instructions     Scheduled procedure/appointment date if applicable: Apt/procedure 7/22/22

## 2022-07-18 NOTE — TELEPHONE ENCOUNTER
No return calls from pt  I faxed urgent clearance [phone] to Dr Shaheen Sierra asking if pt is cleared for procedure due to hx of aortic aneurysm and asked them to please fax any testing on this as looking for something after 2016  Will call their office tomorrow to make sure that clearance request was received [phone]

## 2022-07-19 NOTE — TELEPHONE ENCOUNTER
I returned daughter's call asking her to please call me back regarding pt as I have been leaving messages for him regarding cardiac clearance and to see if he has had any recent testing on aortic aneursym since 2016 as anesthesia is saying he may need to be rescheduled to hospital   Will call her again tomorrow if do not hear back from  Please also see other telephone encounter regarding this in pt's chart    Thank you!!

## 2022-07-19 NOTE — TELEPHONE ENCOUNTER
I lmom for Dr John Qureshi office to please call me back to let me know if urgent clearance request and request for recent testing of aneurysm was received  Will call again tomorrow if do not hear back from them

## 2022-07-20 ENCOUNTER — TELEPHONE (OUTPATIENT)
Dept: FAMILY MEDICINE CLINIC | Facility: CLINIC | Age: 57
End: 2022-07-20

## 2022-07-20 NOTE — TELEPHONE ENCOUNTER
I LMOM AGAIN FOR DR Roberts Overall OFFICE TO PLEASE CALL ME BACK ASAP AS I HAVE BEEN LEAVING MESSAGES AND ALSO FAXED OVER URGENT CLEARANCE REQUEST FOR PT'S PROCEDURE SCHEDULED ON Friday 7/22/22  WILL CALL THEIR OFFICE AGAIN TOMORROW IF DO NOT HEAR BACK FROM THEM  ALSO STILL WAITING FOR A CALL BACK FROM PT AS HAVE LEFT NUMEROUS MESSAGES FOR HIM AS WELL

## 2022-07-20 NOTE — TELEPHONE ENCOUNTER
I DID RETURN CALL AND LMOM FOR PT TO PLEASE CALL ME BACK ASAP REGARDING HIS PROCEDURE ON 7/22/22 AND WHEN CALLS BACK, PLEASE HAVE CALL CENTER CONNECT HIM TO ME!

## 2022-07-20 NOTE — TELEPHONE ENCOUNTER
Rui Rodríguez from [facility] gastro called she said she has called several times and faxed over a clearance for this patient    He is scheduled for this Friday and they need it asap

## 2022-07-20 NOTE — TELEPHONE ENCOUNTER
Clearance received from Dr Erasmo Otero  Pt is cleared for procedure, however, no mention of other testing regarding aortic aneurysm  I have left several messages for pt regarding this and also trying to return his or daughter's call regarding prep instructions

## 2022-07-20 NOTE — TELEPHONE ENCOUNTER
PLEASE SEE OTHER TELEPHONE MESSAGE!!!!  I DID RETURN CALL AND LMOM FOR PT TO PLEASE CALL ME BACK ASAP REGARDING HIS PROCEDURE ON 7/22/22 AND WHEN CALLS BACK, PLEASE HAVE CALL CENTER CONNECT HIM TO ME!

## 2022-07-22 ENCOUNTER — ANESTHESIA (OUTPATIENT)
Dept: GASTROENTEROLOGY | Facility: AMBULATORY SURGERY CENTER | Age: 57
End: 2022-07-22

## 2022-07-22 ENCOUNTER — ANESTHESIA EVENT (OUTPATIENT)
Dept: GASTROENTEROLOGY | Facility: AMBULATORY SURGERY CENTER | Age: 57
End: 2022-07-22

## 2022-07-22 ENCOUNTER — HOSPITAL ENCOUNTER (OUTPATIENT)
Dept: GASTROENTEROLOGY | Facility: AMBULATORY SURGERY CENTER | Age: 57
Discharge: HOME | End: 2022-07-22
Payer: COMMERCIAL

## 2022-07-22 VITALS
RESPIRATION RATE: 18 BRPM | HEIGHT: 70 IN | OXYGEN SATURATION: 100 % | TEMPERATURE: 97.8 F | HEART RATE: 69 BPM | SYSTOLIC BLOOD PRESSURE: 124 MMHG | WEIGHT: 200 LBS | BODY MASS INDEX: 28.63 KG/M2 | DIASTOLIC BLOOD PRESSURE: 72 MMHG

## 2022-07-22 DIAGNOSIS — Z12.11 SCREENING FOR COLON CANCER: ICD-10-CM

## 2022-07-22 PROCEDURE — 45380 COLONOSCOPY AND BIOPSY: CPT | Performed by: INTERNAL MEDICINE

## 2022-07-22 RX ORDER — PROPOFOL 10 MG/ML
INJECTION, EMULSION INTRAVENOUS AS NEEDED
Status: DISCONTINUED | OUTPATIENT
Start: 2022-07-22 | End: 2022-07-22

## 2022-07-22 RX ORDER — SODIUM CHLORIDE 9 MG/ML
20 INJECTION, SOLUTION INTRAVENOUS CONTINUOUS
Status: DISCONTINUED | OUTPATIENT
Start: 2022-07-22 | End: 2022-07-26 | Stop reason: HOSPADM

## 2022-07-22 RX ORDER — LIDOCAINE HYDROCHLORIDE 10 MG/ML
INJECTION, SOLUTION EPIDURAL; INFILTRATION; INTRACAUDAL; PERINEURAL AS NEEDED
Status: DISCONTINUED | OUTPATIENT
Start: 2022-07-22 | End: 2022-07-22

## 2022-07-22 RX ORDER — SODIUM CHLORIDE, SODIUM LACTATE, POTASSIUM CHLORIDE, CALCIUM CHLORIDE 600; 310; 30; 20 MG/100ML; MG/100ML; MG/100ML; MG/100ML
INJECTION, SOLUTION INTRAVENOUS CONTINUOUS PRN
Status: DISCONTINUED | OUTPATIENT
Start: 2022-07-22 | End: 2022-07-22

## 2022-07-22 RX ADMIN — PROPOFOL 40 MG: 10 INJECTION, EMULSION INTRAVENOUS at 10:09

## 2022-07-22 RX ADMIN — SODIUM CHLORIDE, SODIUM LACTATE, POTASSIUM CHLORIDE, CALCIUM CHLORIDE: 600; 310; 30; 20 INJECTION, SOLUTION INTRAVENOUS at 09:47

## 2022-07-22 RX ADMIN — PROPOFOL 100 MG: 10 INJECTION, EMULSION INTRAVENOUS at 10:01

## 2022-07-22 RX ADMIN — LIDOCAINE HYDROCHLORIDE 50 MG: 10 INJECTION, SOLUTION EPIDURAL; INFILTRATION; INTRACAUDAL; PERINEURAL at 10:01

## 2022-07-22 RX ADMIN — PROPOFOL 20 MG: 10 INJECTION, EMULSION INTRAVENOUS at 10:12

## 2022-07-22 RX ADMIN — PROPOFOL 50 MG: 10 INJECTION, EMULSION INTRAVENOUS at 10:03

## 2022-07-22 RX ADMIN — PROPOFOL 50 MG: 10 INJECTION, EMULSION INTRAVENOUS at 10:06

## 2022-07-22 NOTE — H&P
History and Physical -  [facility]  Jh Green 55 y o  male MRN: [id number]                  HPI: Jh Green is a 55y o  year old male who presents for screening colonoscopy      REVIEW OF SYSTEMS: Per the HPI, and otherwise unremarkable  Historical Information   Past Medical History:   Diagnosis Date    [facility]     Decreased testosterone level     Diabetes mellitus ([facility] )     feels well today regarding blood sugar 7/22/22    GERD (gastroesophageal reflux disease)     High cholesterol     Hyperlipidemia     Hypertension     Reduced libido      Past Surgical History:   Procedure Laterality Date    COLONOSCOPY  2015     Social History   Social History     Substance and Sexual Activity   Alcohol Use Yes    Comment: occasional beer or wine     Social History     Substance and Sexual Activity   Drug Use No     Social History     Tobacco Use   Smoking Status Former Smoker    Types: Cigarettes   Smokeless Tobacco Never Used   Tobacco Comment    quit cigarettes- 2015     Family History   Problem Relation Age of Onset    Hypertension Mother     Hypertension Father        Meds/Allergies     (Not in a hospital admission)      Allergies   Allergen Reactions    Penicillins Swelling    Metformin Diarrhea    Clindamycin Hives       Objective     /82   Pulse 68   Temp 97 8 °F (36 6 °C) (Skin)   Resp 18   Ht 5' 10" (1 778 m)   Wt 90 7 kg (200 lb)   SpO2 99%   BMI 28 70 kg/m²       PHYSICAL EXAM    Gen: NAD  CV: RRR  CHEST: Clear  ABD: soft, NT/ND  EXT: no edema      ASSESSMENT/PLAN:  This is a 55y o  year old male here for colonoscopy, and he is stable and optimized for his procedure

## 2022-07-22 NOTE — ANESTHESIA POSTPROCEDURE EVALUATION
Post-Op Assessment Note    CV Status:  Stable  Pain Score: 0    Pain management: adequate     Mental Status:  Alert and awake   Hydration Status:  Euvolemic   PONV Controlled:  Controlled   Airway Patency:  Patent      Post Op Vitals Reviewed: Yes      Staff: CRNA         No complications documented      /72 (07/22/22 1019)    Temp      Pulse 70 (07/22/22 1019)   Resp 16 (07/22/22 1019)    SpO2 95 % (07/22/22 1019)

## 2022-07-22 NOTE — ANESTHESIA PREPROCEDURE EVALUATION
Procedure:  COLONOSCOPY    Relevant Problems   CARDIO   (+) Essential hypertension   (+) Thoracic aortic aneurysm without rupture (HCC)      ENDO   (+) Diabetes mellitus type 2 in obese (HCC)   (+) Type 2 diabetes mellitus with hyperglycemia (HCC)      GI/HEPATIC   (+) Gastroesophageal reflux disease without esophagitis      Other   (+) Dyslipidemia      4 7cm thoracic aortic aneurysm - last evaluated in 2016 which showed that it was unchanged from 2014 evaluation and scan  Patinet was recommended to follow-up in 2 years but did not  Does not endorse any symptoms such as chest pain, dizziness, loss of consciousness, etc      Physical Exam    Airway    Mallampati score: II  TM Distance: >3 FB  Neck ROM: full     Dental   Comment: None loose,     Cardiovascular      Pulmonary      Other Findings        Anesthesia Plan  ASA Score- 2     Anesthesia Type- IV sedation with anesthesia with ASA Monitors  Additional Monitors:   Airway Plan:     Comment: 07:00 - last of clears  Plan Factors-Exercise tolerance (METS): >4 METS  Chart reviewed  Patient summary reviewed  Patient is a current smoker  Patient instructed to abstain from smoking on day of procedure  Patient did not smoke on day of surgery  Induction- intravenous  Postoperative Plan-     Informed Consent- Anesthetic plan and risks discussed with patient  I personally reviewed this patient with the CRNA  Discussed and agreed on the Anesthesia Plan with the JUANA Edwards

## 2022-08-04 DIAGNOSIS — E11.69 ERECTILE DYSFUNCTION DUE TO DIABETES MELLITUS (HCC): ICD-10-CM

## 2022-08-04 DIAGNOSIS — N52.1 ERECTILE DYSFUNCTION DUE TO DIABETES MELLITUS (HCC): ICD-10-CM

## 2022-08-05 DIAGNOSIS — N52.1 ERECTILE DYSFUNCTION DUE TO DIABETES MELLITUS (HCC): ICD-10-CM

## 2022-08-05 DIAGNOSIS — E11.69 ERECTILE DYSFUNCTION DUE TO DIABETES MELLITUS (HCC): ICD-10-CM

## 2022-08-05 RX ORDER — TADALAFIL 20 MG/1
TABLET ORAL
Qty: 13 TABLET | Refills: 0 | Status: SHIPPED | OUTPATIENT
Start: 2022-08-05 | End: 2022-09-01 | Stop reason: SDUPTHER

## 2022-08-05 RX ORDER — TADALAFIL 20 MG/1
TABLET ORAL
Qty: 10 TABLET | Refills: 0 | Status: SHIPPED | OUTPATIENT
Start: 2022-08-05 | End: 2022-08-05

## 2022-09-01 ENCOUNTER — APPOINTMENT (OUTPATIENT)
Dept: LAB | Facility: CLINIC | Age: 57
End: 2022-09-01
Payer: COMMERCIAL

## 2022-09-01 ENCOUNTER — OFFICE VISIT (OUTPATIENT)
Dept: FAMILY MEDICINE CLINIC | Facility: CLINIC | Age: 57
End: 2022-09-01
Payer: COMMERCIAL

## 2022-09-01 VITALS
HEART RATE: 70 BPM | WEIGHT: 200.4 LBS | SYSTOLIC BLOOD PRESSURE: 116 MMHG | RESPIRATION RATE: 16 BRPM | DIASTOLIC BLOOD PRESSURE: 70 MMHG | TEMPERATURE: 97.8 F | OXYGEN SATURATION: 98 % | HEIGHT: 70 IN | BODY MASS INDEX: 28.69 KG/M2

## 2022-09-01 DIAGNOSIS — R20.0 NUMBNESS OF TOES: ICD-10-CM

## 2022-09-01 DIAGNOSIS — N50.9 DISEASE OF SEMINAL VESICLE: ICD-10-CM

## 2022-09-01 DIAGNOSIS — E66.9 DIABETES MELLITUS TYPE 2 IN OBESE (HCC): Primary | ICD-10-CM

## 2022-09-01 DIAGNOSIS — E11.69 DIABETES MELLITUS TYPE 2 IN OBESE (HCC): ICD-10-CM

## 2022-09-01 DIAGNOSIS — M79.672 FOOT PAIN, LEFT: ICD-10-CM

## 2022-09-01 DIAGNOSIS — E66.9 DIABETES MELLITUS TYPE 2 IN OBESE (HCC): ICD-10-CM

## 2022-09-01 DIAGNOSIS — K21.9 GASTROESOPHAGEAL REFLUX DISEASE WITHOUT ESOPHAGITIS: ICD-10-CM

## 2022-09-01 DIAGNOSIS — I10 ESSENTIAL HYPERTENSION: ICD-10-CM

## 2022-09-01 DIAGNOSIS — E11.69 DIABETES MELLITUS TYPE 2 IN OBESE (HCC): Primary | ICD-10-CM

## 2022-09-01 DIAGNOSIS — E78.5 DYSLIPIDEMIA: ICD-10-CM

## 2022-09-01 DIAGNOSIS — E11.69 ERECTILE DYSFUNCTION DUE TO DIABETES MELLITUS (HCC): ICD-10-CM

## 2022-09-01 DIAGNOSIS — N52.1 ERECTILE DYSFUNCTION DUE TO DIABETES MELLITUS (HCC): ICD-10-CM

## 2022-09-01 DIAGNOSIS — G62.9 NEUROPATHY: ICD-10-CM

## 2022-09-01 LAB
ANION GAP SERPL CALCULATED.3IONS-SCNC: 8 MMOL/L (ref 4–13)
BUN SERPL-MCNC: 16 MG/DL (ref 5–25)
CALCIUM SERPL-MCNC: 9.5 MG/DL (ref 8.4–10.2)
CHLORIDE SERPL-SCNC: 102 MMOL/L (ref 96–108)
CO2 SERPL-SCNC: 29 MMOL/L (ref 21–32)
CREAT SERPL-MCNC: 0.79 MG/DL (ref 0.6–1.3)
EST. AVERAGE GLUCOSE BLD GHB EST-MCNC: 243 MG/DL
GFR SERPL CREATININE-BSD FRML MDRD: 100 ML/MIN/1.73SQ M
GLUCOSE SERPL-MCNC: 167 MG/DL (ref 65–140)
HBA1C MFR BLD: 10.1 %
POTASSIUM SERPL-SCNC: 4.1 MMOL/L (ref 3.5–5.3)
SODIUM SERPL-SCNC: 139 MMOL/L (ref 135–147)
T3FREE SERPL-MCNC: 3.33 PG/ML (ref 2.3–4.2)

## 2022-09-01 PROCEDURE — 36415 COLL VENOUS BLD VENIPUNCTURE: CPT

## 2022-09-01 PROCEDURE — 83036 HEMOGLOBIN GLYCOSYLATED A1C: CPT

## 2022-09-01 PROCEDURE — 4010F ACE/ARB THERAPY RXD/TAKEN: CPT | Performed by: PODIATRIST

## 2022-09-01 PROCEDURE — 80048 BASIC METABOLIC PNL TOTAL CA: CPT

## 2022-09-01 PROCEDURE — 84481 FREE ASSAY (FT-3): CPT

## 2022-09-01 PROCEDURE — 99214 OFFICE O/P EST MOD 30 MIN: CPT | Performed by: INTERNAL MEDICINE

## 2022-09-01 PROCEDURE — 3046F HEMOGLOBIN A1C LEVEL >9.0%: CPT | Performed by: PODIATRIST

## 2022-09-01 RX ORDER — PIOGLITAZONEHYDROCHLORIDE 15 MG/1
15 TABLET ORAL DAILY
Qty: 90 TABLET | Refills: 3 | Status: SHIPPED | OUTPATIENT
Start: 2022-09-01 | End: 2022-11-30

## 2022-09-01 RX ORDER — GABAPENTIN 400 MG/1
400 CAPSULE ORAL 2 TIMES DAILY
Qty: 180 CAPSULE | Refills: 2 | Status: SHIPPED | OUTPATIENT
Start: 2022-09-01

## 2022-09-01 RX ORDER — GLUCOSAMINE HCL/CHONDROITIN SU 500-400 MG
CAPSULE ORAL
Qty: 100 STRIP | Refills: 4 | Status: SHIPPED | OUTPATIENT
Start: 2022-09-01

## 2022-09-01 RX ORDER — GABAPENTIN 100 MG/1
200 CAPSULE ORAL 2 TIMES DAILY
Qty: 360 CAPSULE | Refills: 3 | Status: SHIPPED | OUTPATIENT
Start: 2022-09-01 | End: 2022-09-01

## 2022-09-01 RX ORDER — OMEPRAZOLE 20 MG/1
20 CAPSULE, DELAYED RELEASE ORAL DAILY
Qty: 90 CAPSULE | Refills: 3 | Status: SHIPPED | OUTPATIENT
Start: 2022-09-01 | End: 2022-11-30

## 2022-09-01 RX ORDER — TADALAFIL 20 MG/1
20 TABLET ORAL DAILY PRN
Qty: 13 TABLET | Refills: 3 | Status: SHIPPED | OUTPATIENT
Start: 2022-09-01 | End: 2022-11-30

## 2022-09-01 RX ORDER — GLIMEPIRIDE 4 MG/1
4 TABLET ORAL
Qty: 90 TABLET | Refills: 3 | Status: SHIPPED | OUTPATIENT
Start: 2022-09-01 | End: 2022-11-30

## 2022-09-01 RX ORDER — LISINOPRIL 10 MG/1
10 TABLET ORAL DAILY
Qty: 90 TABLET | Refills: 3 | Status: SHIPPED | OUTPATIENT
Start: 2022-09-01 | End: 2022-11-30

## 2022-09-01 NOTE — PROGRESS NOTES
Patient's shoes and socks removed  Right Foot/Ankle   Right Foot Inspection  Skin Exam: skin normal and skin intact  No dry skin, no warmth, no callus, no erythema, no maceration, no abnormal color, no pre-ulcer, no ulcer and no callus  Toe Exam: ROM and strength within normal limits  Sensory   Monofilament testing: intact    Vascular  Capillary refills: < 3 seconds  The right DP pulse is 1+  Left Foot/Ankle  Left Foot Inspection  Skin Exam: skin normal and skin intact  No dry skin, no warmth, no erythema, no maceration, normal color, no pre-ulcer, no ulcer and no callus  Toe Exam: ROM and strength within normal limits  Sensory   Monofilament testing: intact    Vascular  Capillary refills: < 3 seconds  The left DP pulse is 1+  Assign Risk Category  No deformity present  No loss of protective sensation  No weak pulses  Risk: 0   BMI Counseling: Body mass index is 28 75 kg/m²  The BMI is above normal  Nutrition recommendations include decreasing portion sizes, decreasing fast food intake, consuming healthier snacks, limiting drinks that contain sugar, moderation in carbohydrate intake and reducing intake of cholesterol  Exercise recommendations include exercising 3-5 times per week and strength training exercises  Rationale for BMI follow-up plan is due to patient being overweight or obese       Assessment/Plan:         Problem List Items Addressed This Visit        Digestive    Gastroesophageal reflux disease without esophagitis    Relevant Medications    omeprazole (PriLOSEC) 20 mg delayed release capsule       Endocrine    Diabetes mellitus type 2 in obese (HCC)    Relevant Medications    Empagliflozin (Jardiance) 25 MG TABS    glimepiride (AMARYL) 4 mg tablet    pioglitazone (ACTOS) 15 mg tablet    Glucose Blood (BLOOD GLUCOSE TEST STRIPS) STRP    Erectile dysfunction due to diabetes mellitus (HCC)    Relevant Medications    Empagliflozin (Jardiance) 25 MG TABS    glimepiride (AMARYL) 4 mg tablet    pioglitazone (ACTOS) 15 mg tablet    tadalafil (CIALIS) 20 MG tablet       Cardiovascular and Mediastinum    Essential hypertension    Relevant Medications    lisinopril (ZESTRIL) 10 mg tablet       Other    Dyslipidemia    BMI 28 0-28 9,adult - Primary    Numbness of toes    Foot pain, left    Relevant Orders    Ambulatory Referral to Podiatry    Nerve conduction test      Other Visit Diagnoses     Neuropathy        Relevant Medications    gabapentin (NEURONTIN) 400 mg capsule    Other Relevant Orders    Ambulatory Referral to Podiatry    Nerve conduction test            Subjective:      Patient ID: Tucker Fink is a 65 y o  male  1  Dm-2- no hypoglycemia  No polyuria or polydipsia  No chest pain or dyspnea  No claudication pain  No abdominal pain  No urinary tract infections  No lightheadedness  No increased fatigue  2  htn- repeat blood pressure much better  No headache dizziness or lightheadedness  No edema  No orthopnea  No palpitations  No syncope  No angioedema  No cough  3  Left LE  Pain/numbness- it starts from around mid calf area to 4 small toes  Symptoms mostly consistent  He feels it as a sharp pain with numbness  He feels it at night 2  Gabapentin 200 mg not helping  No lower back pain  No swelling in the leg  No rash  No weakness or footdrop    No open skin areas      The following portions of the patient's history were reviewed and updated as appropriate:   Past Medical History:  He has a past medical history of Aortic aneurysm ([facility] ), Decreased testosterone level, Diabetes mellitus ([facility] ), GERD (gastroesophageal reflux disease), High cholesterol, Hyperlipidemia, Hypertension, and Reduced libido ,  _______________________________________________________________________  Medical Problems:  does not have any pertinent problems on file ,  _______________________________________________________________________  Past Surgical History:   has a past surgical history that includes Colonoscopy (2015)  ,  _______________________________________________________________________  Family History:  family history includes Hypertension in his father and mother ,  _______________________________________________________________________  Social History:   reports that he has quit smoking  His smoking use included cigarettes  He has never used smokeless tobacco  He reports current alcohol use  He reports that he does not use drugs  ,  _______________________________________________________________________  Allergies:  is allergic to penicillins, metformin, and clindamycin     _______________________________________________________________________  Current Outpatient Medications   Medication Sig Dispense Refill    Empagliflozin (Jardiance) 25 MG TABS Take 1 tablet (25 mg total) by mouth every morning 90 tablet 3    gabapentin (NEURONTIN) 400 mg capsule Take 1 capsule (400 mg total) by mouth 2 (two) times a day 180 capsule 2    glimepiride (AMARYL) 4 mg tablet Take 1 tablet (4 mg total) by mouth daily with breakfast 90 tablet 3    Glucose Blood (BLOOD GLUCOSE TEST STRIPS) STRP Check sugar bid 100 strip 4    lisinopril (ZESTRIL) 10 mg tablet Take 1 tablet (10 mg total) by mouth daily 90 tablet 3    omeprazole (PriLOSEC) 20 mg delayed release capsule Take 1 capsule (20 mg total) by mouth daily 90 capsule 3    pioglitazone (ACTOS) 15 mg tablet Take 1 tablet (15 mg total) by mouth daily 90 tablet 3    tadalafil (CIALIS) 20 MG tablet Take 1 tablet (20 mg total) by mouth daily as needed for erectile dysfunction 13 tablet 3    atorvastatin (LIPITOR) 10 mg tablet Take 1 tablet (10 mg total) by mouth daily (Patient not taking: No sig reported) 90 tablet 1    meclizine (ANTIVERT) 12 5 MG tablet Take 1 tablet (12 5 mg total) by mouth every 8 (eight) hours as needed for dizziness (Patient not taking: No sig reported) 30 tablet 1     No current facility-administered medications for this visit  _______________________________________________________________________  Review of Systems   Constitutional: Negative for chills, fatigue and fever  HENT: Negative for congestion, nosebleeds and rhinorrhea  Eyes: Negative for discharge and visual disturbance  Respiratory: Negative for cough, chest tightness, shortness of breath and wheezing  Cardiovascular: Negative for chest pain  Gastrointestinal: Negative for abdominal distention, abdominal pain, constipation, diarrhea and vomiting  Endocrine: Negative for polydipsia and polyuria  Genitourinary: Negative for difficulty urinating, frequency and hematuria  Musculoskeletal: Negative for arthralgias, back pain and myalgias  Skin: Negative for rash  Allergic/Immunologic: Negative for environmental allergies  Neurological: Negative for dizziness, syncope, weakness, light-headedness and headaches  Hematological: Negative  Psychiatric/Behavioral: Negative for agitation, confusion, decreased concentration and dysphoric mood  The patient is not nervous/anxious  All other systems reviewed and are negative  Objective:  Vitals:    09/01/22 0833   BP: 116/70   BP Location: Left arm   Patient Position: Sitting   Cuff Size: Large   Pulse: 70   Resp: 16   Temp: 97 8 °F (36 6 °C)   TempSrc: Temporal   SpO2: 98%   Weight: 90 9 kg (200 lb 6 4 oz)   Height: 5' 10" (1 778 m)     Body mass index is 28 75 kg/m²  Physical Exam  Vitals and nursing note reviewed  Constitutional:       General: He is not in acute distress  Appearance: Normal appearance  He is well-developed  HENT:      Head: Normocephalic and atraumatic  Mouth/Throat:      Mouth: Mucous membranes are moist    Eyes:      General:         Right eye: No discharge  Left eye: No discharge  Neck:      Thyroid: No thyromegaly  Cardiovascular:      Rate and Rhythm: Normal rate and regular rhythm        Pulses: no weak pulses          Dorsalis pedis pulses are 1+ on the right side and 1+ on the left side  Heart sounds: Normal heart sounds  No murmur heard  Pulmonary:      Effort: Pulmonary effort is normal       Breath sounds: Normal breath sounds  No wheezing or rhonchi  Abdominal:      General: Bowel sounds are normal  There is no distension  Palpations: Abdomen is soft  Tenderness: There is no abdominal tenderness  Musculoskeletal:         General: No swelling or tenderness  Cervical back: Neck supple  Right lower leg: No edema  Left lower leg: No edema  Feet:      Right foot:      Skin integrity: No ulcer, skin breakdown, erythema, warmth, callus or dry skin  Left foot:      Skin integrity: No ulcer, skin breakdown, erythema, warmth, callus or dry skin  Lymphadenopathy:      Cervical: No cervical adenopathy  Skin:     General: Skin is warm  Capillary Refill: Capillary refill takes less than 2 seconds  Findings: No erythema  Neurological:      General: No focal deficit present  Mental Status: He is alert and oriented to person, place, and time  Psychiatric:         Mood and Affect: Mood normal          Behavior: Behavior normal          Thought Content:  Thought content normal

## 2022-09-01 NOTE — ASSESSMENT & PLAN NOTE
He does have sharp pain with numbness starting from just  above the mid calf to the left 4 toes continuously  Gabapentin 200 mg not helping  We will up gabapentin to 400 mg  Order nerve conduction study    Refer to podiatrist

## 2022-09-01 NOTE — ASSESSMENT & PLAN NOTE
Lab Results   Component Value Date    HGBA1C 8 5 (H) 05/26/2022   Advised patient to get A1c and BMP done before next visit  Meanwhile continue same medicine  Counseling given on ADA diet

## 2022-09-06 ENCOUNTER — TELEPHONE (OUTPATIENT)
Dept: FAMILY MEDICINE CLINIC | Facility: CLINIC | Age: 57
End: 2022-09-06

## 2022-09-06 NOTE — TELEPHONE ENCOUNTER
----- Message from Jen Lefort, MD sent at 9/1/2022  4:45 PM EDT -----  Please call the patient regarding his abnormal result  His A1c went up to 10 1  He needs to go on insulin  I also on him to see endocrinologist   Melisa Lozoya placed  Let me know if patient agrees for insulin

## 2022-09-06 NOTE — TELEPHONE ENCOUNTER
Called and left message on voicemail results and insulin needs to be ordered if in agreement     Juan Miguel Gabriel

## 2022-09-08 ENCOUNTER — OFFICE VISIT (OUTPATIENT)
Dept: PODIATRY | Facility: CLINIC | Age: 57
End: 2022-09-08
Payer: COMMERCIAL

## 2022-09-08 VITALS
SYSTOLIC BLOOD PRESSURE: 137 MMHG | BODY MASS INDEX: 28.7 KG/M2 | DIASTOLIC BLOOD PRESSURE: 83 MMHG | WEIGHT: 200 LBS | HEART RATE: 71 BPM

## 2022-09-08 DIAGNOSIS — R20.0 NUMBNESS OF TOES: Primary | ICD-10-CM

## 2022-09-08 DIAGNOSIS — G57.92 NEUROPATHY OF LEFT LOWER EXTREMITY: ICD-10-CM

## 2022-09-08 PROCEDURE — 3079F DIAST BP 80-89 MM HG: CPT | Performed by: PODIATRIST

## 2022-09-08 PROCEDURE — 99203 OFFICE O/P NEW LOW 30 MIN: CPT | Performed by: PODIATRIST

## 2022-09-08 PROCEDURE — 3075F SYST BP GE 130 - 139MM HG: CPT | Performed by: PODIATRIST

## 2022-09-09 NOTE — PROGRESS NOTES
Assessment/Plan:    The patient's clinical examination is consistent with possible common peroneal nerve or sural nerve entrapment neuropathy  I agree with the nerve conduction study has been ordered and is currently pending  There is an area of edematous tissue to the lateral aspect of the patient's lower leg that is tender to palpation and seems to be where the nerve entrapment may be emanating from  Consider MRI of the left lower leg if the nerve conduction study is unrevealing  I agree with his dosing of gabapentin which should help with neuropathic pain  He will follow-up with me in 4-6 weeks at which time hopefully we can review the results of this nerve study and developed a more comprehensive treatment plan  Diagnoses and all orders for this visit:    Numbness of toes    Neuropathy of left lower extremity          Subjective:      Patient ID: Michaelle Jimenez is a 65 y o  male  Patient presents today for his initial consultation with [address] [place] [address] for pain to the lateral aspect of his left lower leg and foot extending to his 3rd 4th and 5th toes  The patient states that he has had this condition for about 2 months  He denies any recent injury or trauma to the left lower leg  He does have a history of diabetes, he denies any history of lower back pain        The following portions of the patient's history were reviewed and updated as appropriate: allergies, current medications, past family history, past medical history, past social history, past surgical history and problem list       PAST MEDICAL HISTORY:  Past Medical History:   Diagnosis Date    [facility]     Decreased testosterone level     Diabetes mellitus ([facility] )     feels well today regarding blood sugar 7/22/22    GERD (gastroesophageal reflux disease)     High cholesterol     Hyperlipidemia     Hypertension     Reduced libido        PAST SURGICAL HISTORY:  Past Surgical History:   Procedure Laterality Date    COLONOSCOPY  2015        ALLERGIES:  Penicillins, Metformin, and Clindamycin    MEDICATIONS:  Current Outpatient Medications   Medication Sig Dispense Refill    atorvastatin (LIPITOR) 10 mg tablet Take 1 tablet (10 mg total) by mouth daily (Patient not taking: No sig reported) 90 tablet 1    Empagliflozin (Jardiance) 25 MG TABS Take 1 tablet (25 mg total) by mouth every morning 90 tablet 3    gabapentin (NEURONTIN) 400 mg capsule Take 1 capsule (400 mg total) by mouth 2 (two) times a day 180 capsule 2    glimepiride (AMARYL) 4 mg tablet Take 1 tablet (4 mg total) by mouth daily with breakfast 90 tablet 3    Glucose Blood (BLOOD GLUCOSE TEST STRIPS) STRP Check sugar bid 100 strip 4    lisinopril (ZESTRIL) 10 mg tablet Take 1 tablet (10 mg total) by mouth daily 90 tablet 3    meclizine (ANTIVERT) 12 5 MG tablet Take 1 tablet (12 5 mg total) by mouth every 8 (eight) hours as needed for dizziness (Patient not taking: No sig reported) 30 tablet 1    omeprazole (PriLOSEC) 20 mg delayed release capsule Take 1 capsule (20 mg total) by mouth daily 90 capsule 3    pioglitazone (ACTOS) 15 mg tablet Take 1 tablet (15 mg total) by mouth daily 90 tablet 3    tadalafil (CIALIS) 20 MG tablet Take 1 tablet (20 mg total) by mouth daily as needed for erectile dysfunction 13 tablet 3     No current facility-administered medications for this visit         SOCIAL HISTORY:  Social History     Socioeconomic History    Marital status: /Civil Union     Spouse name: Not on file    Number of children: Not on file    Years of education: Not on file    Highest education level: Not on file   Occupational History    Not on file   Tobacco Use    Smoking status: Former Smoker     Types: Cigarettes    Smokeless tobacco: Never Used    Tobacco comment: quit cigarettes- 2015   Vaping Use    Vaping Use: Every day    Substances: Flavoring   Substance and Sexual Activity    Alcohol use: Yes     Comment: occasional beer or wine    Drug use: No    Sexual activity: Not on file   Other Topics Concern    Not on file   Social History Narrative    · Most recent tobacco use screenin2019     · Advance directive:   Yes  10/4/2019 IRVIN    Per [place]     Social Determinants of Health     Financial Resource Strain: Not on file   Food Insecurity: Not on file   Transportation Needs: Not on file   Physical Activity: Not on file   Stress: Not on file   Social Connections: Not on file   Intimate Partner Violence: Not on file   Housing Stability: Not on file        Review of Systems   Constitutional: Negative for chills and fever  HENT: Negative for ear pain and sore throat  Eyes: Negative for pain and visual disturbance  Respiratory: Negative for cough and shortness of breath  Cardiovascular: Negative for chest pain and palpitations  Gastrointestinal: Negative for abdominal pain and vomiting  Genitourinary: Negative for dysuria and hematuria  Musculoskeletal: Negative for arthralgias and back pain  Skin: Negative for color change and rash  Neurological: Negative for seizures and syncope  Psychiatric/Behavioral: Negative  All other systems reviewed and are negative  Objective:      /83   Pulse 71   Wt 90 7 kg (200 lb)   BMI 28 70 kg/m²          Physical Exam  Vitals reviewed  Constitutional:       Appearance: Normal appearance  HENT:      Head: Normocephalic and atraumatic  Nose: Nose normal    Eyes:      Conjunctiva/sclera: Conjunctivae normal       Pupils: Pupils are equal, round, and reactive to light  Pulmonary:      Effort: Pulmonary effort is normal    Feet:      Comments:  There is tenderness to palpation to the patient's left lower lateral leg beginning just distal to the fibular head; there is an area of dense seemingly inflamed tissue to the lateral leg in this region that is also tender to palpation; there is no erythema no ecchymosis no calor; there is no significant pain with calf squeeze; there are paresthesias running down the lateral leg emanating from the area of the fibular head and neck done along the course of the sural nerve around the ankle and extending to the lateral 3 lesser digits; there is some reduced dorsiflexor and plantar flexor range of motion of the left ankle, but I am unsure if this is secondary to motor neuropathy or guarding from pain  Skin:     General: Skin is warm  Capillary Refill: Capillary refill takes less than 2 seconds  Neurological:      General: No focal deficit present  Mental Status: He is alert and oriented to person, place, and time  Psychiatric:         Mood and Affect: Mood normal          Behavior: Behavior normal          Thought Content:  Thought content normal

## 2022-10-20 ENCOUNTER — OFFICE VISIT (OUTPATIENT)
Dept: PODIATRY | Facility: CLINIC | Age: 57
End: 2022-10-20
Payer: COMMERCIAL

## 2022-10-20 VITALS
WEIGHT: 200.4 LBS | OXYGEN SATURATION: 97 % | DIASTOLIC BLOOD PRESSURE: 62 MMHG | HEIGHT: 70 IN | SYSTOLIC BLOOD PRESSURE: 132 MMHG | HEART RATE: 77 BPM | BODY MASS INDEX: 28.69 KG/M2

## 2022-10-20 DIAGNOSIS — M79.89 MASS OF SOFT TISSUE OF LOWER LEG: Primary | ICD-10-CM

## 2022-10-20 PROCEDURE — 99212 OFFICE O/P EST SF 10 MIN: CPT | Performed by: PODIATRIST

## 2022-10-20 RX ORDER — DULOXETIN HYDROCHLORIDE 20 MG/1
20 CAPSULE, DELAYED RELEASE ORAL DAILY
Qty: 30 CAPSULE | Refills: 1 | Status: SHIPPED | OUTPATIENT
Start: 2022-10-20

## 2022-10-20 NOTE — PROGRESS NOTES
Assessment/Plan:     The patient's clinical examination today is essentially unchanged from his prior visit  There is a persistence of a palpable soft tissue mass near the fibular head and neck proximal with radiating pain down the lateral leg to the dorsum of the left foot extending into the 3rd 4th and 5th toes  A recent venous duplex study of the left lower leg was negative for DVT with normal response to augmentation maneuvers  He is currently scheduled for his EMG study in early December  I stressed the importance of this examination for assistance with diagnosis of his current condition  In the interim, I will order and diagnostic ultrasound study of the soft tissue to rule out a underlying mass in the area of the fibular head and neck which may be impinging on the common peroneal nerve  He states that he has stopped taking gabapentin as it is not helping with his left lower leg pain  He expressed desire to try different type of medication for management of his discomfort  I will try him on a course of Cymbalta 20 mg to be taken daily, will consider titrating up at his next visit in 3-4 weeks  Recommend follow-up in 3-4 weeks to review ultrasound study results  Consider consultation with Dr Paulie Hawthorne for potential nerve release study if the ultrasound findings warrant this  Diagnoses and all orders for this visit:    Mass of soft tissue of lower leg  -     Diagnostic ultrasound of joints; Future  -     DULoxetine (CYMBALTA) 20 mg capsule; Take 1 capsule (20 mg total) by mouth daily          Subjective:     Patient ID: Beth Forrester is a 65 y o  male  The patient presents today for follow-up of left lower extremity pain and paresthesias and numbness that has been present now for several months  He notes no significant change since his last visit  He is still awaiting his EMG/NCV study of the left lower extremity    He states that gabapentin does not help with his leg pain as stop taking it   He is requesting a different type of medication to help with this  He seems somewhat frustrated as he has not had a concrete diagnosis as to what could be going on as far  I expressed to him that hopefully his upcoming nerve study will shed light upon his current situation  Review of Systems   Constitutional: Negative for chills and fever  HENT: Negative for ear pain and sore throat  Eyes: Negative for pain and visual disturbance  Respiratory: Negative for cough and shortness of breath  Cardiovascular: Negative for chest pain and palpitations  Gastrointestinal: Negative for abdominal pain and vomiting  Genitourinary: Negative for dysuria and hematuria  Musculoskeletal: Negative for arthralgias and back pain  Skin: Negative for color change and rash  Neurological: Negative for seizures and syncope  Psychiatric/Behavioral: Negative  All other systems reviewed and are negative  Objective:     Physical Exam  Vitals reviewed  Constitutional:       Appearance: Normal appearance  HENT:      Head: Normocephalic and atraumatic  Nose: Nose normal    Eyes:      Conjunctiva/sclera: Conjunctivae normal       Pupils: Pupils are equal, round, and reactive to light  Cardiovascular:      Pulses:           Dorsalis pedis pulses are 2+ on the left side  Posterior tibial pulses are 1+ on the left side  Pulmonary:      Effort: Pulmonary effort is normal    Feet:      Comments: There is persistent tenderness to palpation to the lateral aspect of the patient's left lower leg extending down around the lateral ankle to the left 3rd 4th and 5th toes; there remains a palpable soft tissue mass to the upper lateral aspect of the patient's lower left leg near the area of the fibular head and neck; rule out compression neuropathy secondary to the soft tissue mass upon the common peroneal nerve  Skin:     General: Skin is warm        Capillary Refill: Capillary refill takes less than 2 seconds  Neurological:      General: No focal deficit present  Mental Status: He is alert and oriented to person, place, and time  Psychiatric:         Mood and Affect: Mood normal          Behavior: Behavior normal          Thought Content:  Thought content normal

## 2022-11-02 NOTE — LETTER
September 9, 2022     Pradip Perkins, [address] [facility] [address] [address]    Patient: Bennie Leary   YOB: 1965   Date of Visit: 9/8/2022       Dear Dr Lupe Alcantara: Thank you for referring Johnathan Hall to me for evaluation  Below are my notes for this consultation  If you have questions, please do not hesitate to call me  I look forward to following your patient along with you  Sincerely,        Joanie Bella DPM        CC: No Recipients  Waleska Ford  9/8/2022  8:21 PM  Signed  Assessment/Plan:    The patient's clinical examination is consistent with possible common peroneal nerve or sural nerve entrapment neuropathy  I agree with the nerve conduction study has been ordered and is currently pending  There is an area of edematous tissue to the lateral aspect of the patient's lower leg that is tender to palpation and seems to be where the nerve entrapment may be emanating from  Consider MRI of the left lower leg if the nerve conduction study is unrevealing  I agree with his dosing of gabapentin which should help with neuropathic pain  He will follow-up with me in 4-6 weeks at which time hopefully we can review the results of this nerve study and developed a more comprehensive treatment plan  Diagnoses and all orders for this visit:    Numbness of toes    Neuropathy of left lower extremity          Subjective:      Patient ID: Bennie Leary is a 65 y o  male  Patient presents today for his initial consultation with [address] [place] [address] for pain to the lateral aspect of his left lower leg and foot extending to his 3rd 4th and 5th toes  The patient states that he has had this condition for about 2 months  He denies any recent injury or trauma to the left lower leg  He does have a history of diabetes, he denies any history of lower back pain        The following portions of the patient's history were reviewed and updated as appropriate: allergies, current medications, past family history, past medical history, past social history, past surgical history and problem list       PAST MEDICAL HISTORY:  Past Medical History:   Diagnosis Date    Aortic aneurysm ([facility] [address] )     Decreased testosterone level     Diabetes mellitus ([facility] )     feels well today regarding blood sugar 7/22/22    GERD (gastroesophageal reflux disease)     High cholesterol     Hyperlipidemia     Hypertension     Reduced libido        PAST SURGICAL HISTORY:  Past Surgical History:   Procedure Laterality Date    COLONOSCOPY  2015        ALLERGIES:  Penicillins, Metformin, and Clindamycin    MEDICATIONS:  Current Outpatient Medications   Medication Sig Dispense Refill    atorvastatin (LIPITOR) 10 mg tablet Take 1 tablet (10 mg total) by mouth daily (Patient not taking: No sig reported) 90 tablet 1    Empagliflozin (Jardiance) 25 MG TABS Take 1 tablet (25 mg total) by mouth every morning 90 tablet 3    gabapentin (NEURONTIN) 400 mg capsule Take 1 capsule (400 mg total) by mouth 2 (two) times a day 180 capsule 2    glimepiride (AMARYL) 4 mg tablet Take 1 tablet (4 mg total) by mouth daily with breakfast 90 tablet 3    Glucose Blood (BLOOD GLUCOSE TEST STRIPS) STRP Check sugar bid 100 strip 4    lisinopril (ZESTRIL) 10 mg tablet Take 1 tablet (10 mg total) by mouth daily 90 tablet 3    meclizine (ANTIVERT) 12 5 MG tablet Take 1 tablet (12 5 mg total) by mouth every 8 (eight) hours as needed for dizziness (Patient not taking: No sig reported) 30 tablet 1    omeprazole (PriLOSEC) 20 mg delayed release capsule Take 1 capsule (20 mg total) by mouth daily 90 capsule 3    pioglitazone (ACTOS) 15 mg tablet Take 1 tablet (15 mg total) by mouth daily 90 tablet 3    tadalafil (CIALIS) 20 MG tablet Take 1 tablet (20 mg total) by mouth daily as needed for erectile dysfunction 13 tablet 3     No current facility-administered medications for this visit         SOCIAL HISTORY:  Social History     Socioeconomic History    Marital status: /Civil Union     Spouse name: Not on file    Number of children: Not on file    Years of education: Not on file    Highest education level: Not on file   Occupational History    Not on file   Tobacco Use    Smoking status: Former Smoker     Types: Cigarettes    Smokeless tobacco: Never Used    Tobacco comment: quit cigarettes-    Vaping Use    Vaping Use: Every day    Substances: Flavoring   Substance and Sexual Activity    Alcohol use: Yes     Comment: occasional beer or wine    Drug use: No    Sexual activity: Not on file   Other Topics Concern    Not on file   Social History Narrative    · Most recent tobacco use screenin2019     · Advance directive:   Yes  10/4/2019     Per Mackenzie [place]     Social Determinants of Health     Financial Resource Strain: Not on file   Food Insecurity: Not on file   Transportation Needs: Not on file   Physical Activity: Not on file   Stress: Not on file   Social Connections: Not on file   Intimate Partner Violence: Not on file   Housing Stability: Not on file        Review of Systems   Constitutional: Negative for chills and fever  HENT: Negative for ear pain and sore throat  Eyes: Negative for pain and visual disturbance  Respiratory: Negative for cough and shortness of breath  Cardiovascular: Negative for chest pain and palpitations  Gastrointestinal: Negative for abdominal pain and vomiting  Genitourinary: Negative for dysuria and hematuria  Musculoskeletal: Negative for arthralgias and back pain  Skin: Negative for color change and rash  Neurological: Negative for seizures and syncope  Psychiatric/Behavioral: Negative  All other systems reviewed and are negative  Objective:      /83   Pulse 71   Wt 90 7 kg (200 lb)   BMI 28 70 kg/m²          Physical Exam  Vitals reviewed  Constitutional:       Appearance: Normal appearance     HENT: Head: Normocephalic and atraumatic  Nose: Nose normal    Eyes:      Conjunctiva/sclera: Conjunctivae normal       Pupils: Pupils are equal, round, and reactive to light  Pulmonary:      Effort: Pulmonary effort is normal    Feet:      Comments: There is tenderness to palpation to the patient's left lower lateral leg beginning just distal to the fibular head; there is an area of dense seemingly inflamed tissue to the lateral leg in this region that is also tender to palpation; there is no erythema no ecchymosis no calor; there is no significant pain with calf squeeze; there are paresthesias running down the lateral leg emanating from the area of the fibular head and neck done along the course of the sural nerve around the ankle and extending to the lateral 3 lesser digits; there is some reduced dorsiflexor and plantar flexor range of motion of the left ankle, but I am unsure if this is secondary to motor neuropathy or guarding from pain  Skin:     General: Skin is warm  Capillary Refill: Capillary refill takes less than 2 seconds  Neurological:      General: No focal deficit present  Mental Status: He is alert and oriented to person, place, and time  Psychiatric:         Mood and Affect: Mood normal          Behavior: Behavior normal          Thought Content:  Thought content normal  Griseofulvin Counseling:  I discussed with the patient the risks of griseofulvin including but not limited to photosensitivity, cytopenia, liver damage, nausea/vomiting and severe allergy.  The patient understands that this medication is best absorbed when taken with a fatty meal (e.g., ice cream or french fries).

## 2022-11-28 ENCOUNTER — RA CDI HCC (OUTPATIENT)
Dept: OTHER | Facility: HOSPITAL | Age: 57
End: 2022-11-28

## 2022-11-28 NOTE — PROGRESS NOTES
[facility]  coding opportunities          Chart Reviewed number of suggestions sent to Provider: 2     Patients Insurance        Commercial Insurance: [employer]     [id number]  [id number]

## 2022-12-02 ENCOUNTER — OFFICE VISIT (OUTPATIENT)
Dept: FAMILY MEDICINE CLINIC | Facility: CLINIC | Age: 57
End: 2022-12-02

## 2022-12-02 VITALS
DIASTOLIC BLOOD PRESSURE: 82 MMHG | SYSTOLIC BLOOD PRESSURE: 126 MMHG | BODY MASS INDEX: 29.18 KG/M2 | OXYGEN SATURATION: 98 % | WEIGHT: 203.8 LBS | HEART RATE: 75 BPM | HEIGHT: 70 IN | TEMPERATURE: 97.1 F | RESPIRATION RATE: 16 BRPM

## 2022-12-02 DIAGNOSIS — E11.69 DIABETES MELLITUS TYPE 2 IN OBESE (HCC): Primary | ICD-10-CM

## 2022-12-02 DIAGNOSIS — I10 ESSENTIAL HYPERTENSION: ICD-10-CM

## 2022-12-02 DIAGNOSIS — E78.5 DYSLIPIDEMIA: ICD-10-CM

## 2022-12-02 DIAGNOSIS — K21.9 GASTROESOPHAGEAL REFLUX DISEASE WITHOUT ESOPHAGITIS: ICD-10-CM

## 2022-12-02 DIAGNOSIS — E66.9 DIABETES MELLITUS TYPE 2 IN OBESE (HCC): Primary | ICD-10-CM

## 2022-12-02 NOTE — ASSESSMENT & PLAN NOTE
Lab Results   Component Value Date    HGBA1C 10 1 (H) 09/01/2022   Patient is noncompliant with regular medicine taking  He forgets 3-4 times per week  Counseling was given about importance of medicine and blood sugar control    Recheck A1c and BMP

## 2022-12-02 NOTE — PROGRESS NOTES
Name: Pretty Rios      : 1965      MRN: [id number]  Encounter Provider: Cassandra Ramos MD  Encounter Date: 2022   Encounter department: [address] [place] [address]     1  Diabetes mellitus type 2 in obese [facility])  Assessment & Plan:    Lab Results   Component Value Date    HGBA1C 10 1 (H) 2022   Patient is noncompliant with regular medicine taking  He forgets 3-4 times per week  Counseling was given about importance of medicine and blood sugar control  Recheck A1c and BMP    Orders:  -     Hemoglobin A1C; Future; Expected date: 2023  -     Basic metabolic panel; Future; Expected date: 2023    2  Gastroesophageal reflux disease without esophagitis    3  Essential hypertension  Assessment & Plan:  Blood pressure under control  BMP reviewed and interpreted      4  BMI 28 0-28 9,adult    5  Dyslipidemia  Assessment & Plan:  Patient is continue to decline atorvastatin  Counseling was given about importance of medicine           Subjective      1  Dm-2- he denies polyuria or polydipsia  No numbness or tingling  No abdominal pain  No chest pain or dyspnea  No edema  No increased fatigue  He does forget taking pills 3-4 times every week  No urinary tract infections  2  htn- no headache dizziness or lightheadedness  No palpitations  No syncope  No edema  No chest pain or dyspnea  3  Hyperlipidemia  He is not taking atorvastatin  He does not want to take it  Counseling given on importance of statin medications  No claudication pain  No chest pain  Review of Systems   Constitutional: Negative for chills, fatigue and fever  HENT: Negative for congestion, nosebleeds and rhinorrhea  Eyes: Negative for discharge and visual disturbance  Respiratory: Negative for cough, chest tightness, shortness of breath and wheezing  Cardiovascular: Negative for chest pain     Gastrointestinal: Negative for abdominal distention, abdominal pain, constipation, diarrhea and vomiting  Endocrine: Negative for polydipsia and polyuria  Genitourinary: Negative for difficulty urinating, frequency and hematuria  Musculoskeletal: Negative for arthralgias, back pain and myalgias  Skin: Negative for rash  Allergic/Immunologic: Negative for environmental allergies  Neurological: Negative for dizziness, syncope, weakness, light-headedness and headaches  Hematological: Negative  Psychiatric/Behavioral: Negative for agitation, confusion, decreased concentration and dysphoric mood  The patient is not nervous/anxious  All other systems reviewed and are negative        Current Outpatient Medications on File Prior to Visit   Medication Sig   • DULoxetine (CYMBALTA) 20 mg capsule Take 1 capsule (20 mg total) by mouth daily   • Empagliflozin (Jardiance) 25 MG TABS Take 1 tablet (25 mg total) by mouth every morning   • gabapentin (NEURONTIN) 400 mg capsule Take 1 capsule (400 mg total) by mouth 2 (two) times a day   • glimepiride (AMARYL) 4 mg tablet Take 1 tablet (4 mg total) by mouth daily with breakfast   • Glucose Blood (BLOOD GLUCOSE TEST STRIPS) STRP Check sugar bid   • lisinopril (ZESTRIL) 10 mg tablet Take 1 tablet (10 mg total) by mouth daily   • omeprazole (PriLOSEC) 20 mg delayed release capsule Take 1 capsule (20 mg total) by mouth daily   • pioglitazone (ACTOS) 15 mg tablet Take 1 tablet (15 mg total) by mouth daily   • tadalafil (CIALIS) 20 MG tablet Take 1 tablet (20 mg total) by mouth daily as needed for erectile dysfunction   • atorvastatin (LIPITOR) 10 mg tablet Take 1 tablet (10 mg total) by mouth daily (Patient not taking: No sig reported)   • meclizine (ANTIVERT) 12 5 MG tablet Take 1 tablet (12 5 mg total) by mouth every 8 (eight) hours as needed for dizziness (Patient not taking: Reported on 8/30/2021)       Objective     /82 (BP Location: Left arm, Patient Position: Sitting, Cuff Size: Adult)   Pulse 75   Temp (!) 97 1 °F (36 2 °C) (Temporal)   Resp 16   Ht 5' 10" (1 778 m)   Wt 92 4 kg (203 lb 12 8 oz)   SpO2 98%   BMI 29 24 kg/m²     Physical Exam  Vitals and nursing note reviewed  Constitutional:       General: He is not in acute distress  Appearance: Normal appearance  He is well-developed  HENT:      Head: Normocephalic and atraumatic  Eyes:      General:         Right eye: No discharge  Left eye: No discharge  Neck:      Thyroid: No thyromegaly  Cardiovascular:      Rate and Rhythm: Normal rate and regular rhythm  Pulses: Normal pulses  Heart sounds: Normal heart sounds  No murmur heard  Pulmonary:      Effort: Pulmonary effort is normal       Breath sounds: Normal breath sounds  No wheezing or rhonchi  Abdominal:      General: Bowel sounds are normal  There is no distension  Palpations: Abdomen is soft  Tenderness: There is no abdominal tenderness  Musculoskeletal:         General: No swelling or tenderness  Cervical back: Neck supple  Right lower leg: No edema  Left lower leg: No edema  Lymphadenopathy:      Cervical: No cervical adenopathy  Skin:     General: Skin is warm  Capillary Refill: Capillary refill takes less than 2 seconds  Findings: No erythema  Neurological:      General: No focal deficit present  Mental Status: He is alert and oriented to person, place, and time  Psychiatric:         Mood and Affect: Mood normal          Behavior: Behavior normal          Thought Content:  Thought content normal        Caroline Iverson MD

## 2023-02-22 ENCOUNTER — RA CDI HCC (OUTPATIENT)
Dept: OTHER | Facility: HOSPITAL | Age: 58
End: 2023-02-22

## 2023-02-22 NOTE — PROGRESS NOTES
[facility]  coding opportunities          Chart Reviewed number of suggestions sent to Provider: 3     Patients Insurance        Commercial Insurance: [employer]     [id number]  [id number]  [id number]

## 2023-03-01 ENCOUNTER — APPOINTMENT (OUTPATIENT)
Dept: LAB | Facility: CLINIC | Age: 58
End: 2023-03-01

## 2023-03-01 DIAGNOSIS — E66.9 DIABETES MELLITUS TYPE 2 IN OBESE (HCC): ICD-10-CM

## 2023-03-01 DIAGNOSIS — M62.838 SPASM OF MUSCLE: ICD-10-CM

## 2023-03-01 DIAGNOSIS — E11.69 DIABETES MELLITUS TYPE 2 IN OBESE (HCC): ICD-10-CM

## 2023-03-01 DIAGNOSIS — R20.0 NUMBNESS OF FOOT: ICD-10-CM

## 2023-03-01 LAB
ANION GAP SERPL CALCULATED.3IONS-SCNC: 7 MMOL/L (ref 4–13)
BUN SERPL-MCNC: 20 MG/DL (ref 5–25)
CALCIUM SERPL-MCNC: 9.3 MG/DL (ref 8.4–10.2)
CHLORIDE SERPL-SCNC: 100 MMOL/L (ref 96–108)
CO2 SERPL-SCNC: 29 MMOL/L (ref 21–32)
CREAT SERPL-MCNC: 0.8 MG/DL (ref 0.6–1.3)
EST. AVERAGE GLUCOSE BLD GHB EST-MCNC: 249 MG/DL
GFR SERPL CREATININE-BSD FRML MDRD: 99 ML/MIN/1.73SQ M
GLUCOSE P FAST SERPL-MCNC: 160 MG/DL (ref 65–99)
HBA1C MFR BLD: 10.3 %
MAGNESIUM SERPL-MCNC: 2.3 MG/DL (ref 1.9–2.7)
POTASSIUM SERPL-SCNC: 4 MMOL/L (ref 3.5–5.3)
SODIUM SERPL-SCNC: 136 MMOL/L (ref 135–147)
VIT B12 SERPL-MCNC: 733 PG/ML (ref 100–900)

## 2023-03-02 ENCOUNTER — OFFICE VISIT (OUTPATIENT)
Dept: FAMILY MEDICINE CLINIC | Facility: CLINIC | Age: 58
End: 2023-03-02

## 2023-03-02 VITALS
SYSTOLIC BLOOD PRESSURE: 130 MMHG | BODY MASS INDEX: 28.09 KG/M2 | HEIGHT: 70 IN | RESPIRATION RATE: 16 BRPM | TEMPERATURE: 97.6 F | HEART RATE: 73 BPM | OXYGEN SATURATION: 98 % | DIASTOLIC BLOOD PRESSURE: 80 MMHG | WEIGHT: 196.2 LBS

## 2023-03-02 DIAGNOSIS — I10 ESSENTIAL HYPERTENSION: ICD-10-CM

## 2023-03-02 DIAGNOSIS — E78.5 DYSLIPIDEMIA: ICD-10-CM

## 2023-03-02 DIAGNOSIS — Z12.5 ENCOUNTER FOR PROSTATE CANCER SCREENING: ICD-10-CM

## 2023-03-02 DIAGNOSIS — E66.9 DIABETES MELLITUS TYPE 2 IN OBESE (HCC): Primary | ICD-10-CM

## 2023-03-02 DIAGNOSIS — E11.69 DIABETES MELLITUS TYPE 2 IN OBESE (HCC): Primary | ICD-10-CM

## 2023-03-02 RX ORDER — GLIMEPIRIDE 4 MG/1
4 TABLET ORAL
Qty: 90 TABLET | Refills: 3 | Status: SHIPPED | OUTPATIENT
Start: 2023-03-02 | End: 2023-05-31

## 2023-03-02 RX ORDER — PIOGLITAZONEHYDROCHLORIDE 15 MG/1
15 TABLET ORAL DAILY
Qty: 90 TABLET | Refills: 3 | Status: SHIPPED | OUTPATIENT
Start: 2023-03-02 | End: 2023-05-31

## 2023-03-02 NOTE — PROGRESS NOTES
Name: Clarice Nick      : 1965      MRN: [id number]  Encounter Provider: Sha [place] MD  Encounter Date: 3/2/2023   Encounter department: [address] [place] [address]     1  Diabetes mellitus type 2 in obese [facility])  Assessment & Plan:    Lab Results   Component Value Date    HGBA1C 10 3 (H) 2023   missing meds 70% of times  Had counseling given to the patient about importance of blood sugar control and taking medicines regularly  This time he said I will definitely start taking them every day  Possible complications of uncontrolled diabetes were discussed with the patient  Recheck A1c BMP and microalbumin  Refill his meds  Orders:  -     Hemoglobin A1C; Future; Expected date: 2023  -     Comprehensive metabolic panel; Future; Expected date: 2023  -     CBC and Platelet; Future; Expected date: 2023  -     Microalbumin / creatinine urine ratio; Future; Expected date: 2023  -     Lipid Panel with Direct LDL reflex; Future; Expected date: 2023  -     TSH, 3rd generation with Free T4 reflex; Future; Expected date: 2023  -     Empagliflozin (Jardiance) 25 MG TABS; Take 1 tablet (25 mg total) by mouth every morning  -     pioglitazone (ACTOS) 15 mg tablet; Take 1 tablet (15 mg total) by mouth daily  -     glimepiride (AMARYL) 4 mg tablet; Take 1 tablet (4 mg total) by mouth daily with breakfast    2  Essential hypertension  Assessment & Plan:  Controlled with lisinopril  BMP reviewed      3  BMI 28 0-28 9,adult    4  Dyslipidemia  Assessment & Plan:  Continues to decline atorvastatin  He wants to rely on garlic  He understands the benefit of atorvastatin  5  Encounter for prostate cancer screening  -     PSA, Total Screen; Future    BMI Counseling: Body mass index is 28 15 kg/m²   The BMI is above normal  Nutrition recommendations include decreasing portion sizes, decreasing fast food intake, consuming healthier snacks, limiting drinks that contain sugar, moderation in carbohydrate intake and reducing intake of cholesterol  Exercise recommendations include exercising 3-5 times per week and strength training exercises  Rationale for BMI follow-up plan is due to patient being overweight or obese  Subjective      1  Dm-2-A1c still around 10 2  Blood sugar was 160   70% of time he is missing his medicines  He is feeling tired  No abdominal pain  No nausea or vomiting  No diarrhea  No change in the vision  No dizziness or lightheadedness  No claudication pain  No chest pain or dyspnea  2  Dyslipidemia-he declined to take atorvastatin  He understands the benefit  He is relying on garlic  No claudication pain  No chest pain  3  htn-no headache dizziness or lightheadedness  No palpitations  No edema  No cough  No chest pain  Review of Systems   Constitutional: Negative for chills, fatigue and fever  HENT: Negative for congestion, nosebleeds and rhinorrhea  Eyes: Negative for discharge and visual disturbance  Respiratory: Negative for cough, chest tightness, shortness of breath and wheezing  Cardiovascular: Negative for chest pain  Gastrointestinal: Negative for abdominal distention, abdominal pain, constipation, diarrhea and vomiting  Endocrine: Negative for polydipsia and polyuria  Genitourinary: Negative for difficulty urinating, frequency and hematuria  Musculoskeletal: Negative for arthralgias, back pain and myalgias  Skin: Negative for rash  Allergic/Immunologic: Negative for environmental allergies  Neurological: Negative for dizziness, syncope, weakness, light-headedness and headaches  Hematological: Negative  Psychiatric/Behavioral: Negative for agitation, confusion, decreased concentration and dysphoric mood  The patient is not nervous/anxious  All other systems reviewed and are negative        Current Outpatient Medications on File Prior to Visit   Medication Sig   • DULoxetine (CYMBALTA) 20 mg capsule Take 1 capsule (20 mg total) by mouth daily   • lisinopril (ZESTRIL) 10 mg tablet Take 1 tablet (10 mg total) by mouth daily   • omeprazole (PriLOSEC) 20 mg delayed release capsule Take 1 capsule (20 mg total) by mouth daily   • tadalafil (CIALIS) 20 MG tablet Take 1 tablet (20 mg total) by mouth daily as needed for erectile dysfunction   • [DISCONTINUED] Empagliflozin (Jardiance) 25 MG TABS Take 1 tablet (25 mg total) by mouth every morning   • [DISCONTINUED] glimepiride (AMARYL) 4 mg tablet Take 1 tablet (4 mg total) by mouth daily with breakfast   • [DISCONTINUED] pioglitazone (ACTOS) 15 mg tablet Take 1 tablet (15 mg total) by mouth daily   • atorvastatin (LIPITOR) 10 mg tablet Take 1 tablet (10 mg total) by mouth daily (Patient not taking: No sig reported)   • gabapentin (NEURONTIN) 400 mg capsule Take 1 capsule (400 mg total) by mouth 2 (two) times a day (Patient not taking: Reported on 3/2/2023)   • Glucose Blood (BLOOD GLUCOSE TEST STRIPS) STRP Check sugar bid   • meclizine (ANTIVERT) 12 5 MG tablet Take 1 tablet (12 5 mg total) by mouth every 8 (eight) hours as needed for dizziness (Patient not taking: Reported on 8/30/2021)       Objective     /80 (BP Location: Left arm, Patient Position: Sitting, Cuff Size: Large)   Pulse 73   Temp 97 6 °F (36 4 °C) (Temporal)   Resp 16   Ht 5' 10" (1 778 m)   Wt 89 kg (196 lb 3 2 oz)   SpO2 98%   BMI 28 15 kg/m²     Physical Exam  Vitals and nursing note reviewed  Constitutional:       General: He is not in acute distress  Appearance: Normal appearance  He is well-developed  HENT:      Head: Normocephalic and atraumatic  Eyes:      General:         Right eye: No discharge  Left eye: No discharge  Neck:      Thyroid: No thyromegaly  Cardiovascular:      Rate and Rhythm: Normal rate and regular rhythm  Pulses: no weak pulses          Dorsalis pedis pulses are 1+ on the right side and 1+ on the left side  Heart sounds: Normal heart sounds  No murmur heard  Pulmonary:      Effort: Pulmonary effort is normal       Breath sounds: Normal breath sounds  No wheezing or rhonchi  Abdominal:      General: Bowel sounds are normal  There is no distension  Palpations: Abdomen is soft  Tenderness: There is no abdominal tenderness  Musculoskeletal:         General: No swelling or tenderness  Cervical back: Neck supple  Right lower leg: No edema  Left lower leg: No edema  Feet:      Right foot:      Skin integrity: No ulcer, skin breakdown, erythema, warmth, callus or dry skin  Left foot:      Skin integrity: No ulcer, skin breakdown, erythema, warmth, callus or dry skin  Lymphadenopathy:      Cervical: No cervical adenopathy  Skin:     General: Skin is warm  Capillary Refill: Capillary refill takes less than 2 seconds  Findings: No erythema  Neurological:      General: No focal deficit present  Mental Status: He is alert and oriented to person, place, and time  Psychiatric:         Mood and Affect: Mood normal          Behavior: Behavior normal          Thought Content: Thought content normal        Leane Mahogany, MDDiabetic Foot Exam    Patient's shoes and socks removed  Right Foot/Ankle   Right Foot Inspection  Skin Exam: skin normal and skin intact  No dry skin, no warmth, no callus, no erythema, no maceration, no abnormal color, no pre-ulcer, no ulcer and no callus  Toe Exam: ROM and strength within normal limits  Sensory   Monofilament testing: intact    Vascular  Capillary refills: < 3 seconds  The right DP pulse is 1+  Left Foot/Ankle  Left Foot Inspection  Skin Exam: skin normal and skin intact  No dry skin, no warmth, no erythema, no maceration, normal color, no pre-ulcer, no ulcer and no callus  Toe Exam: ROM and strength within normal limits       Sensory   Monofilament testing: intact    Vascular  Capillary refills: < 3 seconds  The left DP pulse is 1+       Assign Risk Category  No deformity present  No loss of protective sensation  No weak pulses  Risk: 0

## 2023-03-02 NOTE — ASSESSMENT & PLAN NOTE
Lab Results   Component Value Date    HGBA1C 10 3 (H) 03/01/2023   missing meds 70% of times  Had counseling given to the patient about importance of blood sugar control and taking medicines regularly  This time he said I will definitely start taking them every day  Possible complications of uncontrolled diabetes were discussed with the patient  Recheck A1c BMP and microalbumin  Refill his meds

## 2023-03-02 NOTE — ASSESSMENT & PLAN NOTE
Continues to decline atorvastatin  He wants to rely on garlic  He understands the benefit of atorvastatin

## 2023-05-12 ENCOUNTER — OFFICE VISIT (OUTPATIENT)
Dept: FAMILY MEDICINE CLINIC | Facility: CLINIC | Age: 58
End: 2023-05-12

## 2023-05-12 VITALS
SYSTOLIC BLOOD PRESSURE: 140 MMHG | HEART RATE: 66 BPM | DIASTOLIC BLOOD PRESSURE: 80 MMHG | HEIGHT: 70 IN | BODY MASS INDEX: 29.06 KG/M2 | OXYGEN SATURATION: 98 % | TEMPERATURE: 98.6 F | RESPIRATION RATE: 16 BRPM | WEIGHT: 203 LBS

## 2023-05-12 DIAGNOSIS — Z00.00 ANNUAL PHYSICAL EXAM: Primary | ICD-10-CM

## 2023-05-12 DIAGNOSIS — E66.3 OVERWEIGHT WITH BODY MASS INDEX (BMI) OF 29 TO 29.9 IN ADULT: ICD-10-CM

## 2023-05-12 DIAGNOSIS — E11.69 DIABETES MELLITUS TYPE 2 IN OBESE (HCC): ICD-10-CM

## 2023-05-12 DIAGNOSIS — I71.20 THORACIC AORTIC ANEURYSM WITHOUT RUPTURE, UNSPECIFIED PART (HCC): ICD-10-CM

## 2023-05-12 DIAGNOSIS — K21.9 GASTROESOPHAGEAL REFLUX DISEASE WITHOUT ESOPHAGITIS: ICD-10-CM

## 2023-05-12 DIAGNOSIS — E66.9 DIABETES MELLITUS TYPE 2 IN OBESE (HCC): ICD-10-CM

## 2023-05-12 DIAGNOSIS — I10 ESSENTIAL HYPERTENSION: ICD-10-CM

## 2023-05-12 DIAGNOSIS — M79.609 POPLITEAL PAIN: ICD-10-CM

## 2023-05-12 DIAGNOSIS — E78.5 DYSLIPIDEMIA: ICD-10-CM

## 2023-05-12 DIAGNOSIS — E11.69 ERECTILE DYSFUNCTION DUE TO DIABETES MELLITUS (HCC): ICD-10-CM

## 2023-05-12 DIAGNOSIS — N52.1 ERECTILE DYSFUNCTION DUE TO DIABETES MELLITUS (HCC): ICD-10-CM

## 2023-05-12 RX ORDER — MELOXICAM 7.5 MG/1
7.5 TABLET ORAL DAILY
Qty: 30 TABLET | Refills: 2 | Status: SHIPPED | OUTPATIENT
Start: 2023-05-12

## 2023-05-12 NOTE — ASSESSMENT & PLAN NOTE
Patient currently taking jardiance 25mg p o  daily, amaryl 4mg, actos 15mg p o  daily  To maintain low-carb low starch low sugar diet  Repeat hemoglobin A1c needed at this time     Lab Results   Component Value Date    HGBA1C 10 3 (H) 03/01/2023

## 2023-05-12 NOTE — ASSESSMENT & PLAN NOTE
Patient ordered for ultrasound left popliteal for evaluation for Baker's cyst   Currently placed on Mobic 7 5 mg for popliteal pain

## 2023-05-12 NOTE — PROGRESS NOTES
[address] [facility] [place]    NAME: Birdie Lawson  AGE: 65 y o  SEX: male  : 1965     DATE: 2023     Assessment and Plan:     Problem List Items Addressed This Visit        Digestive    Gastroesophageal reflux disease without esophagitis     Patient to maintain a GERD diet currently on omeprazole 20 mg p o  daily  Endocrine    Diabetes mellitus type 2 in obese [facility])     Patient currently taking jardiance 25mg p o  daily, amaryl 4mg, actos 15mg p o  daily  To maintain low-carb low starch low sugar diet  Repeat hemoglobin A1c needed at this time  Lab Results   Component Value Date    HGBA1C 10 3 (H) 2023            Erectile dysfunction due to diabetes mellitus ([address] )     Patient maintained on Cialis 20mg p o  daily  Lab Results   Component Value Date    HGBA1C 10 3 (H) 2023               Cardiovascular and Mediastinum    Essential hypertension     Blood pressure currently 140/80  Patient maintained on a low-sodium diet  Patient counseled on proper diet, nutrition and exercise  Recheck BP next office visit goal BP below 130/70  Thoracic aortic aneurysm without rupture (HCC)     Narrative & Impression   CT CHEST WITHOUT IV CONTRAST     INDICATION:  I71 2: Thoracic aortic aneurysm, without rupture  Patient has a past history of smoking      COMPARISON: Comparison made primarily with chest CT from 10/22/2014  Older chest CTs dating back to 2011 were also reviewed      TECHNIQUE: CT examination of the chest was performed without intravenous contrast   Axial, sagittal and coronal reformatted images were submitted for interpretation  Coronal thick section MIP (maximal intensity projection) images were also created      This examination, like all CT scans performed in the [facility], was performed utilizing techniques to minimize radiation dose exposure, including the use of iterative reconstruction and automated exposure control       FINDINGS:     LUNGS:  There is a 3 mm left lower lobe pulmonary nodule at the left costophrenic angle  This has demonstrated long-term stability since 8/28/2011, therefore is benign  There are no other pulmonary nodules  (A left upper lobe tiny pulmonary nodule   described on prior CT is no longer present )     PLEURA:  Unremarkable      HEART/GREAT VESSELS:  There is unchanged ascending thoracic aortic aneurysm at 4 7 cm      MEDIASTINUM AND ANJEL:  Unremarkable      CHEST WALL AND LOWER NECK:  Unremarkable      VISUALIZED STRUCTURES IN THE UPPER ABDOMEN:  Unremarkable      OSSEOUS STRUCTURES:  No acute fracture  No destructive osseous lesion      IMPRESSION:     Stable aneurysmal dilatation of the ascending thoracic aorta to 4 7 cm         Workstation performed: [id number]     Prior CT of the chest without contrast reviewed  Other    Overweight with body mass index (BMI) of 29 to 29 9 in adult     BMI currently 29 13 kg/M2  Patient counseled on proper diet, nutrition and exercise  Goal BMI 25 kg/M2  Recheck next office visit  Annual physical exam - Primary     Patient for annual physical exam          Dyslipidemia     Patient currently maintained on atorvastatin 10 mg p o  daily  Prior lipid panel reviewed repeat lipid panel required  Patient to maintain fish oil 1000 mg p o  daily for elevated triglycerides  Immunizations and preventive care screenings were discussed with patient today  Appropriate education was printed on patient's after visit summary  Discussed risks and benefits of prostate cancer screening  We discussed the controversial history of PSA screening for prostate cancer in the [place] as well as the risk of over detection and over treatment of prostate cancer by way of PSA screening    The patient understands that PSA blood testing is an imperfect way to screen for prostate cancer and that elevated PSA levels in the blood may also be caused by infection, inflammation, prostatic trauma or manipulation, urological procedures, or by benign prostatic enlargement  The role of the digital rectal examination in prostate cancer screening was also discussed and I discussed with him that there is large interobserver variability in the findings of digital rectal examination  Counseling:  Alcohol/drug use: discussed moderation in alcohol intake, the recommendations for healthy alcohol use, and avoidance of illicit drug use  Dental Health: discussed importance of regular tooth brushing, flossing, and dental visits  Injury prevention: discussed safety/seat belts, safety helmets, smoke detectors, carbon dioxide detectors, and smoking near bedding or upholstery  Sexual health: discussed sexually transmitted diseases, partner selection, use of condoms, avoidance of unintended pregnancy, and contraceptive alternatives  · Exercise: the importance of regular exercise/physical activity was discussed  Recommend exercise 3-5 times per week for at least 30 minutes  BMI Counseling: Body mass index is 29 13 kg/m²  The BMI is above normal  Nutrition recommendations include decreasing portion sizes, encouraging healthy choices of fruits and vegetables, decreasing fast food intake, consuming healthier snacks, limiting drinks that contain sugar, moderation in carbohydrate intake, increasing intake of lean protein, reducing intake of saturated and trans fat and reducing intake of cholesterol  Exercise recommendations include vigorous physical activity 75 minutes/week, exercising 3-5 times per week, obtaining a gym membership and strength training exercises  No pharmacotherapy was ordered  Rationale for BMI follow-up plan is due to patient being overweight or obese  Depression Screening and Follow-up Plan: Patient was screened for depression during today's encounter  They screened negative with a PHQ-2 score of 0      Lung Cancer Screening Shared Decision Making: I discussed with him that he is a candidate for lung cancer CT screening  The following Shared Decision-Making points were covered:  1  Benefits of screening were discussed, including the rates of reduction in death from lung cancer and other causes  Harms of screening were reviewed, including false positive tests, radiation exposure levels, risks of invasive procedures, risks of complications of screening, and risk of overdiagnosis  2  I counseled on the importance of adherence to annual lung cancer LDCT screening, impact of co-morbidities, and ability or willingness to undergo diagnosis and treatment  3  I counseled on the importance of maintaining abstinence as a former smoker or was counseled on the importance of smoking cessation if a current smoker    Review of Eligibility Criteria: He meets all of the criteria for Lung Cancer Screening    - He is 65 y o    - He has 20 pack year tobacco history and is a current smoker or has quit within the past 15 years  - He presents no signs or symptoms of lung cancer    After discussion, the patient decided to elect lung cancer screening  Return in about 6 months (around 11/12/2023), or if symptoms worsen or fail to improve, for Recheck, Next scheduled follow up  Chief Complaint:     Chief Complaint   Patient presents with   • Establish Care     Establish care      History of Present Illness:     Adult Annual Physical   Patient here for a comprehensive physical exam  The patient reports problems - pain at the posterior region of the left upper calf near poplitieal     Diet and Physical Activity  · Diet/Nutrition: well balanced diet, heart healthy (low sodium) diet, limited junk food, low calorie diet, low fat diet, consuming 3-5 servings of fruits/vegetables daily, adequate fiber intake and adequate whole grain intake  · Exercise: no formal exercise        Depression Screening  PHQ-2/9 Depression Screening Little interest or pleasure in doing things: 0 - not at all  Feeling down, depressed, or hopeless: 0 - not at all  PHQ-2 Score: 0  PHQ-2 Interpretation: Negative depression screen       General Health  · Sleep: sleeps poorly, gets 4-6 hours of sleep on average, unrefreshing sleep and works night shift      · Hearing: normal - bilateral   · Vision: wears glasses  · Dental: no dental visits for >1 year and brushes teeth twice daily   Health  · Symptoms include: erectile dysfunction     Review of Systems:     Review of Systems   Constitutional: Negative for activity change, appetite change, chills, fatigue, fever and unexpected weight change  HENT: Negative for congestion, ear discharge, ear pain, nosebleeds, postnasal drip, rhinorrhea, sinus pressure, sinus pain, sneezing, sore throat and voice change  Eyes: Negative for pain, redness and visual disturbance  Respiratory: Negative for cough, chest tightness, shortness of breath and wheezing  Cardiovascular: Negative for chest pain and palpitations  Gastrointestinal: Negative for abdominal distention, abdominal pain, constipation, diarrhea, nausea and vomiting  Endocrine: Negative  Genitourinary: Negative for difficulty urinating, dysuria, flank pain, frequency, hematuria and urgency  Musculoskeletal: Positive for myalgias  Negative for arthralgias  Chronic left upper posterior calf pain  Skin: Negative  Allergic/Immunologic: Negative  Neurological: Negative  Hematological: Negative  Psychiatric/Behavioral: Negative         Past Medical History:     Past Medical History:   Diagnosis Date   • Aortic aneurysm ([facility] )    • Decreased testosterone level    • Diabetes mellitus ([facility] )     feels well today regarding blood sugar 7/22/22   • GERD (gastroesophageal reflux disease)    • High cholesterol    • Hyperlipidemia    • Hypertension    • Reduced libido       Past Surgical History:     Past Surgical History:   Procedure Laterality Date   • COLONOSCOPY        Family History:     Family History   Problem Relation Age of Onset   • Hypertension Mother    • Hypertension Father       Social History:     Social History     Socioeconomic History   • Marital status: /Civil Union     Spouse name: None   • Number of children: None   • Years of education: None   • Highest education level: None   Occupational History   • None   Tobacco Use   • Smoking status: Former     Types: Cigarettes   • Smokeless tobacco: Never   • Tobacco comments:     quit cigarettes-    Vaping Use   • Vaping Use: Every day   • Substances: Flavoring   Substance and Sexual Activity   • Alcohol use: Yes     Comment: occasional beer or wine   • Drug use: No   • Sexual activity: None   Other Topics Concern   • None   Social History Narrative    · Most recent tobacco use screenin2019     · Advance directive:   Yes  10/4/2019 JS    Per [place]     Social Determinants of Health     Financial Resource Strain: Not on file   Food Insecurity: Not on file   Transportation Needs: Not on file   Physical Activity: Not on file   Stress: Not on file   Social Connections: Not on file   Intimate Partner Violence: Not on file   Housing Stability: Not on file      Current Medications:     Current Outpatient Medications   Medication Sig Dispense Refill   • DULoxetine (CYMBALTA) 20 mg capsule Take 1 capsule (20 mg total) by mouth daily 30 capsule 1   • Empagliflozin (Jardiance) 25 MG TABS Take 1 tablet (25 mg total) by mouth every morning 90 tablet 3   • glimepiride (AMARYL) 4 mg tablet Take 1 tablet (4 mg total) by mouth daily with breakfast 90 tablet 3   • Glucose Blood (BLOOD GLUCOSE TEST STRIPS) STRP Check sugar bid 100 strip 4   • lisinopril (ZESTRIL) 10 mg tablet Take 1 tablet (10 mg total) by mouth daily 90 tablet 3   • omeprazole (PriLOSEC) 20 mg delayed release capsule Take 1 capsule (20 mg total) by mouth daily 90 capsule 3   • pioglitazone (ACTOS) 15 mg "tablet Take 1 tablet (15 mg total) by mouth daily 90 tablet 3   • tadalafil (CIALIS) 20 MG tablet Take 1 tablet (20 mg total) by mouth daily as needed for erectile dysfunction 13 tablet 3   • atorvastatin (LIPITOR) 10 mg tablet Take 1 tablet (10 mg total) by mouth daily (Patient not taking: No sig reported) 90 tablet 1   • gabapentin (NEURONTIN) 400 mg capsule Take 1 capsule (400 mg total) by mouth 2 (two) times a day (Patient not taking: Reported on 3/2/2023) 180 capsule 2   • meclizine (ANTIVERT) 12 5 MG tablet Take 1 tablet (12 5 mg total) by mouth every 8 (eight) hours as needed for dizziness (Patient not taking: Reported on 8/30/2021) 30 tablet 1     No current facility-administered medications for this visit  Allergies: Allergies   Allergen Reactions   • Penicillins Swelling   • Metformin Diarrhea   • Clindamycin Hives      Physical Exam:     /80 (BP Location: Left arm, Patient Position: Sitting, Cuff Size: Large)   Pulse 66   Temp 98 6 °F (37 °C) (Temporal)   Resp 16   Ht 5' 10\" (1 778 m)   Wt 92 1 kg (203 lb)   SpO2 98%   BMI 29 13 kg/m²     Physical Exam  Vitals and nursing note reviewed  Exam conducted with a chaperone present  Constitutional:       Appearance: Normal appearance  Comments: Overweight  HENT:      Head: Normocephalic and atraumatic  Right Ear: Tympanic membrane, ear canal and external ear normal       Left Ear: Tympanic membrane, ear canal and external ear normal       Nose: Nose normal       Mouth/Throat:      Mouth: Mucous membranes are moist    Eyes:      Extraocular Movements: Extraocular movements intact  Conjunctiva/sclera: Conjunctivae normal       Pupils: Pupils are equal, round, and reactive to light  Cardiovascular:      Rate and Rhythm: Normal rate and regular rhythm  Pulses: Normal pulses  Heart sounds: Normal heart sounds  Pulmonary:      Effort: Pulmonary effort is normal       Breath sounds: Normal breath sounds   " Abdominal:      General: Abdomen is flat  Bowel sounds are normal       Palpations: Abdomen is soft  Musculoskeletal:         General: Normal range of motion  Cervical back: Normal range of motion and neck supple  Skin:     General: Skin is warm and dry  Capillary Refill: Capillary refill takes less than 2 seconds  Neurological:      General: No focal deficit present  Mental Status: He is alert and oriented to person, place, and time     Psychiatric:         Mood and Affect: Mood normal          Behavior: Behavior normal           GASTON Nguyễn  [facility] [address] [place] [address]

## 2023-05-12 NOTE — ASSESSMENT & PLAN NOTE
BMI currently 29 13 kg/M2  Patient counseled on proper diet, nutrition and exercise  Goal BMI 25 kg/M2  Recheck next office visit

## 2023-05-12 NOTE — ASSESSMENT & PLAN NOTE
Blood pressure currently 140/80  Patient maintained on a low-sodium diet  Patient counseled on proper diet, nutrition and exercise  Recheck BP next office visit goal BP below 130/70

## 2023-05-12 NOTE — ASSESSMENT & PLAN NOTE
Narrative & Impression   CT CHEST WITHOUT IV CONTRAST     INDICATION:  I71 2: Thoracic aortic aneurysm, without rupture  Patient has a past history of smoking      COMPARISON: Comparison made primarily with chest CT from 10/22/2014  Older chest CTs dating back to 8/28/2011 were also reviewed      TECHNIQUE: CT examination of the chest was performed without intravenous contrast   Axial, sagittal and coronal reformatted images were submitted for interpretation  Coronal thick section MIP (maximal intensity projection) images were also created  This examination, like all CT scans performed in the [facility], was performed utilizing techniques to minimize radiation dose exposure, including the use of iterative reconstruction and automated exposure control       FINDINGS:     LUNGS:  There is a 3 mm left lower lobe pulmonary nodule at the left costophrenic angle  This has demonstrated long-term stability since 8/28/2011, therefore is benign  There are no other pulmonary nodules  (A left upper lobe tiny pulmonary nodule   described on prior CT is no longer present )     PLEURA:  Unremarkable      HEART/GREAT VESSELS:  There is unchanged ascending thoracic aortic aneurysm at 4 7 cm      MEDIASTINUM AND ANJEL:  Unremarkable      CHEST WALL AND LOWER NECK:  Unremarkable      VISUALIZED STRUCTURES IN THE UPPER ABDOMEN:  Unremarkable      OSSEOUS STRUCTURES:  No acute fracture  No destructive osseous lesion      IMPRESSION:     Stable aneurysmal dilatation of the ascending thoracic aorta to 4 7 cm         Workstation performed: [id number]     Prior CT of the chest without contrast reviewed  Repeat CT of the chest ordered for reevaluation of thoracic aneurysm

## 2023-05-12 NOTE — ASSESSMENT & PLAN NOTE
Patient maintained on Cialis 20mg p o  daily    Lab Results   Component Value Date    HGBA1C 10 3 (H) 03/01/2023

## 2023-05-12 NOTE — ASSESSMENT & PLAN NOTE
Patient currently maintained on atorvastatin 10 mg p o  daily  Prior lipid panel reviewed repeat lipid panel required  Patient to maintain fish oil 1000 mg p o  daily for elevated triglycerides

## 2023-05-12 NOTE — PROGRESS NOTES
BMI Counseling: Body mass index is 29 13 kg/m²  The BMI is above normal  Nutrition recommendations include decreasing portion sizes, encouraging healthy choices of fruits and vegetables, decreasing fast food intake, consuming healthier snacks, limiting drinks that contain sugar, moderation in carbohydrate intake, increasing intake of lean protein, reducing intake of saturated and trans fat and reducing intake of cholesterol  Exercise recommendations include vigorous physical activity 75 minutes/week, exercising 3-5 times per week, obtaining a gym membership and strength training exercises  No pharmacotherapy was ordered  Rationale for BMI follow-up plan is due to patient being overweight or obese  Depression Screening and Follow-up Plan: Patient was screened for depression during today's encounter  They screened negative with a PHQ-2 score of 0  Lung Cancer Screening Shared Decision Making: I discussed with him that he is a candidate for lung cancer CT screening  The following Shared Decision-Making points were covered:  1  Benefits of screening were discussed, including the rates of reduction in death from lung cancer and other causes  Harms of screening were reviewed, including false positive tests, radiation exposure levels, risks of invasive procedures, risks of complications of screening, and risk of overdiagnosis  2  I counseled on the importance of adherence to annual lung cancer LDCT screening, impact of co-morbidities, and ability or willingness to undergo diagnosis and treatment    3  I counseled on the importance of maintaining abstinence as a former smoker or was counseled on the importance of smoking cessation if a current smoker    Review of Eligibility Criteria: He meets all of the criteria for Lung Cancer Screening    - He is 65 y o    - He has 20 pack year tobacco history and is a current smoker or has quit within the past 15 years  - He presents no signs or symptoms of lung cancer    After discussion, the patient decided to elect lung cancer screening  Assessment/Plan:         Problem List Items Addressed This Visit    None        Subjective:      Patient ID: Norman Chavez is a 65 y o  male  Patient is a 65year old male present for follow-up office visit with prediabetes, hypertension, hyperlipidemia, GERD, thoracic aneurysm, left calf pain, ED and elevated BMI  The following portions of the patient's history were reviewed and updated as appropriate:   Past Medical History:  He has a past medical history of Aortic aneurysm ([facility] [address] ), Decreased testosterone level, Diabetes mellitus ([facility] [address] ), GERD (gastroesophageal reflux disease), High cholesterol, Hyperlipidemia, Hypertension, and Reduced libido ,  _______________________________________________________________________  Medical Problems:  does not have any pertinent problems on file ,  _______________________________________________________________________  Past Surgical History:   has a past surgical history that includes Colonoscopy (2015)  ,  _______________________________________________________________________  Family History:  family history includes Hypertension in his father and mother ,  _______________________________________________________________________  Social History:   reports that he has quit smoking  His smoking use included cigarettes  He has never used smokeless tobacco  He reports current alcohol use  He reports that he does not use drugs  ,  _______________________________________________________________________  Allergies:  is allergic to penicillins, metformin, and clindamycin     _______________________________________________________________________  Current Outpatient Medications   Medication Sig Dispense Refill   • atorvastatin (LIPITOR) 10 mg tablet Take 1 tablet (10 mg total) by mouth daily (Patient not taking: No sig reported) 90 tablet 1   • DULoxetine (CYMBALTA) 20 mg capsule Take 1 capsule (20 mg total) by mouth daily 30 capsule 1   • Empagliflozin (Jardiance) 25 MG TABS Take 1 tablet (25 mg total) by mouth every morning 90 tablet 3   • gabapentin (NEURONTIN) 400 mg capsule Take 1 capsule (400 mg total) by mouth 2 (two) times a day (Patient not taking: Reported on 3/2/2023) 180 capsule 2   • glimepiride (AMARYL) 4 mg tablet Take 1 tablet (4 mg total) by mouth daily with breakfast 90 tablet 3   • Glucose Blood (BLOOD GLUCOSE TEST STRIPS) STRP Check sugar bid 100 strip 4   • lisinopril (ZESTRIL) 10 mg tablet Take 1 tablet (10 mg total) by mouth daily 90 tablet 3   • meclizine (ANTIVERT) 12 5 MG tablet Take 1 tablet (12 5 mg total) by mouth every 8 (eight) hours as needed for dizziness (Patient not taking: Reported on 8/30/2021) 30 tablet 1   • omeprazole (PriLOSEC) 20 mg delayed release capsule Take 1 capsule (20 mg total) by mouth daily 90 capsule 3   • pioglitazone (ACTOS) 15 mg tablet Take 1 tablet (15 mg total) by mouth daily 90 tablet 3   • tadalafil (CIALIS) 20 MG tablet Take 1 tablet (20 mg total) by mouth daily as needed for erectile dysfunction 13 tablet 3     No current facility-administered medications for this visit      _______________________________________________________________________  Review of Systems   Constitutional: Negative for activity change, appetite change, chills, fatigue, fever and unexpected weight change  HENT: Negative for congestion, ear discharge, ear pain, nosebleeds, postnasal drip, rhinorrhea, sinus pressure, sinus pain, sneezing, sore throat and voice change  Eyes: Negative for pain, redness and visual disturbance  Respiratory: Negative for cough, chest tightness, shortness of breath and wheezing  Cardiovascular: Negative for chest pain and palpitations  Gastrointestinal: Negative for abdominal distention, abdominal pain, constipation, diarrhea, nausea and vomiting  Endocrine: Negative      Genitourinary: Negative for difficulty urinating, dysuria, flank pain, frequency, hematuria and urgency  Musculoskeletal: Positive for myalgias  Negative for arthralgias  Chronic left upper posterior calf pain  Skin: Negative  Allergic/Immunologic: Negative  Neurological: Negative  Hematological: Negative  Psychiatric/Behavioral: Negative  Objective: There were no vitals filed for this visit  There is no height or weight on file to calculate BMI  Physical Exam  Vitals and nursing note reviewed  Constitutional:       Appearance: Normal appearance  He is well-developed  Comments: Overweight  HENT:      Head: Normocephalic and atraumatic  Right Ear: Tympanic membrane, ear canal and external ear normal       Left Ear: Tympanic membrane, ear canal and external ear normal       Nose: Nose normal  No congestion or rhinorrhea  Mouth/Throat:      Mouth: Mucous membranes are moist       Pharynx: No oropharyngeal exudate or posterior oropharyngeal erythema  Eyes:      Extraocular Movements: Extraocular movements intact  Conjunctiva/sclera: Conjunctivae normal       Pupils: Pupils are equal, round, and reactive to light  Cardiovascular:      Rate and Rhythm: Normal rate and regular rhythm  Pulses: Normal pulses  Heart sounds: Normal heart sounds  No murmur heard  Pulmonary:      Effort: Pulmonary effort is normal       Breath sounds: Normal breath sounds  Abdominal:      General: Bowel sounds are normal       Palpations: Abdomen is soft  Musculoskeletal:         General: Normal range of motion  Cervical back: Normal range of motion  Skin:     General: Skin is warm  Capillary Refill: Capillary refill takes less than 2 seconds  Neurological:      General: No focal deficit present  Mental Status: He is alert and oriented to person, place, and time     Psychiatric:         Mood and Affect: Mood normal          Behavior: Behavior normal

## 2023-05-12 NOTE — PATIENT INSTRUCTIONS
Hypertension   WHAT YOU NEED TO KNOW:   What is hypertension? Hypertension is high blood pressure  Your blood pressure is the force of your blood moving against the walls of your arteries  Hypertension causes your blood pressure to get so high that your heart has to work much harder than normal  This can damage your heart  Hypertension that does not respond to medicines and lifestyle changes is called resistant hypertension  Hypertension is considered chronic when it continues for 3 months or longer  What do I need to know about the stages of hypertension? Your healthcare provider will give you a blood pressure goal based on your age, health, and risk for cardiovascular disease  The following are general guidelines on the stages of hypertension:  • Normal blood pressure is 119/79 or lower   Your healthcare provider may only check your blood pressure each year if it stays at a normal level  • Elevated blood pressure is 120/79 to 129/79   This is sometimes called prehypertension  Your healthcare provider may suggest lifestyle changes to help lower your blood pressure to a normal level  He or she may then check it again in 3 to 6 months  • Stage 1 hypertension is 130/80  to 139/89   Your provider may recommend lifestyle changes, medication, and checks every 3 to 6 months until your blood pressure is controlled  • Stage 2 hypertension is 140/90 or higher   Your provider will recommend lifestyle changes and have you take 2 kinds of hypertension medicines  You will also need to have your blood pressure checked monthly until it is controlled  What increases my risk for hypertension? The cause of hypertension may not be known  This is called essential or primary hypertension  Hypertension caused by another medical condition, such as kidney disease, is called secondary hypertension   Any of the following can increase your risk:  • Age older than 54 years (men) or 72 (women)    • Stress, or a family history of hypertension or heart disease    • Obesity, lack of exercise, or too many high-sodium foods    • Use of tobacco, alcohol, or illegal drugs    • A medical condition, such as diabetes, kidney disease, thyroid disease, or adrenal gland disorder    • Certain medicines, such as steroids or birth control pills    What are the signs and symptoms of hypertension? You may have no signs or symptoms, or you may have any of the following:  • Headache    • Blurred vision    • Chest pain    • Dizziness or weakness    • Trouble breathing    • Nosebleeds    How is hypertension diagnosed? Your healthcare provider will take your blood pressure at several visits  You may also need to check your blood pressure at home  The provider will examine you and ask about medicines you take  He or she will also ask if you have a family history of high blood pressure and about any health conditions you have  He or she will also check your blood pressure and weight and examine your heart, lungs, and eyes  You may need any of the following tests:  • An ambulatory blood pressure monitor (ABPM)  is a device that you wear  ABPM measures your blood pressure while you do your regular daily activities  It records your blood pressure every 15 to 30 minutes during the day  It also records your blood pressure every 15 minutes to 1 hour at night  The recorded blood pressures help your healthcare provider know if you have hypertension not seen at your appointment  • Blood tests  may help healthcare providers find the cause of your hypertension  Blood tests can also help find other health problems caused by hypertension  • Urine tests  will be done to check your kidney function  Kidney problems can increase your risk for hypertension  Which medicines are used to treat hypertension? • Antihypertensives  may be used to help lower your blood pressure  Several kinds of medicines are available   Your healthcare provider will choose medicines based on the kind of hypertension you have  You may need more than one type of medicine  Take the medicine exactly as directed  • Diuretics  help decrease extra fluid that collects in your body  This will help lower your blood pressure  You may urinate more often while you take this medicine  • Cholesterol medicine  helps lower your cholesterol level  A low cholesterol level helps prevent heart disease and makes it easier to control your blood pressure  What can I do to manage hypertension? • Check your blood pressure at home  Do not smoke, have caffeine, or exercise for at least 30 minutes before you check your blood pressure  Sit and rest for 5 minutes before you check your blood pressure  Extend your arm and support it on a flat surface  Your arm should be at the same level as your heart  Follow the directions that came with your blood pressure monitor  Check your blood pressure 2 times, 1 minute apart, before you take your medicine in the morning  Also check your blood pressure before your evening meal  Keep a record of your readings and bring it to your follow-up visits  Ask your healthcare provider what your blood pressure should be  • Manage any other health conditions you have  Health conditions such as diabetes can increase your risk for hypertension  Follow your healthcare provider's instructions and take all your medicines as directed  • Ask about all medicines  Certain medicines can increase your blood pressure  Examples include oral birth control pills, decongestants, herbal supplements, and NSAIDs, such as ibuprofen  Your healthcare provider can tell you which medicines are safe for you to take  This includes prescription and over-the-counter medicines  What lifestyle changes can I make to manage hypertension? Your healthcare provider may recommend you work with a team to manage hypertension   The team may include medical experts such as a dietitian, an exercise or physical therapist, and a behavior therapist  Your family members may be included in helping you create lifestyle changes  • Limit sodium (salt) as directed  Too much sodium can affect your fluid balance  Check labels to find low-sodium or no-salt-added foods  Some low-sodium foods use potassium salts for flavor  Too much potassium can also cause health problems  Your healthcare provider will tell you how much sodium and potassium are safe for you to have in a day  He or she may recommend that you limit sodium to 2,300 mg a day  • Follow the meal plan recommended by your healthcare provider  A dietitian or your provider can give you more information on low-sodium plans or the DASH (Dietary Approaches to Stop Hypertension) eating plan  The DASH plan is low in sodium, processed sugar, unhealthy fats, and total fat  It is high in potassium, calcium, and fiber  These can be found in vegetables, fruit, and whole-grain foods  • Be physically active throughout the day  Physical activity, such as exercise, can help control your blood pressure and your weight  Be physically active for at least 30 minutes per day, on most days of the week  Include aerobic activity, such as walking or riding a bicycle  Also include strength training at least 2 times each week  Your healthcare providers can help you create a physical activity plan  • Decrease stress  This may help lower your blood pressure  Learn ways to relax, such as deep breathing or listening to music  • Limit alcohol as directed  Alcohol can increase your blood pressure  A drink of alcohol is 12 ounces of beer, 5 ounces of wine, or 1½ ounces of liquor  • Do not smoke  Nicotine and other chemicals in cigarettes and cigars can increase your blood pressure and also cause lung damage  Ask your healthcare provider for information if you currently smoke and need help to quit  E-cigarettes or smokeless tobacco still contain nicotine   Talk to your healthcare provider before you use these products  Call your local emergency number (911 in the [address]) or have someone call if:   • You have chest pain  • You have any of the following signs of a heart attack:      ? Squeezing, pressure, or pain in your chest    ? You may  also have any of the following:     - Discomfort or pain in your back, neck, jaw, stomach, or arm    - Shortness of breath    - Nausea or vomiting    - Lightheadedness or a sudden cold sweat    • You become confused or have trouble speaking  • You suddenly feel lightheaded or have trouble breathing  When should I seek immediate care? • You have a severe headache or vision loss  • You have weakness in an arm or leg  When should I call my doctor? • You feel faint, dizzy, confused, or drowsy  • You have been taking your blood pressure medicine but your pressure is higher than your provider says it should be  • You have questions or concerns about your condition or care  CARE AGREEMENT:   You have the right to help plan your care  Learn about your health condition and how it may be treated  Discuss treatment options with your healthcare providers to decide what care you want to receive  You always have the right to refuse treatment  The above information is an  only  It is not intended as medical advice for individual conditions or treatments  Talk to your doctor, nurse or pharmacist before following any medical regimen to see if it is safe and effective for you  © Copyright C[employer] Emanuel 2022 Information is for End User's use only and may not be sold, redistributed or otherwise used for commercial purposes  Cholesterol and Your Health   WHAT YOU NEED TO KNOW:   What is cholesterol? Cholesterol is a waxy, fat-like substance  Your body uses cholesterol to make hormones and new cells, and to protect nerves  Cholesterol is made by your body   It also comes from certain foods you eat, such as meat and dairy products  Your healthcare provider can help you set goals for your cholesterol levels  He or she can help you create a plan to meet your goals  What are cholesterol level goals? Your cholesterol level goals depend on your risk for heart disease, your age, and your other health conditions  The following are general guidelines:  • Total cholesterol  includes low-density lipoprotein (LDL), high-density lipoprotein (HDL), and triglyceride levels  The total cholesterol level should be lower than 200 mg/dL and is best at about 150 mg/dL  • LDL cholesterol  is called bad cholesterol  because it forms plaque in your arteries  As plaque builds up, your arteries become narrow, and less blood flows through  When plaque decreases blood flow to your heart, you may have chest pain  If plaque completely blocks an artery that brings blood to your heart, you may have a heart attack  Plaque can break off and form blood clots  Blood clots may block arteries in your brain and cause a stroke  The level should be less than 130 mg/dL and is best at about 100 mg/dL  • HDL cholesterol  is called good cholesterol  because it helps remove LDL cholesterol from your arteries  It does this by attaching to LDL cholesterol and carrying it to your liver  Your liver breaks down LDL cholesterol so your body can get rid of it  High levels of HDL cholesterol can help prevent a heart attack and stroke  Low levels of HDL cholesterol can increase your risk for heart disease, heart attack, and stroke  The level should be 60 mg/dL or higher  • Triglycerides  are a type of fat that store energy from foods you eat  High levels of triglycerides also cause plaque buildup  This can increase your risk for a heart attack or stroke  If your triglyceride level is high, your LDL cholesterol level may also be high  The level should be less than 150 mg/dL  What increases my risk for high cholesterol?    • Smoking cigarettes    • Being overweight or obese, or not getting enough exercise    • Drinking large amounts of alcohol    • A medical condition such as hypertension (high blood pressure) or diabetes    • Certain genes passed from your parents to you    • Age older than 72 years    What do I need to know about having my cholesterol levels checked? Adults 21to 39years of age should have their cholesterol levels checked every 4 to 6 years  Adults 45 years or older should have their cholesterol checked every 1 to 2 years  You may need your cholesterol checked more often, or at a younger age, if you have risk factors for heart disease  You may also need to have your cholesterol checked more often if you have other health conditions, such as diabetes  Blood tests are used to check cholesterol levels  Blood tests measure your levels of triglycerides, LDL cholesterol, and HDL cholesterol  How do healthy fats affect my cholesterol levels? Healthy fats, also called unsaturated fats, help lower LDL cholesterol and triglyceride levels  Healthy fats include the following:  • Monounsaturated fats  are found in foods such as olive oil, canola oil, avocado, nuts, and olives  • Polyunsaturated fats,  such as omega 3 fats, are found in fish, such as salmon, trout, and tuna  They can also be found in plant foods such as flaxseed, walnuts, and soybeans  How do unhealthy fats affect my cholesterol levels? Unhealthy fats increase LDL cholesterol and triglyceride levels  They are found in foods high in cholesterol, saturated fat, and trans fat:  • Cholesterol  is found in eggs, dairy, and meat  • Saturated fat  is found in butter, cheese, ice cream, whole milk, and coconut oil  Saturated fat is also found in meat, such as sausage, hot dogs, and bologna  • Trans fat  is found in liquid oils and is used in fried and baked foods  Foods that contain trans fats include chips, crackers, muffins, sweet rolls, microwave popcorn, and cookies      How is high cholesterol treated? Treatment for high cholesterol will also decrease your risk of heart disease, heart attack, and stroke  Treatment may include any of the following:  • Lifestyle changes  may include food, exercise, weight loss, and quitting smoking  You may also need to decrease the amount of alcohol you drink  Your healthcare provider will want you to start with lifestyle changes  Other treatment may be added if lifestyle changes are not enough  Your healthcare provider may recommend you work with a team to manage hyperlipidemia  The team may include medical experts such as a dietitian, an exercise or physical therapist, and a behavior therapist  Your family members may be included in helping you create lifestyle changes  • Medicines  may be given to lower your LDL cholesterol, triglyceride levels, or total cholesterol level  You may need medicines to lower your cholesterol if any of the following is true:    ? You have a history of stroke, TIA, unstable angina, or a heart attack  ? Your LDL cholesterol level is 190 mg/dL or higher  ? You are age 36 to 76 years, have diabetes or heart disease risk factors, and your LDL cholesterol is 70 mg/dL or higher  • Supplements  include fish oil, red yeast rice, and garlic  Fish oil may help lower your triglyceride and LDL cholesterol levels  It may also increase your HDL cholesterol level  Red yeast rice may help decrease your total cholesterol level and LDL cholesterol level  Garlic may help lower your total cholesterol level  Do not take any supplements without talking to your healthcare provider  What food changes can I make to lower my cholesterol levels? A dietitian can help you create a healthy eating plan  He or she can show you how to read food labels and choose foods low in saturated fat, trans fats, and cholesterol  • Decrease the total amount of fat you eat    Choose lean meats, fat-free or 1% fat milk, and low-fat dairy products, such as yogurt and cheese  Try to limit or avoid red meats  Limit or do not eat fried foods or baked goods, such as cookies  • Replace unhealthy fats with healthy fats  Cook foods in olive oil or canola oil  Choose soft margarines that are low in saturated fat and trans fat  Seeds, nuts, and avocados are other examples of healthy fats  • Eat foods with omega-3 fats  Examples include salmon, tuna, mackerel, walnuts, and flaxseed  Eat fish 2 times per week  Pregnant women should not eat fish that have high levels of mercury, such as shark, swordfish, and ainsley mackerel  • Increase the amount of high-fiber foods you eat  High-fiber foods can help lower your LDL cholesterol  Aim to get between 20 and 30 grams of fiber each day  Fruits and vegetables are high in fiber  Eat at least 5 servings each day  Other high-fiber foods are whole-grain or whole-wheat breads, pastas, or cereals, and brown rice  Eat 3 ounces of whole-grain foods each day  Increase fiber slowly  You may have abdominal discomfort, bloating, and gas if you add fiber to your diet too quickly  • Eat healthy protein foods  Examples include low-fat dairy products, skinless chicken and turkey, fish, and nuts  • Limit foods and drinks that are high in sugar  Your dietitian or healthcare provider can help you create daily limits for high-sugar foods and drinks  The limit may be lower if you have diabetes or another health condition  Limits can also help you lose weight if needed  What lifestyle changes can I make to lower my cholesterol levels? • Maintain a healthy weight  Ask your healthcare provider what a healthy weight is for you  Ask him or her to help you create a weight loss plan if needed  Weight loss can decrease your total cholesterol and triglyceride levels  Weight loss may also help keep your blood pressure at a healthy level  • Be physically active throughout the day    Physical activity, such as exercise, can help lower your total cholesterol level and maintain a healthy weight  Physical activity can also help increase your HDL cholesterol level  Work with your healthcare provider to create an program that is right for you  Get at least 30 to 40 minutes of moderate physical activity most days of the week  Examples of exercise include brisk walking, swimming, or biking  Also include strength training at least 2 times each week  Your healthcare providers can help you create a physical activity plan  • Do not smoke  Nicotine and other chemicals in cigarettes and cigars can raise your cholesterol levels  Ask your healthcare provider for information if you currently smoke and need help to quit  E-cigarettes or smokeless tobacco still contain nicotine  Talk to your healthcare provider before you use these products  • Limit or do not drink alcohol  Alcohol can increase your triglyceride levels  Ask your healthcare provider before you drink alcohol  Ask how much is okay for you to drink in 24 hours or 1 week  CARE AGREEMENT:   You have the right to help plan your care  Discuss treatment options with your healthcare provider to decide what care you want to receive  You always have the right to refuse treatment  The above information is an  only  It is not intended as medical advice for individual conditions or treatments  Talk to your doctor, nurse or pharmacist before following any medical regimen to see if it is safe and effective for you  © Copyright Is[employer] Parada 2022 Information is for End User's use only and may not be sold, redistributed or otherwise used for commercial purposes  Diabetes and Nutrition   WHAT YOU NEED TO KNOW:   Why are nutrition plans important? Nutrition plans help keep blood sugar levels steady  They also help delay or prevent complications of diabetes, such as diabetic kidney disease  How do I create a nutrition plan?   A dietitian will help you create a nutrition plan to meet your needs and your family's needs  He or she may explain a plan such as the Dietary Approaches to Stop Hypertension (DASH) eating plan or the Mediterranean diet  The goal is for you to reach and maintain healthy weight, blood sugar, blood pressure, and lipid levels  You should meet with the dietitian at least 1 time each year  You will learn the following:  • How food affects your blood sugar levels    • How to create healthy eating habits    • How to make food choices based on your activity level, weight, and glucose levels    • How your favorite foods may fit into your plan    • Foods that contain carbohydrates (sugars and starches), including simple and complex carbohydrates    • How to keep track of all carbohydrates    • Correct portion sizes for each food    • Changes you can make to your plan if you get pregnant or are breastfeeding    What are some tips to do until I meet with the dietitian? • Do not skip meals  The goal is to keep your blood sugar level steady  Blood sugar levels may drop too low if you have received insulin and do not eat  • Eat more high-fiber foods  Examples include fresh or frozen fruits and vegetables, whole-grain breads, and beans  Fiber helps control or lower blood sugar and cholesterol levels  Choose whole fruits instead of fruit juice as much as possible  Sugar may be added to juice, and fiber may be removed  • Choose heart-healthy fats  Foods high in heart-healthy fats include olive oil, nuts, avocados, and fatty fish, such as salmon and tuna  Foods high in unhealthy fats include red meat, full-fat dairy products, and soft margarine  Unhealthy fats can increase your risk for heart disease, increase bad cholesterol, and lower good cholesterol  • Choose complex carbohydrates  Foods with complex carbohydrates include brown rice, whole-grain breads and cereals, and cooked beans   Foods with simple carbohydrates include white bread, white rice, most cold cereals, and snack foods  Your plan will include the amount of carbohydrate to have at one time or in a day  Your blood sugar level can get too high if you eat too much carbohydrate at one time  Blood sugar levels do not spike as high or drop as quickly with complex carbohydrates as with simple carbohydrates  Choose complex carbohydrates whenever possible  • Have less sodium (salt)  The risk for high blood pressure (BP) increases with high-sodium foods  Limit high-sodium foods, such as soy sauce, potato chips, and canned soup  Do not add salt to food you cook  Limit your use of table salt  Read labels to have no more than 2,300 milligrams of sodium in one day  • Limit artificial sweeteners  These may be found in food or drinks, such as diet soft drinks or other low-calorie beverages  Artificial sweeteners are low in calories  They may help you lower your overall calories and carbohydrates  It is important not to have more calories from other foods to make up for the calories saved  Artificial sweeteners do not have any nutrition  Eat whole foods and drink water as much as possible  Your plan may include beverages with artificial sweeteners for a short time  These can help you transition from high-sugar beverages to water  • Use the plate method for each meal   This method can help you eat the right amount of carbohydrates and keep your blood sugar levels under control  ? Draw an imaginary line down the middle of a 9-inch dinner plate  On one side, draw another line to divide that section in half  Your plate will have one large section and 2 small sections  ? Fill the largest section with non-starchy vegetables  These include broccoli, spinach, cucumbers, peppers, cauliflower, and tomatoes  ? Add a starch to one of the small sections  Starches include pasta, rice, whole-grain bread, tortillas, corn, potatoes, and beans  ? Add meat or another source of protein to the other small section    Examples include chicken or turkey without skin, fish, lean beef or pork, low-fat cheese, tofu, and eggs  ? Add dairy products or fruit next to your plate if your meal plan allows  Examples of dairy include skim or 1% milk and low-fat yogurt  If you do not drink milk or eat dairy products, you may be able to add another serving of starchy food instead  ? Have a low-calorie or calorie-free drink with your meal   Examples include water or unsweetened tea or coffee  What do I need to know if I choose to drink alcohol? • Alcohol can cause health problems  Alcohol can cause hypoglycemia (very low blood sugar level), especially if you use insulin  Alcohol can cause high blood sugar and BP levels, and weight gain if you drink too much  • Hypoglycemia can happen hours after you drink alcohol  Check your blood sugar level for several hours after you drink alcohol  Have a source of fast-acting carbohydrates with you in case your level goes too low  You need immediate care if you have signs or symptoms of hypoglycemia, such as sweating, confusion, or fainting  • Limit alcohol as directed  Generally, men 72 or older and women should limit alcohol to 1 drink within 24 hours and 7 within 1 week  Men 21 to 64 years should limit alcohol to 2 drinks a day and 14 within 1 week  Your healthcare provider can tell you how many drinks are okay for you within 24 hours or within 1 week  A drink of alcohol is 12 ounces of beer, 5 ounces of wine, or 1½ ounces of liquor  Always have food when you drink alcohol  Your blood sugar may fall to a low level if you drink when your stomach is empty  • Always have food when you drink alcohol  Your blood sugar may fall to a low level if you drink when your stomach is empty  Why is it important to maintain a healthy weight? A healthy weight can help you control your diabetes  You can maintain a healthy weight with a nutrition plan and regular physical activity   Ask your healthcare provider what a healthy weight is for you  Ask him or her to help you create a weight loss plan, if needed  Together you can set weight loss and maintenance goals  For example, your goal may be to lose at least 7% of your extra weight in the first 6 months  Call your local emergency number [phone] in the [address] [facility]) if:   • You have any of the following signs of a heart attack:      ? Squeezing, pressure, or pain in your chest    ? You may  also have any of the following:     - Discomfort or pain in your back, neck, jaw, stomach, or arm    - Shortness of breath    - Nausea or vomiting    - Lightheadedness or a sudden cold sweat      When should I seek immediate care? • You have a low blood sugar level and it does not improve with treatment  Symptoms are trouble thinking, a pounding heartbeat, and sweating  • Your blood sugar level is above 240 mg/dL and does not come down within 15 minutes of treatment  • You have ketones in your blood or urine  • You have nausea or are vomiting and cannot keep any food or liquid down  • You have blurred or double vision  • Your breath has a fruity, sweet smell, or your breathing is shallow  When should I call my doctor or diabetes care team?   • Your blood sugar levels are higher than your target goals  • You often have low blood sugar levels  • You have trouble coping with diabetes, or you feel anxious or depressed  • You have questions or concerns about your condition or care  CARE AGREEMENT:   You have the right to help plan your care  Learn about your health condition and how it may be treated  Discuss treatment options with your healthcare providers to decide what care you want to receive  You always have the right to refuse treatment  The above information is an  only  It is not intended as medical advice for individual conditions or treatments   Talk to your doctor, nurse or pharmacist before following any medical regimen to see if it is safe and effective for you  © Copyright [employer] 2022 Information is for End User's use only and may not be sold, redistributed or otherwise used for commercial purposes  GERD (Gastroesophageal Reflux Disease)   WHAT YOU NEED TO KNOW:   What is gastroesophageal reflux disease (GERD)? GERD is reflux that happens more than 2 times a week for a few weeks  Reflux means acid and food in your stomach back up into your esophagus  GERD can cause other health problems over time if it is not treated  What causes GERD? GERD often happens because the lower muscle (sphincter) of the esophagus does not close properly  The sphincter normally opens to let food into the stomach  It then closes to keep food and stomach acid in the stomach  If the sphincter does not close properly, stomach acid and food back up (reflux) into the esophagus  The following may increase your risk for GERD:  • Certain foods such as spicy foods, chocolate, foods that contain caffeine, peppermint, and fried foods    • Hiatal hernia    • Certain medicines such as calcium channel blockers (used to treat high blood pressure), allergy medicines, sedatives, or antidepressants    • Pregnancy, obesity, or scleroderma    • Lying down after a meal    • Drinking alcohol or smoking cigarettes    What are the signs and symptoms of GERD? • Heartburn (burning pain in your chest)    • Pain after meals that spreads to your neck, jaw, or shoulder    • Pain that gets better when you change positions    • Bitter or acid taste in your mouth    • A dry cough    • Trouble swallowing or pain with swallowing    • Hoarseness or a sore throat    • Burping or hiccups    • Feeling full soon after you start eating    How is GERD diagnosed? Your healthcare provider will ask about your symptoms and when they started  Tell your provider about other medical conditions you have, your eating habits, and your activities   You may also need any of the following:  • An esophageal pH test  measures the amount of stomach acid that reaches your esophagus  A small probe is used to check the amount  The probe may stay attached to the catheter  If so, the catheter is taped to your nose to hold it in place  Sometimes the probe is wireless, so the catheter is removed after the probe is placed  • An endoscopy  is a procedure used to look at the inside of your esophagus and stomach  An endoscope is a bendable tube with a light and camera on the end  Your healthcare provider may remove a small sample of tissue and send it to a lab for tests  • X-ray  pictures may be taken of your stomach and intestines (bowel)  You may be given a chalky liquid to drink before the pictures are taken  This liquid helps your stomach and intestines show up better on the x-rays  • Pressure and function  tests of your esophagus can help find problems such as a hiatal hernia  How is GERD treated? • Medicines  are used to decrease stomach acid  Medicine may also be used to help your lower esophageal sphincter and stomach contract (tighten) more  • Surgery  is done to wrap the upper part of the stomach around the esophageal sphincter  This will strengthen the sphincter and prevent reflux  How can I manage GERD? • Do not have foods or drinks that may increase heartburn  These include chocolate, peppermint, fried or fatty foods, drinks that contain caffeine, or carbonated drinks (soda)  Other foods include spicy foods, onions, tomatoes, and tomato-based foods  Do not have foods or drinks that can irritate your esophagus, such as citrus fruits, juices, and alcohol  • Do not eat large meals  When you eat a lot of food at one time, your stomach needs more acid to digest it  Eat 6 small meals each day instead of 3 large ones, and eat slowly  Do not eat meals 2 to 3 hours before bedtime  • Elevate the head of your bed  Place 6-inch blocks under the head of your bed frame   You may also use more than one pillow under your head and shoulders while you sleep  • Maintain a healthy weight  If you are overweight, weight loss may help relieve symptoms of GERD  • Do not smoke  Smoking weakens the lower esophageal sphincter and increases the risk of GERD  Ask your healthcare provider for information if you currently smoke and need help to quit  E-cigarettes or smokeless tobacco still contain nicotine  Talk to your healthcare provider before you use these products  • Do not put pressure on your abdomen  Pressure pushes acid up into your esophagus  Do not wear clothing that is tight around your waist  Do not bend over  Bend at the knees if you need to pick something up  Call your local emergency number (911 in the [address] [address]) if:   • You have severe chest pain and sudden trouble breathing  When should I seek immediate care? • You have trouble breathing after you vomit  • You have trouble swallowing, or pain with swallowing  • Your bowel movements are black, bloody, or tarry-looking  • Your vomit looks like coffee grounds or has blood in it  When should I call my doctor? • You feel full and cannot burp or vomit  • You vomit large amounts, or you vomit often  • You are losing weight without trying  • Your symptoms get worse or do not improve with treatment  • You have questions or concerns about your condition or care  CARE AGREEMENT:   You have the right to help plan your care  Learn about your health condition and how it may be treated  Discuss treatment options with your healthcare providers to decide what care you want to receive  You always have the right to refuse treatment  The above information is an  only  It is not intended as medical advice for individual conditions or treatments  Talk to your doctor, nurse or pharmacist before following any medical regimen to see if it is safe and effective for you    © Copyright [employer] 2022 Information is for End User's use only and may not be sold, redistributed or otherwise used for commercial purposes  Type 2 Diabetes Management for Adults   AMBULATORY CARE:   Type 2 diabetes  is a disease that affects how your body uses glucose (sugar)  Either your body cannot make enough insulin, or it cannot use the insulin correctly  It is important to keep diabetes controlled to prevent damage to your heart, blood vessels, and other organs  Management will help you feel well and enjoy your daily activities  Your diabetes care team providers can help you make a plan to fit diabetes care into your schedule  Your plan can change over time to fit your needs and your family's needs  Have someone call your local emergency number (911 in the [address] [facility]) if:   • You cannot be woken  • You have signs of diabetic ketoacidosis:     ? confusion, fatigue    ? vomiting    ? rapid heartbeat    ? fruity smelling breath    ? extreme thirst    ? dry mouth and skin    • You have any of the following signs of a heart attack:      ? Squeezing, pressure, or pain in your chest    ? You may  also have any of the following:     - Discomfort or pain in your back, neck, jaw, stomach, or arm    - Shortness of breath    - Nausea or vomiting    - Lightheadedness or a sudden cold sweat    • You have any of the following signs of a stroke:      ? Numbness or drooping on one side of your face     ? Weakness in an arm or leg    ? Confusion or difficulty speaking    ? Dizziness, a severe headache, or vision loss    Call your doctor or diabetes care team provider if:   • You have a sore or wound that will not heal     • You have a change in the amount you urinate  • Your blood sugar levels are higher than your target goals  • You often have lower blood sugar levels than your target goals  • Your skin is red, dry, warm, or swollen  • You have trouble coping with diabetes, or you feel anxious or depressed      • You have questions or concerns about your condition or care  What you need to know about high blood sugar levels:  High blood sugar levels may not cause any symptoms  You may feel more thirsty or urinate more often than usual  Over time, high blood sugar levels can damage your nerves, blood vessels, tissues, and organs  The following can increase your blood sugar levels:  • Large meals or large amounts of carbohydrates at one time    • Less physical activity    • Stress    • Illness    • A lower dose of diabetes medicine or insulin, or a late dose    What you need to know about low blood sugar levels:  Symptoms include feeling shaky, dizzy, irritable, or confused  You can prevent symptoms by keeping your blood sugar levels from going too low  • Treat a low blood sugar level right away:      ? Drink 4 ounces of juice or have 1 tube of glucose gel  ? Check your blood sugar level again 10 to 15 minutes later  ? When the level goes back to normal, eat a meal or snack to prevent another decrease  • Keep glucose gel, raisins, or hard candy with you at all times to treat a low blood sugar level  • Your blood sugar level can get too low if you take diabetes medicine or insulin and do not eat enough food  • If you use insulin, check your blood sugar level before you exercise  ? If your blood sugar level is below 100 mg/dL, eat 4 crackers or 2 ounces of raisins, or drink 4 ounces of juice  ? Check your level every 30 minutes if you exercise longer than 1 hour  ? You may need a snack during or after exercise  What you can do to manage your blood sugar levels:   • Check your blood sugar levels as directed and as needed  Several items are available to use to check your levels  You may need to check by testing a drop of blood in a glucose monitor  You may instead be given a continuous glucose monitoring (CGM) device  The device is worn at all times  The CGM checks your blood sugar level every 5 minutes   It sends results to an electronic device such as a smart phone  A CGM can be used with or without an insulin pump  You and your diabetes care team providers will decide on the best method for you  The goal for blood sugar levels before meals  is between 80 and 130 mg/dL and 2 hours after eating  is lower than 180 mg/dL  • Make healthy food choices  Work with a dietitian to develop a meal plan that works for you and your schedule  A dietitian can help you learn how to eat the right amount of carbohydrates during your meals and snacks  Carbohydrates can raise your blood sugar level if you eat too many at one time  Examples of foods that contain carbohydrates are breads, cereals, rice, pasta, and sweets  • Eat high-fiber foods as directed  Fiber helps improve blood sugar levels  Fiber also lowers your risk for heart disease and other problems diabetes can cause  Examples of high-fiber foods include vegetables, whole-grain bread, and beans such as olsen beans  Your dietitian can tell you how much fiber to have each day  • Get regular physical activity  Physical activity can help you get to your target blood sugar level goal and manage your weight  Get at least 150 minutes of moderate to vigorous aerobic physical activity each week  Do not miss more than 2 days in a row  Do not sit longer than 30 minutes at a time  Your healthcare provider can help you create an activity plan  The plan can include the best activities for you and can help you build your strength and endurance  • Maintain a healthy weight  Ask your team what a healthy weight is for you  A healthy weight can help you control diabetes and prevent heart disease  Ask your team to help you create a weight loss plan, if needed  Weight loss can help make a difference in managing diabetes  Your team will help you set a weight-loss goal, such as 10 to 15 pounds, or 5% of your extra weight   Together you and your team can set manageable weight loss goals     • Take your diabetes medicine or insulin as directed  You may need diabetes medicine, insulin, or both to help control your blood sugar levels  Your healthcare provider will teach you how and when to take your diabetes medicine or insulin  You will also be taught about side effects oral diabetes medicine can cause  Insulin may be injected or given through a pump or pen  You and your providers will decide on the best method for you:    ? An insulin pump  is an implanted device that gives your insulin 24 hours a day  An insulin pump prevents the need for multiple insulin injections in a day  ? An insulin pen  is a device prefilled with the right amount of insulin  ? You and your family members will be taught how to draw up and give insulin  if this is the best method for you  Your providers will also teach you how to dispose of needles and syringes  ? You will learn how much insulin you need  and when to give it  You will be taught when not to give insulin  You will also be taught what to do if your blood sugar level drops too low  This may happen if you take insulin and do not eat the right amount of carbohydrates  More ways to manage type 2 diabetes:   • Wear medical alert identification  Wear medical alert jewelry or carry a card that says you have diabetes  Ask your provider where to get these items  • Do not smoke  Nicotine and other chemicals in cigarettes and cigars can cause lung and blood vessel damage  It also makes it more difficult to manage your diabetes  Ask your provider for information if you currently smoke and need help to quit  Do not use e-cigarettes or smokeless tobacco in place of cigarettes or to help you quit  They still contain nicotine  • Check your feet each day for cuts, scratches, calluses, or other wounds  Look for redness and swelling, and feel for warmth  Wear shoes that fit well   Check your shoes for rocks or other objects that can hurt your feet  Do not walk barefoot or wear shoes without socks  Wear cotton socks to help keep your feet dry  • Ask about vaccines you may need  You have a higher risk for serious illness if you get the flu, pneumonia, COVID-19, or hepatitis  Ask your provider if you should get vaccines to prevent these or other diseases, and when to get the vaccines  • Talk to your provider if you become stressed about diabetes care  Sometimes being able to fit diabetes care into your life can cause increased stress  The stress can cause you not to take care of yourself properly  Your care team providers can help by offering tips about self-care  Your providers may suggest you talk to a mental health provider who can listen and offer help with self-care issues  • Have your A1c checked as directed  Your provider may check your A1c every 3 months, or 2 times each year if your diabetes is controlled  An A1c test shows the average amount of sugar in your blood over the past 2 to 3 months  Your provider will tell you what your A1c level should be  • Have screening tests as directed  Your provider may recommend screening for complications of diabetes and other conditions that may develop  Some screenings may begin right away and some may happen within the first 5 years of diagnosis:    ? Examples of diabetes complications  include kidney problems, high cholesterol, high blood pressure, blood vessel problems, eye problems, and sleep apnea  ? You may be screened for a low vitamin B level  if you take oral diabetes medicine for a long time  ? Women of childbearing years may be screened  for polycystic ovarian syndrome (PCOS)  Follow up with your doctor or diabetes care team providers as directed: You may need to have blood tests done before your follow-up visit  The test results will show if changes need to be made in your treatment or self-care  Talk to your provider if you cannot afford your medicine   Write down your questions so you remember to ask them during your visits  © Copyright Licha Juarez 2022 Information is for End User's use only and may not be sold, redistributed or otherwise used for commercial purposes  The above information is an  only  It is not intended as medical advice for individual conditions or treatments  Talk to your doctor, nurse or pharmacist before following any medical regimen to see if it is safe and effective for you  Basic Carbohydrate Counting   AMBULATORY CARE:   Carbohydrate counting  is a way to plan your meals by counting the amount of carbohydrate in foods  Carbohydrates are the sugars, starches, and fiber found in fruit, grains, vegetables, and milk products  Carbohydrates increase your blood sugar levels  Carbohydrate counting can help you eat the right amount of carbohydrate to keep your blood sugar levels under control  What you need to know about planning meals using carbohydrate counting:  • A dietitian or healthcare provider will help you develop a healthy meal plan that works best for you  You will be taught how much carbohydrate to eat or drink for each meal and snack  Your meal plan will be based on your age, weight, usual food intake, and physical activity level  If you have diabetes, it will also include your blood sugar levels and diabetes medicine  Once you know how much carbohydrate you should eat, you can decide what type of food you want to eat  • You will need to know what foods contain carbohydrate and how much they contain  Keep track of the amount of carbohydrate in meals and snacks in order to follow your meal plan  Do not avoid carbohydrates or skip meals  Your blood sugar may fall too low if you do not eat enough carbohydrate or you skip meals  Foods that contain carbohydrate:   • Breads:  Each serving of food listed below contains about 15 g of carbohydrate   ? 1 slice of bread (1 ounce) or 1 flour or corn tortilla (6 inch)    ?  ½ of a hamburger bun or ¼ of a large bagel (about 1 ounce)    ? 1 pancake (about 4 inches across and ¼ inch thick)    • Cereals and grains:  Serving sizes of ready-to-eat cereals vary  Look at the serving size and the total carbohydrate amount listed on the food label  Each serving of food listed below contains about 15 g of carbohydrate   ? ¾ cup of dry, unsweetened, ready-to-eat cereal or ¼ cup of low-fat granola     ? ½ cup of oatmeal or other cooked cereal     ? ? cup of cooked rice or pasta    • Starchy vegetables and beans:  Each serving of food listed below contains about 15 g of carbohydrate   ? ½ cup of corn, green peas, sweet potatoes, or mashed potatoes    ? ¼ of a large baked potato    ? ½ cup of beans, lentils, and peas (garbanzo, olsen, kidney, white, split, black-eyed)    • Crackers and snacks:  Each serving of food listed below contains about 15 g of carbohydrate   ? 3 samantha cracker squares or 8 animal crackers     ? 6 saltine-type crackers    ? 3 cups of popcorn or ¾ ounce of pretzels, potato chips, or tortilla chips    • Fruit:  Each serving of food listed below contains about 15 g of carbohydrate   ? 1 small (4 ounce) piece of fresh fruit or ¾ to 1 cup of fresh fruit    ? ½ cup of canned or frozen fruit, packed in natural juice    ? ½ cup (4 ounces) of unsweetened fruit juice    ? 2 tablespoons of dried fruit    • Desserts or sugary foods:  Each serving of food listed below contains about 15 g of carbohydrate   ? 2-inch square unfrosted cake or brownie     ? 2 small cookies    ? ½ cup of ice cream, frozen yogurt, or nondairy frozen yogurt    ? ¼ cup of sherbet or sorbet    ? 1 tablespoon of regular syrup, jam, or jelly    ? 2 tablespoons of light syrup    • Milk and yogurt:  Foods from the milk group contain about 12 g of carbohydrate per serving  ? 1 cup of fat-free or low-fat milk    ? 1 cup of soy milk    ?  ? cup of fat-free, yogurt sweetened with artificial sweetener    • Non-starchy vegetables:  Each serving contains about 5 g of carbohydrate   Three servings of non-starch vegetables count as 1 carbohydrate serving  ? ½ cup of cooked vegetables or 1 cup of raw vegetables  This includes beets, broccoli, cabbage, cauliflower, cucumber, mushrooms, tomatoes, and zucchini    ? ½ cup of vegetable juice    How to use carbohydrate counting to plan meals:   • Count carbohydrate amounts using serving sizes:      ? Pasta dinner example: You plan to have pasta, tossed salad, and an 8-ounce glass of milk  Your healthcare provider tells you that you may have 4 carbohydrate servings for dinner  One carbohydrate serving of pasta is ? cup  One cup of pasta will equal 3 carbohydrate servings  An 8-ounce glass of milk will count as 1 carbohydrate serving  These amounts of food would equal 4 carbohydrate servings  One cup of tossed salad does not count toward your carbohydrate servings  • Count carbohydrate amounts using food labels:  Find the total amount of carbohydrate in a packaged food by reading the food label  Food labels tell you the serving size of the food and the total carbohydrate amount in each serving  Find the serving size on the food label and then decide how many servings you will eat  Multiply the number of servings you plan to eat by the carbohydrate amount per serving  ? Granola bar snack example: Your meal plan allows you to have 2 carbohydrate servings (30 grams) of carbohydrate for a snack  You plan to eat 1 package of granola bars, which contains 2 bars  According to the food label, the serving size of food in this package is 1 bar  Each serving (1 bar) contains 25 grams of carbohydrate  The total amount of carbohydrate in this package of granola bars would be 50 g  Based on your meal plan, you should eat only 1 bar  Follow up with your doctor as directed:  Write down your questions so you remember to ask them during your visits    © Copyright [employer] 2022 Information is for End User's use only and may not be sold, redistributed or otherwise used for commercial purposes  The above information is an  only  It is not intended as medical advice for individual conditions or treatments  Talk to your doctor, nurse or pharmacist before following any medical regimen to see if it is safe and effective for you  Foot Care for People with Diabetes   AMBULATORY CARE:   What you need to know about foot care:  Long-term high blood sugar levels can damage the blood vessels and nerves in your legs and feet  This damage makes it hard to feel pressure, pain, temperature, and touch  You may not be able to feel a cut or sore, or shoes that are too tight  Foot care is needed to prevent serious problems, such as an infection or amputation  Diabetes may cause your toes to become crooked or curved under  These changes may affect the way you walk and can lead to increased pressure on your foot  The pressure can decrease blood flow to your feet  Lack of blood flow increases your risk for a foot ulcer  Call your care team provider if:   • Your feet become numb, weak, or hard to move  • You have pus draining from a sore on your foot  • You have a wound on your foot that gets bigger, deeper, or does not heal     • You see blisters, cuts, scratches, calluses, or sores on your foot  • You have a fever, and your feet become red, warm, and swollen  • Your toenails become thick, curled, or yellow  • You find it hard to check your feet because your vision is poor  • You have questions or concerns about your condition or care  How to care for your feet:   • Check your feet each day  Look at your whole foot, including the bottom, and between and under your toes  Check for wounds, corns, and calluses  Use a mirror to see the bottom of your feet  The skin on your feet may be shiny, tight, or darker than normal  Your feet may also be cold and pale   Feel your feet by running your hands along the tops, bottoms, sides, and between your toes  Redness, swelling, and warmth are signs of blood flow problems that can lead to a foot ulcer  Do not try to remove corns or calluses yourself  Do not ignore small problems, such as dry skin or small wounds  These can become life-threatening over time without proper care  • Wash your feet each day with soap and warm water  Do not use hot water, because this can injure your foot  Dry your feet gently with a towel after you wash them  Dry between and under your toes  • Apply lotion or a moisturizer on your dry feet  Ask your care team provider what lotions are best to use  Do not put lotion or moisturizer between your toes  Moisture between your toes could lead to skin breakdown  • Cut your toenails correctly  File or cut your toenails straight across  Use a soft brush to clean around your toenails  If your toenails are very thick, you may need to have a care team provider or specialist cut them  • Protect your feet  Do not walk barefoot or wear your shoes without socks  Check your shoes for rocks or other objects that can hurt your feet  Wear cotton socks to help keep your feet dry  Wear socks without toe seams, or wear them with the seams inside out  Change your socks each day  Do not wear socks that are dirty or damp  • Wear shoes that fit well  Wear shoes that do not rub against any area of your feet  Your shoes should be ½ to ¾ inch (1 to 2 centimeters) longer than your feet  Your shoes should also have extra space around the widest part of your feet  Walking or athletic shoes with laces or straps that adjust are best  Ask your care team provider for help to choose shoes that fit you best  Ask your provider if you need to wear an insert, orthotic, or bandage on your feet  • Go to your follow-up visits  Your care team provider will do a foot exam at least 1 time each year   You may need a foot exam more often if you have nerve damage, foot deformities, or ulcers  Your provider will check for nerve damage and how well you can feel your feet  Your provider will check your shoes to see if they fit well  • Do not smoke  Smoking can damage your blood vessels and put you at increased risk for foot ulcers  Ask your care team provider for information if you currently smoke and need help to quit  E-cigarettes or smokeless tobacco still contain nicotine  Talk to your care team provider before you use these products  Follow up with your diabetes care team provider or foot specialist as directed: You will need to have your feet checked at least 1 time each year  You may need a foot exam more often if you have nerve damage, foot deformities, or ulcers  Write down your questions so you remember to ask them during your visits  © Copyright Fawad Barroso 2022 Information is for End User's use only and may not be sold, redistributed or otherwise used for commercial purposes  The above information is an  only  It is not intended as medical advice for individual conditions or treatments  Talk to your doctor, nurse or pharmacist before following any medical regimen to see if it is safe and effective for you  What to Do if Your Blood Sugar is Low   AMBULATORY CARE:   Low blood sugar levels  (hypoglycemia) can happen with Type 1 and Type 2 diabetes  Low levels are more likely to happen if you use insulin  Hypoglycemia can cause you to have falls, accidents, and injuries  A blood sugar level that gets too low can lead to seizures, coma, and death  Learn to recognize the symptoms early so you can get treatment quickly  When your blood sugar is low you may feel:  • Sweaty    • Nervous or shaky    • Anxious or irritable    • Confused    • A fast, pounding heartbeat    • Extremely hungry    Have someone call your local emergency number (911 in the [address] [facility]) if:   • You cannot be woken      • You have a seizure  Call your doctor if:   • You have symptoms of a low blood sugar level, such as trouble thinking, sweating, or a pounding heartbeat  • Your blood sugar level is lower than normal and it does not improve with treatment  • You often have lower blood sugar levels than your target goals  • You have trouble coping with your illness, or you feel anxious or depressed  • You have questions or concerns about your condition or care  What to do if you have symptoms of low blood sugar:   • Check your blood sugar level, if possible  Your blood sugar level is too low if it is at or below 70 mg/dL  • Eat or drink 15 grams of fast-acting carbohydrate  Fast-acting carbohydrates will raise your blood sugar level quickly  Examples of 15 grams of fast-acting carbohydrates:     ? 4 ounces (½ cup) of fruit juice     ? 4 ounces of regular soda    ? 2 tablespoons of raisins     ? 1 tube of glucose gel or 3 to 4 glucose tablets       • Check your blood sugar level 15 minutes later  If the level is still low (less than 100 mg/dL), eat another 15 grams of carbohydrate  When the level returns to 100 mg/dL, eat a snack or meal that contains carbohydrates  This will help prevent another drop in blood sugar  • Teach people close to you how to use your glucagon kit  Your blood sugar may be too low for you to be awake  People need to know when and how to use your kit  Prevent low blood sugar levels:  Prevent low blood sugar by knowing what increases your risk  Ask your healthcare provider for ways to prevent low blood sugar levels   Any of the following can increase your risk of low blood sugar:  • Fasting for tests or procedures    • During or after intense exercise    • Late or postponed meals    • Sleeping (you may need a bedtime snack)     • Drinking alcohol if you use insulin or insulin releasing pills    Follow up with your doctor as directed:  Write down your questions so you remember to ask them during your visits  © Marg[employer] Prince [employer]obar 2022 Information is for End User's use only and may not be sold, redistributed or otherwise used for commercial purposes  The above information is an  only  It is not intended as medical advice for individual conditions or treatments  Talk to your doctor, nurse or pharmacist before following any medical regimen to see if it is safe and effective for you  What to Do if Your Blood Sugar is Low   AMBULATORY CARE:   Low blood sugar levels  (hypoglycemia) can happen with Type 1 and Type 2 diabetes  Low levels are more likely to happen if you use insulin  Hypoglycemia can cause you to have falls, accidents, and injuries  A blood sugar level that gets too low can lead to seizures, coma, and death  Learn to recognize the symptoms early so you can get treatment quickly  When your blood sugar is low you may feel:  • Sweaty    • Nervous or shaky    • Anxious or irritable    • Confused    • A fast, pounding heartbeat    • Extremely hungry    Have someone call your local emergency number (911 in the [address] [facility]) if:   • You cannot be woken  • You have a seizure  Call your doctor if:   • You have symptoms of a low blood sugar level, such as trouble thinking, sweating, or a pounding heartbeat  • Your blood sugar level is lower than normal and it does not improve with treatment  • You often have lower blood sugar levels than your target goals  • You have trouble coping with your illness, or you feel anxious or depressed  • You have questions or concerns about your condition or care  What to do if you have symptoms of low blood sugar:   • Check your blood sugar level, if possible  Your blood sugar level is too low if it is at or below 70 mg/dL  • Eat or drink 15 grams of fast-acting carbohydrate  Fast-acting carbohydrates will raise your blood sugar level quickly   Examples of 15 grams of fast-acting carbohydrates:     ? 4 ounces (½ cup) of fruit juice ? 4 ounces of regular soda    ? 2 tablespoons of raisins     ? 1 tube of glucose gel or 3 to 4 glucose tablets       • Check your blood sugar level 15 minutes later  If the level is still low (less than 100 mg/dL), eat another 15 grams of carbohydrate  When the level returns to 100 mg/dL, eat a snack or meal that contains carbohydrates  This will help prevent another drop in blood sugar  • Teach people close to you how to use your glucagon kit  Your blood sugar may be too low for you to be awake  People need to know when and how to use your kit  Prevent low blood sugar levels:  Prevent low blood sugar by knowing what increases your risk  Ask your healthcare provider for ways to prevent low blood sugar levels  Any of the following can increase your risk of low blood sugar:  • Fasting for tests or procedures    • During or after intense exercise    • Late or postponed meals    • Sleeping (you may need a bedtime snack)     • Drinking alcohol if you use insulin or insulin releasing pills    Follow up with your doctor as directed:  Write down your questions so you remember to ask them during your visits  © Copyright Fransiscorohini Mathis 2022 Information is for End User's use only and may not be sold, redistributed or otherwise used for commercial purposes  The above information is an  only  It is not intended as medical advice for individual conditions or treatments  Talk to your doctor, nurse or pharmacist before following any medical regimen to see if it is safe and effective for you

## 2023-06-20 ENCOUNTER — TELEPHONE (OUTPATIENT)
Dept: FAMILY MEDICINE CLINIC | Facility: CLINIC | Age: 58
End: 2023-06-20

## 2023-06-20 NOTE — TELEPHONE ENCOUNTER
6/20/23-called spoke to pt. Informed him that his appt was canceled for 11/14/23 due to [facility] resigning, Pt. Stated he will think about whom he wants to select and call us back.

## 2023-09-08 DIAGNOSIS — N52.1 ERECTILE DYSFUNCTION DUE TO DIABETES MELLITUS: ICD-10-CM

## 2023-09-08 DIAGNOSIS — E11.69 ERECTILE DYSFUNCTION DUE TO DIABETES MELLITUS: ICD-10-CM

## 2023-09-08 RX ORDER — TADALAFIL 20 MG/1
TABLET ORAL
Qty: 13 TABLET | Refills: 3 | OUTPATIENT
Start: 2023-09-08

## 2023-09-11 ENCOUNTER — TELEPHONE (OUTPATIENT)
Dept: FAMILY MEDICINE CLINIC | Facility: CLINIC | Age: 58
End: 2023-09-11

## 2023-10-13 ENCOUNTER — TELEPHONE (OUTPATIENT)
Dept: FAMILY MEDICINE CLINIC | Facility: CLINIC | Age: 58
End: 2023-10-13

## 2023-11-14 ENCOUNTER — OFFICE VISIT (OUTPATIENT)
Dept: FAMILY MEDICINE CLINIC | Facility: CLINIC | Age: 58
End: 2023-11-14
Payer: COMMERCIAL

## 2023-11-14 VITALS
SYSTOLIC BLOOD PRESSURE: 136 MMHG | HEIGHT: 70 IN | TEMPERATURE: 98 F | RESPIRATION RATE: 17 BRPM | BODY MASS INDEX: 28.63 KG/M2 | DIASTOLIC BLOOD PRESSURE: 78 MMHG | WEIGHT: 200 LBS | HEART RATE: 52 BPM | OXYGEN SATURATION: 97 %

## 2023-11-14 DIAGNOSIS — N52.1 ERECTILE DYSFUNCTION DUE TO DIABETES MELLITUS: ICD-10-CM

## 2023-11-14 DIAGNOSIS — I10 ESSENTIAL HYPERTENSION: Primary | ICD-10-CM

## 2023-11-14 DIAGNOSIS — E11.69 DIABETES MELLITUS TYPE 2 IN OBESE: ICD-10-CM

## 2023-11-14 DIAGNOSIS — Z12.5 PROSTATE CANCER SCREENING: ICD-10-CM

## 2023-11-14 DIAGNOSIS — E78.5 DYSLIPIDEMIA: ICD-10-CM

## 2023-11-14 DIAGNOSIS — E11.69 ERECTILE DYSFUNCTION DUE TO DIABETES MELLITUS: ICD-10-CM

## 2023-11-14 DIAGNOSIS — E66.9 DIABETES MELLITUS TYPE 2 IN OBESE: ICD-10-CM

## 2023-11-14 DIAGNOSIS — K21.9 GASTROESOPHAGEAL REFLUX DISEASE WITHOUT ESOPHAGITIS: ICD-10-CM

## 2023-11-14 PROCEDURE — 99214 OFFICE O/P EST MOD 30 MIN: CPT | Performed by: NURSE PRACTITIONER

## 2023-11-14 RX ORDER — LISINOPRIL 10 MG/1
10 TABLET ORAL DAILY
Qty: 90 TABLET | Refills: 1 | Status: SHIPPED | OUTPATIENT
Start: 2023-11-14 | End: 2024-05-12

## 2023-11-14 RX ORDER — DIPHENHYDRAMINE HYDROCHLORIDE 25 MG/1
25 CAPSULE ORAL
COMMUNITY
Start: 2023-10-23 | End: 2023-11-14

## 2023-11-14 RX ORDER — CYCLOBENZAPRINE HCL 5 MG
5 TABLET ORAL
COMMUNITY
Start: 2023-11-08 | End: 2023-11-14

## 2023-11-14 RX ORDER — PREDNISONE 10 MG/1
TABLET ORAL
COMMUNITY
Start: 2023-10-23 | End: 2023-11-14

## 2023-11-14 RX ORDER — ATORVASTATIN CALCIUM 10 MG/1
10 TABLET, FILM COATED ORAL DAILY
Qty: 90 TABLET | Refills: 1 | Status: SHIPPED | OUTPATIENT
Start: 2023-11-14

## 2023-11-14 RX ORDER — OMEPRAZOLE 20 MG/1
20 CAPSULE, DELAYED RELEASE ORAL DAILY
Qty: 90 CAPSULE | Refills: 1 | Status: SHIPPED | OUTPATIENT
Start: 2023-11-14 | End: 2024-05-12

## 2023-11-14 RX ORDER — TADALAFIL 20 MG/1
20 TABLET ORAL DAILY PRN
Qty: 13 TABLET | Refills: 3 | Status: SHIPPED | OUTPATIENT
Start: 2023-11-14 | End: 2024-02-12

## 2023-11-14 RX ORDER — GLIMEPIRIDE 4 MG/1
4 TABLET ORAL
Qty: 90 TABLET | Refills: 1 | Status: SHIPPED | OUTPATIENT
Start: 2023-11-14 | End: 2024-05-12

## 2023-11-14 RX ORDER — PIOGLITAZONEHYDROCHLORIDE 15 MG/1
15 TABLET ORAL DAILY
Qty: 90 TABLET | Refills: 1 | Status: SHIPPED | OUTPATIENT
Start: 2023-11-14 | End: 2024-05-12

## 2023-11-14 NOTE — PROGRESS NOTES
Assessment/Plan:    1. Essential hypertension  -     lisinopril (ZESTRIL) 10 mg tablet; Take 1 tablet (10 mg total) by mouth daily  -     Comprehensive metabolic panel; Future; Expected date: 11/14/2023  -     Hemoglobin A1C; Future; Expected date: 11/14/2023    2. Erectile dysfunction due to diabetes mellitus   -     tadalafil (CIALIS) 20 MG tablet; Take 1 tablet (20 mg total) by mouth daily as needed for erectile dysfunction    3. Diabetes mellitus type 2 in obese   -     glimepiride (AMARYL) 4 mg tablet; Take 1 tablet (4 mg total) by mouth daily with breakfast  -     pioglitazone (ACTOS) 15 mg tablet; Take 1 tablet (15 mg total) by mouth daily  -     atorvastatin (LIPITOR) 10 mg tablet; Take 1 tablet (10 mg total) by mouth daily  -     Albumin / creatinine urine ratio; Future; Expected date: 11/14/2023  -     Comprehensive metabolic panel; Future; Expected date: 11/14/2023  -     Hemoglobin A1C; Future; Expected date: 11/14/2023  -     CBC and differential; Future; Expected date: 11/14/2023  -     Lipid Panel with Direct LDL reflex; Future; Expected date: 11/14/2023    4. Gastroesophageal reflux disease without esophagitis  -     omeprazole (PriLOSEC) 20 mg delayed release capsule; Take 1 capsule (20 mg total) by mouth daily    5. Dyslipidemia  -     Lipid Panel with Direct LDL reflex; Future; Expected date: 11/14/2023    6. Prostate cancer screening  -     PSA, Total Screen; Future        The case discussed with patient using patient centered shared decision making. The patient was counseled regarding instructions for management,-- risk factor reductions,-- prognosis,-- impressions,-- risks and benefits of treatment options,-- importance of compliance with treatment. I have reviewed the instructions with the patient, answering all questions to his satisfaction.     Blood pressure controlled today  Advised that he is due for blood work however would like to wait 2 more weeks since he recently had a cortisone shot-this will falsely elevate his A1c. Would like to get an accurate reading. Treatment plan to be determined once I receive his lab results  We did discuss the consequence of medical noncompliance related to diabetes, dyslipidemia, hypertension specifically relating to CKD, PVD, CAD and consequential stroke, myocardial infarction, kidney injury, disability, etc..  Patient verbalized understanding of the same and advised that he will try to do better. Return to office in 3 months for recheck    Depression Screening and Follow-up Plan: Patient was screened for depression during today's encounter. They screened negative with a PHQ-2 score of 0. Return in 3 months (on 2/14/2024) for Diabetes Follow-up. I have spent a total time of 30 minutes on 11/14/23 in caring for this patient including Diagnostic results, Prognosis, Risks and benefits of tx options, Instructions for management, Patient and family education, Importance of tx compliance, Risk factor reductions, Impressions, Counseling / Coordination of care, Documenting in the medical record, Reviewing / ordering tests, medicine, procedures  , and Obtaining or reviewing history  . Subjective:      Patient ID: Lelia Monahan is a 63 y.o. male. 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  Changes to medical record changed where appropriate. Reviewed medications. Patient taking as prescribed. Tolerating well. Denies Side effects. Reviewed recent visits with specialists co-managing chronic conditions. The following portions of the patient's history were reviewed and updated as appropriate: allergies, current medications, past family history, past medical history, past social history, past surgical history and problem list.    Review of Systems   Constitutional:  Negative for fatigue, fever and unexpected weight change. HENT:  Negative for trouble swallowing. Respiratory:  Negative for cough and shortness of breath. Cardiovascular:  Negative for chest pain, palpitations and leg swelling. Gastrointestinal:  Negative for abdominal pain and blood in stool. Endocrine: Negative. Genitourinary:  Negative for decreased urine volume, difficulty urinating and hematuria. Musculoskeletal:  Positive for arthralgias and back pain. Negative for gait problem. Skin:  Negative for pallor. Neurological:  Negative for dizziness, syncope and weakness. Hematological:  Does not bruise/bleed easily. Psychiatric/Behavioral:  Negative for confusion.           Current Outpatient Medications   Medication Sig Dispense Refill    atorvastatin (LIPITOR) 10 mg tablet Take 1 tablet (10 mg total) by mouth daily 90 tablet 1    Empagliflozin (Jardiance) 25 MG TABS Take 1 tablet (25 mg total) by mouth every morning 90 tablet 3    glimepiride (AMARYL) 4 mg tablet Take 1 tablet (4 mg total) by mouth daily with breakfast 90 tablet 1    Glucose Blood (BLOOD GLUCOSE TEST STRIPS) STRP Check sugar bid 100 strip 4    lisinopril (ZESTRIL) 10 mg tablet Take 1 tablet (10 mg total) by mouth daily 90 tablet 1    omeprazole (PriLOSEC) 20 mg delayed release capsule Take 1 capsule (20 mg total) by mouth daily 90 capsule 1    pioglitazone (ACTOS) 15 mg tablet Take 1 tablet (15 mg total) by mouth daily 90 tablet 1    tadalafil (CIALIS) 20 MG tablet Take 1 tablet (20 mg total) by mouth daily as needed for erectile dysfunction 13 tablet 3     No current facility-administered medications for this visit. Patient Active Problem List   Diagnosis    Diabetes mellitus type 2 in obese     Essential hypertension    Erectile dysfunction due to diabetes mellitus     Overweight with body mass index (BMI) of 29 to 29.9 in adult    Type 2 diabetes mellitus with hyperglycemia (HCC)    Thoracic aortic aneurysm without rupture ([address])    Dizziness    Annual physical exam    Gastroesophageal reflux disease without esophagitis    Dyslipidemia    Pain in gums    Body aches    Telehealth encounter for confirmed COVID-19    BMI 28.0-28.9,adult    Numbness of toes    Foot pain, left    Popliteal pain       Objective:    /78   Pulse (!) 52   Temp 98 °F (36.7 °C) (Temporal)   Resp 17   Ht 5' 10" (1.778 m)   Wt 90.7 kg (200 lb)   SpO2 97%   BMI 28.70 kg/m²        Physical Exam  Vitals and nursing note reviewed. Constitutional:       General: He is not in acute distress. Appearance: Normal appearance. HENT:      Head: Normocephalic and atraumatic. Neck:      Vascular: No carotid bruit. Cardiovascular:      Rate and Rhythm: Normal rate and regular rhythm. Pulses: Normal pulses. Heart sounds: Normal heart sounds. Pulmonary:      Effort: Pulmonary effort is normal.      Breath sounds: Normal breath sounds. No wheezing or rales. Abdominal:      General: Bowel sounds are normal.      Palpations: Abdomen is soft. Tenderness: There is no abdominal tenderness. Musculoskeletal:      Right lower leg: No edema. Left lower leg: No edema. Lymphadenopathy:      Cervical: No cervical adenopathy. Skin:     General: Skin is warm and dry. Coloration: Skin is not pale. Neurological:      General: No focal deficit present. Mental Status: He is alert. Motor: No weakness.       Gait: Gait normal.   Psychiatric:         Mood and Affect: Mood normal.                Quan Etienne, [address] [place] [address]

## 2023-11-27 ENCOUNTER — APPOINTMENT (OUTPATIENT)
Dept: LAB | Facility: CLINIC | Age: 58
End: 2023-11-27
Payer: OTHER MISCELLANEOUS

## 2023-11-27 ENCOUNTER — OFFICE VISIT (OUTPATIENT)
Dept: LAB | Facility: CLINIC | Age: 58
End: 2023-11-27
Payer: OTHER MISCELLANEOUS

## 2023-11-27 DIAGNOSIS — Z01.811 PRE-OPERATIVE RESPIRATORY EXAMINATION: ICD-10-CM

## 2023-11-27 DIAGNOSIS — Z01.810 PRE-OPERATIVE CARDIOVASCULAR EXAMINATION: ICD-10-CM

## 2023-11-27 DIAGNOSIS — M48.061 LUMBAR STENOSIS WITHOUT NEUROGENIC CLAUDICATION: ICD-10-CM

## 2023-11-27 DIAGNOSIS — Z01.812 PRE-OPERATIVE LABORATORY EXAMINATION: ICD-10-CM

## 2023-11-27 DIAGNOSIS — M54.16 LUMBAR RADICULOPATHY: ICD-10-CM

## 2023-11-27 LAB
ALBUMIN SERPL BCP-MCNC: 4.2 G/DL (ref 3.5–5)
ALP SERPL-CCNC: 91 U/L (ref 34–104)
ALT SERPL W P-5'-P-CCNC: 18 U/L (ref 7–52)
ANION GAP SERPL CALCULATED.3IONS-SCNC: 9 MMOL/L
APTT PPP: 28 SECONDS (ref 23–37)
AST SERPL W P-5'-P-CCNC: 15 U/L (ref 13–39)
BASOPHILS # BLD AUTO: 0.06 THOUSANDS/ÂΜL (ref 0–0.1)
BASOPHILS NFR BLD AUTO: 1 % (ref 0–1)
BILIRUB SERPL-MCNC: 0.65 MG/DL (ref 0.2–1)
BUN SERPL-MCNC: 15 MG/DL (ref 5–25)
CALCIUM SERPL-MCNC: 9.5 MG/DL (ref 8.4–10.2)
CHLORIDE SERPL-SCNC: 100 MMOL/L (ref 96–108)
CO2 SERPL-SCNC: 28 MMOL/L (ref 21–32)
CREAT SERPL-MCNC: 0.7 MG/DL (ref 0.6–1.3)
EOSINOPHIL # BLD AUTO: 0.27 THOUSAND/ÂΜL (ref 0–0.61)
EOSINOPHIL NFR BLD AUTO: 4 % (ref 0–6)
ERYTHROCYTE [DISTWIDTH] IN BLOOD BY AUTOMATED COUNT: 13.8 % (ref 11.6–15.1)
EST. AVERAGE GLUCOSE BLD GHB EST-MCNC: 329 MG/DL
GFR SERPL CREATININE-BSD FRML MDRD: 104 ML/MIN/1.73SQ M
GLUCOSE P FAST SERPL-MCNC: 260 MG/DL (ref 65–99)
HBA1C MFR BLD: 13.1 %
HBV CORE AB SER QL: NORMAL
HBV SURFACE AB SER-ACNC: 3.68 MIU/ML
HCT VFR BLD AUTO: 45.8 % (ref 36.5–49.3)
HGB BLD-MCNC: 14.3 G/DL (ref 12–17)
HIV 1+2 AB+HIV1 P24 AG SERPL QL IA: NORMAL
HIV 2 AB SERPL QL IA: NORMAL
HIV1 AB SERPL QL IA: NORMAL
HIV1 P24 AG SERPL QL IA: NORMAL
IMM GRANULOCYTES # BLD AUTO: 0.03 THOUSAND/UL (ref 0–0.2)
IMM GRANULOCYTES NFR BLD AUTO: 1 % (ref 0–2)
INR PPP: 0.97 (ref 0.84–1.19)
LYMPHOCYTES # BLD AUTO: 2.33 THOUSANDS/ÂΜL (ref 0.6–4.47)
LYMPHOCYTES NFR BLD AUTO: 36 % (ref 14–44)
MCH RBC QN AUTO: 24.1 PG (ref 26.8–34.3)
MCHC RBC AUTO-ENTMCNC: 31.2 G/DL (ref 31.4–37.4)
MCV RBC AUTO: 77 FL (ref 82–98)
MONOCYTES # BLD AUTO: 0.49 THOUSAND/ÂΜL (ref 0.17–1.22)
MONOCYTES NFR BLD AUTO: 8 % (ref 4–12)
NEUTROPHILS # BLD AUTO: 3.22 THOUSANDS/ÂΜL (ref 1.85–7.62)
NEUTS SEG NFR BLD AUTO: 50 % (ref 43–75)
NRBC BLD AUTO-RTO: 0 /100 WBCS
PLATELET # BLD AUTO: 291 THOUSANDS/UL (ref 149–390)
PMV BLD AUTO: 10.8 FL (ref 8.9–12.7)
POTASSIUM SERPL-SCNC: 4.3 MMOL/L (ref 3.5–5.3)
PROT SERPL-MCNC: 7.5 G/DL (ref 6.4–8.4)
PROTHROMBIN TIME: 13.5 SECONDS (ref 11.6–14.5)
RBC # BLD AUTO: 5.94 MILLION/UL (ref 3.88–5.62)
SODIUM SERPL-SCNC: 137 MMOL/L (ref 135–147)
WBC # BLD AUTO: 6.4 THOUSAND/UL (ref 4.31–10.16)

## 2023-11-27 PROCEDURE — 80053 COMPREHEN METABOLIC PANEL: CPT

## 2023-11-27 PROCEDURE — 83036 HEMOGLOBIN GLYCOSYLATED A1C: CPT

## 2023-11-27 PROCEDURE — 86704 HEP B CORE ANTIBODY TOTAL: CPT

## 2023-11-27 PROCEDURE — 87389 HIV-1 AG W/HIV-1&-2 AB AG IA: CPT

## 2023-11-27 PROCEDURE — 86706 HEP B SURFACE ANTIBODY: CPT

## 2023-11-27 PROCEDURE — 85610 PROTHROMBIN TIME: CPT

## 2023-11-27 PROCEDURE — 36415 COLL VENOUS BLD VENIPUNCTURE: CPT

## 2023-11-27 PROCEDURE — 93005 ELECTROCARDIOGRAM TRACING: CPT

## 2023-11-27 PROCEDURE — 85025 COMPLETE CBC W/AUTO DIFF WBC: CPT

## 2023-11-27 PROCEDURE — 85730 THROMBOPLASTIN TIME PARTIAL: CPT

## 2023-11-29 LAB
ATRIAL RATE: 70 BPM
P AXIS: -29 DEGREES
PR INTERVAL: 144 MS
QRS AXIS: -9 DEGREES
QRSD INTERVAL: 84 MS
QT INTERVAL: 410 MS
QTC INTERVAL: 442 MS
T WAVE AXIS: 53 DEGREES
VENTRICULAR RATE: 70 BPM

## 2023-11-30 ENCOUNTER — HOSPITAL ENCOUNTER (OUTPATIENT)
Dept: RADIOLOGY | Facility: HOSPITAL | Age: 58
Discharge: HOME | End: 2023-11-30
Payer: OTHER MISCELLANEOUS

## 2023-11-30 DIAGNOSIS — M48.061 SPINAL STENOSIS, LUMBAR REGION, WITHOUT NEUROGENIC CLAUDICATION: ICD-10-CM

## 2023-11-30 DIAGNOSIS — M54.16 LUMBAR RADICULOPATHY: ICD-10-CM

## 2023-11-30 DIAGNOSIS — Z01.811 PRE-OP CHEST EXAM: ICD-10-CM

## 2023-11-30 PROCEDURE — 71046 X-RAY EXAM CHEST 2 VIEWS: CPT

## 2023-12-06 ENCOUNTER — APPOINTMENT (OUTPATIENT)
Dept: LAB | Facility: CLINIC | Age: 58
End: 2023-12-06
Payer: COMMERCIAL

## 2023-12-06 DIAGNOSIS — E66.9 DIABETES MELLITUS TYPE 2 IN OBESE: ICD-10-CM

## 2023-12-06 DIAGNOSIS — Z12.5 ENCOUNTER FOR PROSTATE CANCER SCREENING: ICD-10-CM

## 2023-12-06 DIAGNOSIS — I10 ESSENTIAL HYPERTENSION: ICD-10-CM

## 2023-12-06 DIAGNOSIS — Z12.5 PROSTATE CANCER SCREENING: ICD-10-CM

## 2023-12-06 DIAGNOSIS — E78.5 DYSLIPIDEMIA: ICD-10-CM

## 2023-12-06 DIAGNOSIS — E11.69 DIABETES MELLITUS TYPE 2 IN OBESE: ICD-10-CM

## 2023-12-06 LAB
ALBUMIN SERPL BCP-MCNC: 4.5 G/DL (ref 3.5–5)
ALP SERPL-CCNC: 81 U/L (ref 34–104)
ALT SERPL W P-5'-P-CCNC: 23 U/L (ref 7–52)
ANION GAP SERPL CALCULATED.3IONS-SCNC: 13 MMOL/L
AST SERPL W P-5'-P-CCNC: 19 U/L (ref 13–39)
BASOPHILS # BLD AUTO: 0.08 THOUSANDS/ÂΜL (ref 0–0.1)
BASOPHILS NFR BLD AUTO: 1 % (ref 0–1)
BILIRUB SERPL-MCNC: 0.75 MG/DL (ref 0.2–1)
BUN SERPL-MCNC: 16 MG/DL (ref 5–25)
CALCIUM SERPL-MCNC: 9.7 MG/DL (ref 8.4–10.2)
CHLORIDE SERPL-SCNC: 97 MMOL/L (ref 96–108)
CHOLEST SERPL-MCNC: 175 MG/DL
CO2 SERPL-SCNC: 24 MMOL/L (ref 21–32)
CREAT SERPL-MCNC: 0.72 MG/DL (ref 0.6–1.3)
CREAT UR-MCNC: 74.9 MG/DL
EOSINOPHIL # BLD AUTO: 0.24 THOUSAND/ÂΜL (ref 0–0.61)
EOSINOPHIL NFR BLD AUTO: 3 % (ref 0–6)
ERYTHROCYTE [DISTWIDTH] IN BLOOD BY AUTOMATED COUNT: 15.4 % (ref 11.6–15.1)
EST. AVERAGE GLUCOSE BLD GHB EST-MCNC: 329 MG/DL
GFR SERPL CREATININE-BSD FRML MDRD: 103 ML/MIN/1.73SQ M
GLUCOSE P FAST SERPL-MCNC: 113 MG/DL (ref 65–99)
HBA1C MFR BLD: 13.1 %
HCT VFR BLD AUTO: 51.5 % (ref 36.5–49.3)
HDLC SERPL-MCNC: 42 MG/DL
HGB BLD-MCNC: 15.9 G/DL (ref 12–17)
IMM GRANULOCYTES # BLD AUTO: 0.07 THOUSAND/UL (ref 0–0.2)
IMM GRANULOCYTES NFR BLD AUTO: 1 % (ref 0–2)
LDLC SERPL CALC-MCNC: 108 MG/DL (ref 0–100)
LYMPHOCYTES # BLD AUTO: 2.44 THOUSANDS/ÂΜL (ref 0.6–4.47)
LYMPHOCYTES NFR BLD AUTO: 30 % (ref 14–44)
MCH RBC QN AUTO: 24.2 PG (ref 26.8–34.3)
MCHC RBC AUTO-ENTMCNC: 30.9 G/DL (ref 31.4–37.4)
MCV RBC AUTO: 79 FL (ref 82–98)
MICROALBUMIN UR-MCNC: 11.8 MG/L
MICROALBUMIN/CREAT 24H UR: 16 MG/G CREATININE (ref 0–30)
MONOCYTES # BLD AUTO: 0.48 THOUSAND/ÂΜL (ref 0.17–1.22)
MONOCYTES NFR BLD AUTO: 6 % (ref 4–12)
NEUTROPHILS # BLD AUTO: 4.83 THOUSANDS/ÂΜL (ref 1.85–7.62)
NEUTS SEG NFR BLD AUTO: 59 % (ref 43–75)
NRBC BLD AUTO-RTO: 0 /100 WBCS
PLATELET # BLD AUTO: 294 THOUSANDS/UL (ref 149–390)
PMV BLD AUTO: 10.8 FL (ref 8.9–12.7)
POTASSIUM SERPL-SCNC: 4.4 MMOL/L (ref 3.5–5.3)
PROT SERPL-MCNC: 8.5 G/DL (ref 6.4–8.4)
PSA SERPL-MCNC: 0.64 NG/ML (ref 0–4)
RBC # BLD AUTO: 6.56 MILLION/UL (ref 3.88–5.62)
SODIUM SERPL-SCNC: 134 MMOL/L (ref 135–147)
TRIGL SERPL-MCNC: 124 MG/DL
TSH SERPL DL<=0.05 MIU/L-ACNC: 1.57 UIU/ML (ref 0.45–4.5)
WBC # BLD AUTO: 8.14 THOUSAND/UL (ref 4.31–10.16)

## 2023-12-06 PROCEDURE — 80061 LIPID PANEL: CPT

## 2023-12-06 PROCEDURE — 84443 ASSAY THYROID STIM HORMONE: CPT

## 2023-12-06 PROCEDURE — 80053 COMPREHEN METABOLIC PANEL: CPT

## 2023-12-06 PROCEDURE — 82570 ASSAY OF URINE CREATININE: CPT

## 2023-12-06 PROCEDURE — G0103 PSA SCREENING: HCPCS

## 2023-12-06 PROCEDURE — 85025 COMPLETE CBC W/AUTO DIFF WBC: CPT

## 2023-12-06 PROCEDURE — 82043 UR ALBUMIN QUANTITATIVE: CPT

## 2023-12-06 PROCEDURE — 36415 COLL VENOUS BLD VENIPUNCTURE: CPT

## 2023-12-06 PROCEDURE — 83036 HEMOGLOBIN GLYCOSYLATED A1C: CPT

## 2023-12-14 ENCOUNTER — CONSULT (OUTPATIENT)
Dept: FAMILY MEDICINE CLINIC | Facility: CLINIC | Age: 58
End: 2023-12-14
Payer: COMMERCIAL

## 2023-12-14 VITALS
HEIGHT: 70 IN | WEIGHT: 194 LBS | BODY MASS INDEX: 27.77 KG/M2 | DIASTOLIC BLOOD PRESSURE: 80 MMHG | TEMPERATURE: 97.1 F | HEART RATE: 80 BPM | OXYGEN SATURATION: 95 % | SYSTOLIC BLOOD PRESSURE: 128 MMHG | RESPIRATION RATE: 16 BRPM

## 2023-12-14 DIAGNOSIS — E11.65 UNCONTROLLED TYPE 2 DIABETES MELLITUS WITH HYPERGLYCEMIA (HCC): Primary | ICD-10-CM

## 2023-12-14 DIAGNOSIS — M54.16 LUMBAR RADICULOPATHY: ICD-10-CM

## 2023-12-14 PROCEDURE — 99213 OFFICE O/P EST LOW 20 MIN: CPT | Performed by: NURSE PRACTITIONER

## 2023-12-14 NOTE — PROGRESS NOTES
Assessment/Plan:    1. Uncontrolled type 2 diabetes mellitus with hyperglycemia (HCC)  Comments:  a1c 13    2. Lumbar radiculopathy  Comments:  planned Lumbar Decompression Dr Nery Kong to be scheduled when A1c <8    The case discussed with patient using patient centered shared decision making. The patient was counseled regarding instructions for management,-- risk factor reductions,-- prognosis,-- impressions,-- risks and benefits of treatment options,-- importance of compliance with treatment. I have reviewed the instructions with the patient, answering all questions to his satisfaction. Had a lengthy discussion with patient concerning the measures that need to be taken to bring his A1c to 8 as required by the neurosurgeon. His A1c at present is 13. Apparently his diet is poor and he is not taking his medication. He will take his medications as prescribed and he will follow the diet plan as we discussed in detail today. He will return in 4 weeks for a recheck of his A1c. The plan is for a lumbar decompression once his A1c is 8.0 or less. There are no Patient Instructions on file for this visit. Return in about 4 weeks (around 1/11/2024). Subjective:      Patient ID: Eboni Guzman is a 63 y.o. male. Chief Complaint   Patient presents with    Pre-op Exam       50-year-old presents for a preop clearance for lumbar decompression to be performed by Dr. Nery Kong however he was informed by the preop staff that the surgery will not be performed unless his A1c is less than 8.0    A1c as of last week was 13. Speaking with Trish Palafox today he admits to dietary indiscretion-diet is very poor. He eats fast food often. He does not take his medication.   But he is now very motivated to get the surgery and will be very compliant    Recent Results (from the past 672 hour(s))  -Urinalysis with microscopic:   Collection Time: 11/27/23  3:17 PM       Result                      Value             Ref Range Color, UA                   Yellow                                Clarity, UA                 Clear                                 Specific Gravity, UA        1.047 (H)         1.003 - 1.030       pH, UA                      5.5               4.5, 5.0, 5.*       Leukocytes, UA                                Negative        Elevated glucose may cause decreased leukocyte values.  See urine microscopic for UWBC result (A)       Nitrite, UA                 Negative          Negative            Protein, UA                 30 (1+) (A)       Negative mg/*       Glucose, UA                                   Negative mg/*   >=1000 (1%) (A)       Ketones, UA                 Trace (A)         Negative mg/*       Urobilinogen, UA            <2.0              <2.0 mg/dl m*       Bilirubin, UA               Negative          Negative            Occult Blood, UA            Negative          Negative            RBC, UA                     1-2               None Seen, 1*       WBC, UA                     None Seen         None Seen, 1*       Epithelial Cells            None Seen         None Seen, O*       Bacteria, UA                None Seen         None Seen, O*       MUCUS THREADS               Moderate (A)      None Seen      -CBC and differential:   Collection Time: 11/27/23  3:17 PM       Result                      Value             Ref Range           WBC                         6.40              4.31 - 10.16*       RBC                         5.94 (H)          3.88 - 5.62 *       Hemoglobin                  14.3              12.0 - 17.0 *       Hematocrit                  45.8              36.5 - 49.3 %       MCV                         77 (L)            82 - 98 fL          MCH                         24.1 (L)          26.8 - 34.3 *       MCHC                        31.2 (L)          31.4 - 37.4 *       RDW                         13.8              11.6 - 15.1 %       MPV                         10.8              8.9 - 12.7 fL       Platelets                   291               149 - 390 Th*       nRBC                        0                 /100 WBCs           Neutrophils Relative        50                43 - 75 %           Immat GRANS %               1                 0 - 2 %             Lymphocytes Relative        36                14 - 44 %           Monocytes Relative          8                 4 - 12 %            Eosinophils Relative        4                 0 - 6 %             Basophils Relative          1                 0 - 1 %             Neutrophils Absolute        3.22              1.85 - 7.62 *       Immature Grans Absolute     0.03              0.00 - 0.20 *       Lymphocytes Absolute        2.33              0.60 - 4.47 *       Monocytes Absolute          0.49              0.17 - 1.22 *       Eosinophils Absolute        0.27              0.00 - 0.61 *       Basophils Absolute          0.06              0.00 - 0.10 *  -Protime-INR:   Collection Time: 11/27/23  3:17 PM       Result                      Value             Ref Range           Protime                     13.5              11.6 - 14.5 *       INR                         0.97              0.84 - 1.19    -APTT:   Collection Time: 11/27/23  3:17 PM       Result                      Value             Ref Range           PTT                         28                23 - 37 seco*  -Hemoglobin A1C:   Collection Time: 11/27/23  3:17 PM       Result                      Value             Ref Range           Hemoglobin A1C              13.1 (H)          Normal 4.0-5*       EAG                         329               mg/dl          -[facility]2 [facility] for 2 yr old and above:   Collection Time: 11/27/23  3:17 PM       Result                      Value             Ref Range           HIV-1 p24 Antigen           Non-Reactive      Non-Reactive        HIV-1 Antibody              Non-Reactive      Non-Reactive        HIV-2 Antibody              [facility] Non-Reactive        HIV Ag-Ab 5th Gen           Non-Reactive      Non-Reactive   -Hepatitis B core antibody, total:   Collection Time: 11/27/23  3:17 PM       Result                      Value             Ref Range           Hep B Core Total Ab         Non-reactive      Non-Reactive   -Hepatitis B surface antibody:   Collection Time: 11/27/23  3:17 PM       Result                      Value             Ref Range           Hep B S Ab                  3.68              3-500 mIU/mL*  -Comprehensive metabolic panel:   Collection Time: 11/27/23  3:17 PM       Result                      Value             Ref Range           Sodium                      137               135 - 147 mm*       Potassium                   4.3               3.5 - 5.3 mm*       Chloride                    100               96 - 108 mmo*       CO2                         28                21 - 32 mmol*       ANION GAP                   9                 mmol/L              BUN                         15                5 - 25 mg/dL        Creatinine                  0.70              0.60 - 1.30 *       Glucose, Fasting            260 (H)           65 - 99 mg/dL       Calcium                     9.5               8.4 - 10.2 m*       AST                         15                13 - 39 U/L         ALT                         18                7 - 52 U/L          Alkaline Phosphatase        91                34 - 104 U/L        Total Protein               7.5               6.4 - 8.4 g/*       Albumin                     4.2               3.5 - 5.0 g/*       Total Bilirubin             0.65              0.20 - 1.00 *       eGFR                        104               ml/min/1.73s*  -ECG 12 lead:   Collection Time: 11/27/23  3:23 PM       Result                      Value             Ref Range           Ventricular Rate            70                BPM                 Atrial Rate                 70                BPM                 MT Interval 144               ms                  QRSD Interval               84                ms                  QT Interval                 410               ms                  QTC Interval                442               ms                  P Axis                      -29               degrees             QRS Axis                    -9                degrees             T Wave Axis                 53                degrees        -Albumin / creatinine urine ratio:   Collection Time: 12/06/23 12:43 PM       Result                      Value             Ref Range           Creatinine, Ur              74.9              Reference ra*       Albumin,U,Random            11.8              <20.0 mg/L          Albumin Creat Ratio         16                0 - 30 mg/g *  -Comprehensive metabolic panel:   Collection Time: 12/06/23 12:43 PM       Result                      Value             Ref Range           Sodium                      134 (L)           135 - 147 mm*       Potassium                   4.4               3.5 - 5.3 mm*       Chloride                    97                96 - 108 mmo*       CO2                         24                21 - 32 mmol*       ANION GAP                   13                mmol/L              BUN                         16                5 - 25 mg/dL        Creatinine                  0.72              0.60 - 1.30 *       Glucose, Fasting            113 (H)           65 - 99 mg/dL       Calcium                     9.7               8.4 - 10.2 m*       AST                         19                13 - 39 U/L         ALT                         23                7 - 52 U/L          Alkaline Phosphatase        81                34 - 104 U/L        Total Protein               8.5 (H)           6.4 - 8.4 g/*       Albumin                     4.5               3.5 - 5.0 g/*       Total Bilirubin             0.75              0.20 - 1.00 *       eGFR                        103 ml/min/1.73s*  -Hemoglobin A1C:   Collection Time: 12/06/23 12:43 PM       Result                      Value             Ref Range           Hemoglobin A1C              13.1 (H)          Normal 4.0-5*       EAG                         329               mg/dl          -CBC and differential:   Collection Time: 12/06/23 12:43 PM       Result                      Value             Ref Range           WBC                         8.14              4.31 - 10.16*       RBC                         6.56 (H)          3.88 - 5.62 *       Hemoglobin                  15.9              12.0 - 17.0 *       Hematocrit                  51.5 (H)          36.5 - 49.3 %       MCV                         79 (L)            82 - 98 fL          MCH                         24.2 (L)          26.8 - 34.3 *       MCHC                        30.9 (L)          31.4 - 37.4 *       RDW                         15.4 (H)          11.6 - 15.1 %       MPV                         10.8              8.9 - 12.7 fL       Platelets                   294               149 - 390 Th*       nRBC                        0                 /100 WBCs           Neutrophils Relative        59                43 - 75 %           Immat GRANS %               1                 0 - 2 %             Lymphocytes Relative        30                14 - 44 %           Monocytes Relative          6                 4 - 12 %            Eosinophils Relative        3                 0 - 6 %             Basophils Relative          1                 0 - 1 %             Neutrophils Absolute        4.83              1.85 - 7.62 *       Immature Grans Absolute     0.07              0.00 - 0.20 *       Lymphocytes Absolute        2.44              0.60 - 4.47 *       Monocytes Absolute          0.48              0.17 - 1.22 *       Eosinophils Absolute        0.24              0.00 - 0.61 *       Basophils Absolute          0.08              0.00 - 0.10 *  -Lipid Panel with Direct LDL reflex:   Collection Time: 12/06/23 12:43 PM       Result                      Value             Ref Range           Cholesterol                 175               See Comment *       Triglycerides               124               See Comment *       HDL, Direct                 42                >=40 mg/dL          LDL Calculated              108 (H)           0 - 100 mg/dL  -TSH, 3rd generation with Free T4 reflex:   Collection Time: 12/06/23 12:43 PM       Result                      Value             Ref Range           TSH 3RD GENERATON           1.569             0.450 - 4.50*  -PSA, Total Screen:   Collection Time: 12/06/23 12:43 PM       Result                      Value             Ref Range           PSA                         0.64              0.00 - 4.00 *            The following portions of the patient's history were reviewed and updated as appropriate: allergies, current medications, past family history, past medical history, past social history, past surgical history and problem list.    Review of Systems   Constitutional: Negative. Endocrine: Negative. Musculoskeletal:  Positive for arthralgias and back pain. Neurological: Negative.           Current Outpatient Medications   Medication Sig Dispense Refill    Empagliflozin (Jardiance) 25 MG TABS Take 1 tablet (25 mg total) by mouth every morning 90 tablet 3    glimepiride (AMARYL) 4 mg tablet Take 1 tablet (4 mg total) by mouth daily with breakfast 90 tablet 1    Glucose Blood (BLOOD GLUCOSE TEST STRIPS) STRP Check sugar bid 100 strip 4    lisinopril (ZESTRIL) 10 mg tablet Take 1 tablet (10 mg total) by mouth daily 90 tablet 1    omeprazole (PriLOSEC) 20 mg delayed release capsule Take 1 capsule (20 mg total) by mouth daily 90 capsule 1    pioglitazone (ACTOS) 15 mg tablet Take 1 tablet (15 mg total) by mouth daily 90 tablet 1    tadalafil (CIALIS) 20 MG tablet Take 1 tablet (20 mg total) by mouth daily as needed for erectile dysfunction 13 tablet 3    atorvastatin (LIPITOR) 10 mg tablet Take 1 tablet (10 mg total) by mouth daily (Patient not taking: Reported on 12/14/2023) 90 tablet 1     No current facility-administered medications for this visit. Objective:    /80 (BP Location: Left arm, Patient Position: Sitting, Cuff Size: Standard)   Pulse 80   Temp (!) 97.1 °F (36.2 °C) (Temporal)   Resp 16   Ht 5' 10" (1.778 m)   Wt 88 kg (194 lb)   SpO2 95%   BMI 27.84 kg/m²        Physical Exam  Vitals and nursing note reviewed. Constitutional:       General: He is not in acute distress. Appearance: Normal appearance. Cardiovascular:      Rate and Rhythm: Normal rate and regular rhythm. Pulmonary:      Effort: Pulmonary effort is normal.      Breath sounds: Normal breath sounds. Neurological:      General: No focal deficit present. Mental Status: He is alert. Mental status is at baseline.                 Antonietta Blanco, [address] [place] [address]

## 2024-01-02 ENCOUNTER — TELEPHONE (OUTPATIENT)
Dept: FAMILY MEDICINE CLINIC | Facility: CLINIC | Age: 59
End: 2024-01-02

## 2024-01-05 ENCOUNTER — RA CDI HCC (OUTPATIENT)
Dept: OTHER | Facility: HOSPITAL | Age: 59
End: 2024-01-05

## 2024-01-05 NOTE — PROGRESS NOTES
HCC coding opportunities          Chart Reviewed number of suggestions sent to Provider: 3     Patients Insurance        Commercial Insurance: [employer]     E11.65  E11.40  E11.69, N52.1

## 2024-01-12 ENCOUNTER — CONSULT (OUTPATIENT)
Dept: FAMILY MEDICINE CLINIC | Facility: CLINIC | Age: 59
End: 2024-01-12
Payer: COMMERCIAL

## 2024-01-12 VITALS
RESPIRATION RATE: 17 BRPM | SYSTOLIC BLOOD PRESSURE: 136 MMHG | HEART RATE: 81 BPM | HEIGHT: 70 IN | WEIGHT: 194 LBS | TEMPERATURE: 97.3 F | BODY MASS INDEX: 27.77 KG/M2 | OXYGEN SATURATION: 98 % | DIASTOLIC BLOOD PRESSURE: 78 MMHG

## 2024-01-12 DIAGNOSIS — M48.062 SPINAL STENOSIS OF LUMBAR REGION WITH NEUROGENIC CLAUDICATION: ICD-10-CM

## 2024-01-12 DIAGNOSIS — E11.69 DIABETES MELLITUS TYPE 2 IN OBESE: ICD-10-CM

## 2024-01-12 DIAGNOSIS — E66.9 DIABETES MELLITUS TYPE 2 IN OBESE: ICD-10-CM

## 2024-01-12 DIAGNOSIS — Z01.818 PRE-OPERATIVE CLEARANCE: Primary | ICD-10-CM

## 2024-01-12 DIAGNOSIS — E11.65 UNCONTROLLED TYPE 2 DIABETES MELLITUS WITH HYPERGLYCEMIA (HCC): ICD-10-CM

## 2024-01-12 DIAGNOSIS — M54.16 LUMBAR RADICULOPATHY: ICD-10-CM

## 2024-01-12 LAB — SL AMB POCT HEMOGLOBIN AIC: 8.9 (ref ?–6.5)

## 2024-01-12 PROCEDURE — 83036 HEMOGLOBIN GLYCOSYLATED A1C: CPT | Performed by: NURSE PRACTITIONER

## 2024-01-12 PROCEDURE — 99214 OFFICE O/P EST MOD 30 MIN: CPT | Performed by: NURSE PRACTITIONER

## 2024-01-12 RX ORDER — PIOGLITAZONEHYDROCHLORIDE 15 MG/1
15 TABLET ORAL DAILY
Qty: 90 TABLET | Refills: 1 | Status: SHIPPED | OUTPATIENT
Start: 2024-01-12 | End: 2024-07-10

## 2024-01-12 RX ORDER — GLIMEPIRIDE 4 MG/1
4 TABLET ORAL
Qty: 90 TABLET | Refills: 1 | Status: SHIPPED | OUTPATIENT
Start: 2024-01-12 | End: 2024-07-10

## 2024-01-12 NOTE — PROGRESS NOTES
[facility] PRE-OPERATIVE EVALUATION  [facility] PHYSICIAN GROUP - [facility] CARE [facility]    NAME: Paresh Mosquera  AGE: 58 y.o. SEX: male  : 1965     DATE: 2024    [facility] Pre-Operative Evaluation      Chief Complaint: Pre-operative Evaluation     Surgery: Lumbar Decompression Surgery  Anticipated Date of Surgery: To be scheduled  Surgeon: Dr. Gomes fax [phone]      History of Present Illness:     HPI    Anesthesia:  general  Bleeding Risk: no recent abnormal bleeding, no remote history of abnormal bleeding, and no use of Ca-channel blockers  Current Anti-platelet/anticoagulation medication:  none    Assessment of Cardiac Risk:  Denies unstable or severe angina or MI in the last 6 weeks or history of stent placement in the last year   Denies decompensated heart failure (e.g. New onset heart failure, NYHA functional class IV heart failure, or worsening existing heart failure)  Denies significant arrhythmias such as high grade AV block, symptomatic ventricular arrhythmia, newly recognized ventricular tachycardia, supraventricular tachycardia with resting heart rate >100, or symptomatic bradycardia  Denies severe heart valve disease including aortic stenosis or symptomatic mitral stenosis     Exercise Capacity:  Able to walk 4 blocks without symptoms?: Yes  Able to walk 2 flights without symptoms?: Yes    Prior Anesthesia Reactions: No     Personal history of venous thromboembolic disease? No    History of steroid use for >2 weeks within last year? No         Review of Systems:     Review of Systems   Constitutional:  Negative for fatigue, fever and unexpected weight change.   HENT:  Negative for trouble swallowing.    Respiratory:  Negative for cough and shortness of breath.    Cardiovascular:  Negative for chest pain, palpitations and leg swelling.   Gastrointestinal:  Negative for abdominal pain and blood in stool.   Endocrine: Negative.    Genitourinary:  Negative for decreased  urine volume, difficulty urinating and hematuria.   Musculoskeletal:  Positive for arthralgias and back pain. Negative for gait problem.   Skin:  Negative for pallor.   Neurological:  Negative for dizziness, syncope and weakness.   Hematological:  Does not bruise/bleed easily.   Psychiatric/Behavioral:  Negative for confusion.        Current Problem List:     Patient Active Problem List   Diagnosis    Diabetes mellitus type 2 in obese     Essential hypertension    Erectile dysfunction due to diabetes mellitus     Overweight with body mass index (BMI) of 29 to 29.9 in adult    Type 2 diabetes mellitus with hyperglycemia (HCC)    Thoracic aortic aneurysm without rupture (HCC)    Dizziness    Annual physical exam    Gastroesophageal reflux disease without esophagitis    Dyslipidemia    Pain in gums    Body aches    Telehealth encounter for confirmed COVID-19    BMI 28.0-28.9,adult    Numbness of toes    Foot pain, left    Popliteal pain       Allergies:     Allergies   Allergen Reactions    Penicillins Swelling    Metformin Diarrhea    Clindamycin Hives       Current Medications:       Current Outpatient Medications:     Empagliflozin (Jardiance) 25 MG TABS, Take 1 tablet (25 mg total) by mouth every morning, Disp: 90 tablet, Rfl: 3    glimepiride (AMARYL) 4 mg tablet, Take 1 tablet (4 mg total) by mouth daily with breakfast, Disp: 90 tablet, Rfl: 1    Glucose Blood (BLOOD GLUCOSE TEST STRIPS) STRP, Check sugar bid, Disp: 100 strip, Rfl: 4    lisinopril (ZESTRIL) 10 mg tablet, Take 1 tablet (10 mg total) by mouth daily, Disp: 90 tablet, Rfl: 1    omeprazole (PriLOSEC) 20 mg delayed release capsule, Take 1 capsule (20 mg total) by mouth daily, Disp: 90 capsule, Rfl: 1    pioglitazone (ACTOS) 15 mg tablet, Take 1 tablet (15 mg total) by mouth daily, Disp: 90 tablet, Rfl: 1    tadalafil (CIALIS) 20 MG tablet, Take 1 tablet (20 mg total) by mouth daily as needed for erectile dysfunction, Disp: 13 tablet, Rfl: 3     atorvastatin (LIPITOR) 10 mg tablet, Take 1 tablet (10 mg total) by mouth daily (Patient not taking: Reported on 2023), Disp: 90 tablet, Rfl: 1    Past Medical History:       Past Medical History:   Diagnosis Date    Aortic aneurysm (HCC)     Decreased testosterone level     Diabetes mellitus (HCC)     feels well today regarding blood sugar 22    GERD (gastroesophageal reflux disease)     High cholesterol     Hyperlipidemia     Hypertension     Reduced libido         Past Surgical History:   Procedure Laterality Date    COLONOSCOPY          Family History   Problem Relation Age of Onset    Hypertension Mother     Hypertension Father         Social History     Socioeconomic History    Marital status: /Civil Union     Spouse name: Not on file    Number of children: Not on file    Years of education: Not on file    Highest education level: Not on file   Occupational History    Not on file   Tobacco Use    Smoking status: Former     Types: Cigarettes    Smokeless tobacco: Never    Tobacco comments:     quit cigarettes-    Vaping Use    Vaping status: Every Day    Substances: Flavoring   Substance and Sexual Activity    Alcohol use: Yes     Comment: occasional beer or wine    Drug use: No    Sexual activity: Not on file   Other Topics Concern    Not on file   Social History Narrative    · Most recent tobacco use screenin2019     · Advance directive:   Yes  10/4/2019 JS    Per [place]     Social Determinants of Health     Financial Resource Strain: Low Risk  (2021)    Overall Financial Resource Strain (CARDIA)     Difficulty of Paying Living Expenses: Not hard at all   Food Insecurity: No Food Insecurity (2021)    Hunger Vital Sign     Worried About Running Out of Food in the Last Year: Never true     Ran Out of Food in the Last Year: Never true   Transportation Needs: No Transportation Needs (2021)    PRAPARE - Transportation     Lack of Transportation (Medical): No      "Lack of Transportation (Non-Medical): No   Physical Activity: Inactive (5/12/2021)    Exercise Vital Sign     Days of Exercise per Week: 0 days     Minutes of Exercise per Session: 0 min   Stress: No Stress Concern Present (5/12/2021)    [employer] [place] - Occupational Stress Questionnaire     Feeling of Stress : Not at all   Social Connections: Moderately Isolated (2/11/2021)    Social Connection and Isolation Panel [NHANES]     Frequency of Communication with Friends and Family: Three times a week     Frequency of Social Gatherings with Friends and Family: Twice a week     Attends Buddhism Services: Never     Active Member of Clubs or Organizations: No     Attends Club or Organization Meetings: Never     Marital Status:    Intimate Partner Violence: Not At Risk (2/11/2021)    Humiliation, Afraid, Rape, and Kick questionnaire     Fear of Current or Ex-Partner: No     Emotionally Abused: No     Physically Abused: No     Sexually Abused: No   Housing Stability: Not on file        Physical Exam:     /78 (BP Location: Right arm, Patient Position: Sitting, Cuff Size: Standard)   Pulse 81   Temp (!) 97.3 °F (36.3 °C) (Temporal)   Resp 17   Ht 5' 10\" (1.778 m)   Wt 88 kg (194 lb)   SpO2 98%   BMI 27.84 kg/m²     Physical Exam  Vitals and nursing note reviewed.   Constitutional:       General: He is not in acute distress.     Appearance: Normal appearance.   HENT:      Head: Normocephalic and atraumatic.   Neck:      Vascular: No carotid bruit.   Cardiovascular:      Rate and Rhythm: Normal rate and regular rhythm.      Pulses: Normal pulses.      Heart sounds: Normal heart sounds.   Pulmonary:      Effort: Pulmonary effort is normal.      Breath sounds: Normal breath sounds. No wheezing or rales.   Abdominal:      General: Bowel sounds are normal.      Palpations: Abdomen is soft.      Tenderness: There is no abdominal tenderness.   Musculoskeletal:      Right lower leg: No " edema.      Left lower leg: No edema.   Lymphadenopathy:      Cervical: No cervical adenopathy.   Skin:     General: Skin is warm and dry.      Coloration: Skin is not pale.   Neurological:      General: No focal deficit present.      Mental Status: He is alert. Mental status is at baseline.      Motor: No weakness.      Gait: Gait normal.   Psychiatric:         Mood and Affect: Mood normal.          Data:     Pre-operative work-up    Laboratory Results: I have personally reviewed the pertinent laboratory results/reports      EKG: I have personally reviewed pertinent reports.      No results found for this or any previous visit (from the past 672 hour(s)).        Assessment & Recommendations:     1. Pre-operative clearance    2. Uncontrolled type 2 diabetes mellitus with hyperglycemia (HCC)  -     POCT hemoglobin A1c    3. Diabetes mellitus type 2 in obese   -     Empagliflozin (Jardiance) 25 MG TABS; Take 1 tablet (25 mg total) by mouth every morning  -     pioglitazone (ACTOS) 15 mg tablet; Take 1 tablet (15 mg total) by mouth daily  -     glimepiride (AMARYL) 4 mg tablet; Take 1 tablet (4 mg total) by mouth daily with breakfast    4. Lumbar radiculopathy    5. Spinal stenosis of lumbar region with neurogenic claudication          Pre-Op Evaluation Assessment  58 y.o. male with planned surgery: Lumbar decompression.    Known risk factors for perioperative complications: Diabetes mellitus.        Current medications which may produce withdrawal symptoms if withheld perioperatively: none.    Pre-Op Evaluation Plan  1. Further preoperative workup as follows:   - None; no further preoperative work-up is required    2. Medication Management/Recommendations:   - None, continue medication regimen including morning of surgery, with sip of water    3. Prophylaxis for cardiac events with perioperative beta-blockers: not indicated.    4. Patient requires further consultation with: None    Clearance  Patient is CLEARED for  surgery at standard risk without any additional cardiac testing. Patient has significantly improved his diabetes in a short time. A1c was 13 on 12/14/2023. 1/12/2024 A1c was 8.9. He continues to work on it.      GASTON Roberts  [facility] PRIMARY CARE [place] [address]  [place] [address]  Phone#  [phone]  Fax#  [phone]

## 2024-02-02 ENCOUNTER — OFFICE VISIT (OUTPATIENT)
Dept: FAMILY MEDICINE CLINIC | Facility: CLINIC | Age: 59
End: 2024-02-02
Payer: COMMERCIAL

## 2024-02-02 VITALS
BODY MASS INDEX: 27.92 KG/M2 | SYSTOLIC BLOOD PRESSURE: 126 MMHG | HEIGHT: 70 IN | RESPIRATION RATE: 16 BRPM | TEMPERATURE: 97 F | OXYGEN SATURATION: 97 % | HEART RATE: 84 BPM | DIASTOLIC BLOOD PRESSURE: 70 MMHG | WEIGHT: 195 LBS

## 2024-02-02 DIAGNOSIS — E11.65 UNCONTROLLED TYPE 2 DIABETES MELLITUS WITH HYPERGLYCEMIA (HCC): Primary | ICD-10-CM

## 2024-02-02 LAB — SL AMB POCT HEMOGLOBIN AIC: 8 (ref ?–6.5)

## 2024-02-02 PROCEDURE — 83036 HEMOGLOBIN GLYCOSYLATED A1C: CPT | Performed by: NURSE PRACTITIONER

## 2024-02-02 PROCEDURE — 99213 OFFICE O/P EST LOW 20 MIN: CPT | Performed by: NURSE PRACTITIONER

## 2024-02-02 NOTE — LETTER
February 2, 2024     Patient: Paresh Mosquera  YOB: 1965  Date of Visit: 2/2/2024      To Whom it May Concern:    Paresh Mosquera is under my professional care. Paresh was seen in my office on 2/2/2024. Paresh has achieved A1c of 8.0 confirmed in office today. He was cleared by me for lumbar decompression on 1/12/2024.    If you have any questions or concerns, please don't hesitate to call.         Sincerely,          GASTON Roberts        CC: No Recipients

## 2024-02-02 NOTE — PROGRESS NOTES
Assessment/Plan:    1. Uncontrolled type 2 diabetes mellitus with hyperglycemia (HCC)  -     POCT hemoglobin A1c    Patient clinically stable at this time.  Cont with current plan of care.   Cleared for surgery A1c 8.0  RTO as recommended and PRN        There are no Patient Instructions on file for this visit.    Return in about 3 months (around 5/2/2024).    Subjective:      Patient ID: Paresh Mosquera is a 58 y.o. male.    Chief Complaint   Patient presents with    Follow-up     A1C       58-year-old male patient of mine presents for an A1c  This is a brief recheck  He is pending lumbar decompression surgery  Surgeon is holding surgery until his A1c is 8 or less  Initially A1c was 13.1.  He has been working aggressively to bring his A1c down  A1c 8.0 today        The following portions of the patient's history were reviewed and updated as appropriate: allergies, current medications, past family history, past medical history, past social history, past surgical history and problem list.    Review of Systems   Constitutional: Negative.    Respiratory: Negative.     Cardiovascular: Negative.    Endocrine: Negative.    Neurological: Negative.          Current Outpatient Medications   Medication Sig Dispense Refill    Empagliflozin (Jardiance) 25 MG TABS Take 1 tablet (25 mg total) by mouth every morning 90 tablet 3    glimepiride (AMARYL) 4 mg tablet Take 1 tablet (4 mg total) by mouth daily with breakfast 90 tablet 1    Glucose Blood (BLOOD GLUCOSE TEST STRIPS) STRP Check sugar bid 100 strip 4    lisinopril (ZESTRIL) 10 mg tablet Take 1 tablet (10 mg total) by mouth daily 90 tablet 1    omeprazole (PriLOSEC) 20 mg delayed release capsule Take 1 capsule (20 mg total) by mouth daily 90 capsule 1    pioglitazone (ACTOS) 15 mg tablet Take 1 tablet (15 mg total) by mouth daily 90 tablet 1    tadalafil (CIALIS) 20 MG tablet Take 1 tablet (20 mg total) by mouth daily as needed for erectile dysfunction 13 tablet 3     "atorvastatin (LIPITOR) 10 mg tablet Take 1 tablet (10 mg total) by mouth daily (Patient not taking: Reported on 12/14/2023) 90 tablet 1     No current facility-administered medications for this visit.       Objective:    /70 (BP Location: Left arm, Patient Position: Sitting, Cuff Size: Standard)   Pulse 84   Temp (!) 97 °F (36.1 °C) (Temporal)   Resp 16   Ht 5' 10\" (1.778 m)   Wt 88.5 kg (195 lb)   SpO2 97%   BMI 27.98 kg/m²        Physical Exam  Vitals and nursing note reviewed.   Constitutional:       General: He is not in acute distress.     Appearance: Normal appearance. He is not ill-appearing.   Cardiovascular:      Rate and Rhythm: Normal rate and regular rhythm.      Pulses: Normal pulses.      Heart sounds: Normal heart sounds.   Pulmonary:      Effort: Pulmonary effort is normal.      Breath sounds: Normal breath sounds.   Neurological:      General: No focal deficit present.      Mental Status: He is alert. Mental status is at baseline.   Psychiatric:         Mood and Affect: Mood normal.                GASTON Roberts  "

## 2024-02-26 ENCOUNTER — APPOINTMENT (OUTPATIENT)
Dept: LAB | Facility: CLINIC | Age: 59
End: 2024-02-26
Payer: COMMERCIAL

## 2024-02-26 DIAGNOSIS — Z01.812 PRE-OPERATIVE LABORATORY EXAMINATION: ICD-10-CM

## 2024-02-26 LAB
ALBUMIN SERPL BCP-MCNC: 4 G/DL (ref 3.5–5)
ALP SERPL-CCNC: 57 U/L (ref 34–104)
ALT SERPL W P-5'-P-CCNC: 11 U/L (ref 7–52)
ANION GAP SERPL CALCULATED.3IONS-SCNC: 8 MMOL/L
APTT PPP: 31 SECONDS (ref 23–37)
AST SERPL W P-5'-P-CCNC: 12 U/L (ref 13–39)
BACTERIA UR QL AUTO: NORMAL /HPF
BASOPHILS # BLD AUTO: 0.05 THOUSANDS/ÂΜL (ref 0–0.1)
BASOPHILS NFR BLD AUTO: 1 % (ref 0–1)
BILIRUB SERPL-MCNC: 0.63 MG/DL (ref 0.2–1)
BILIRUB UR QL STRIP: NEGATIVE
BUN SERPL-MCNC: 26 MG/DL (ref 5–25)
CALCIUM SERPL-MCNC: 9.3 MG/DL (ref 8.4–10.2)
CHLORIDE SERPL-SCNC: 103 MMOL/L (ref 96–108)
CLARITY UR: CLEAR
CO2 SERPL-SCNC: 26 MMOL/L (ref 21–32)
COLOR UR: ABNORMAL
CREAT SERPL-MCNC: 0.79 MG/DL (ref 0.6–1.3)
EOSINOPHIL # BLD AUTO: 0.32 THOUSAND/ÂΜL (ref 0–0.61)
EOSINOPHIL NFR BLD AUTO: 5 % (ref 0–6)
ERYTHROCYTE [DISTWIDTH] IN BLOOD BY AUTOMATED COUNT: 15.3 % (ref 11.6–15.1)
GFR SERPL CREATININE-BSD FRML MDRD: 99 ML/MIN/1.73SQ M
GLUCOSE P FAST SERPL-MCNC: 96 MG/DL (ref 65–99)
GLUCOSE UR STRIP-MCNC: ABNORMAL MG/DL
HCT VFR BLD AUTO: 44.6 % (ref 36.5–49.3)
HGB BLD-MCNC: 13.8 G/DL (ref 12–17)
HGB UR QL STRIP.AUTO: NEGATIVE
IMM GRANULOCYTES # BLD AUTO: 0.01 THOUSAND/UL (ref 0–0.2)
IMM GRANULOCYTES NFR BLD AUTO: 0 % (ref 0–2)
INR PPP: 0.99 (ref 0.84–1.19)
KETONES UR STRIP-MCNC: ABNORMAL MG/DL
LEUKOCYTE ESTERASE UR QL STRIP: ABNORMAL
LYMPHOCYTES # BLD AUTO: 2.21 THOUSANDS/ÂΜL (ref 0.6–4.47)
LYMPHOCYTES NFR BLD AUTO: 34 % (ref 14–44)
MCH RBC QN AUTO: 23.8 PG (ref 26.8–34.3)
MCHC RBC AUTO-ENTMCNC: 30.9 G/DL (ref 31.4–37.4)
MCV RBC AUTO: 77 FL (ref 82–98)
MONOCYTES # BLD AUTO: 0.53 THOUSAND/ÂΜL (ref 0.17–1.22)
MONOCYTES NFR BLD AUTO: 8 % (ref 4–12)
NEUTROPHILS # BLD AUTO: 3.35 THOUSANDS/ÂΜL (ref 1.85–7.62)
NEUTS SEG NFR BLD AUTO: 52 % (ref 43–75)
NITRITE UR QL STRIP: NEGATIVE
NON-SQ EPI CELLS URNS QL MICRO: NORMAL /HPF
NRBC BLD AUTO-RTO: 0 /100 WBCS
PH UR STRIP.AUTO: 5.5 [PH]
PLATELET # BLD AUTO: 330 THOUSANDS/UL (ref 149–390)
PMV BLD AUTO: 10.4 FL (ref 8.9–12.7)
POTASSIUM SERPL-SCNC: 4.3 MMOL/L (ref 3.5–5.3)
PROT SERPL-MCNC: 7.4 G/DL (ref 6.4–8.4)
PROT UR STRIP-MCNC: NEGATIVE MG/DL
PROTHROMBIN TIME: 13.7 SECONDS (ref 11.6–14.5)
RBC # BLD AUTO: 5.8 MILLION/UL (ref 3.88–5.62)
RBC #/AREA URNS AUTO: NORMAL /HPF
SODIUM SERPL-SCNC: 137 MMOL/L (ref 135–147)
SP GR UR STRIP.AUTO: 1.03 (ref 1–1.03)
UROBILINOGEN UR STRIP-ACNC: <2 MG/DL
WBC # BLD AUTO: 6.47 THOUSAND/UL (ref 4.31–10.16)
WBC #/AREA URNS AUTO: NORMAL /HPF

## 2024-02-26 PROCEDURE — 85610 PROTHROMBIN TIME: CPT

## 2024-02-26 PROCEDURE — 85025 COMPLETE CBC W/AUTO DIFF WBC: CPT

## 2024-02-26 PROCEDURE — 80053 COMPREHEN METABOLIC PANEL: CPT

## 2024-02-26 PROCEDURE — 81001 URINALYSIS AUTO W/SCOPE: CPT

## 2024-02-26 PROCEDURE — 85730 THROMBOPLASTIN TIME PARTIAL: CPT

## 2024-02-26 PROCEDURE — 36415 COLL VENOUS BLD VENIPUNCTURE: CPT

## 2024-03-13 ENCOUNTER — TELEPHONE (OUTPATIENT)
Age: 59
End: 2024-03-13

## 2024-03-22 ENCOUNTER — TELEPHONE (OUTPATIENT)
Dept: FAMILY MEDICINE CLINIC | Facility: CLINIC | Age: 59
End: 2024-03-22

## 2024-03-22 NOTE — TELEPHONE ENCOUNTER
LM for pt-- appt with Debbie Medel for 4/12 needs to be rescheduled as provider is not in that day.

## 2024-04-16 RX ORDER — OXYCODONE HYDROCHLORIDE AND ACETAMINOPHEN 5; 325 MG/1; MG/1
1 TABLET ORAL EVERY 6 HOURS PRN
COMMUNITY
Start: 2024-03-18

## 2024-04-17 ENCOUNTER — OFFICE VISIT (OUTPATIENT)
Dept: FAMILY MEDICINE CLINIC | Facility: CLINIC | Age: 59
End: 2024-04-17
Payer: COMMERCIAL

## 2024-04-17 VITALS
HEART RATE: 80 BPM | BODY MASS INDEX: 27.92 KG/M2 | SYSTOLIC BLOOD PRESSURE: 132 MMHG | HEIGHT: 70 IN | RESPIRATION RATE: 17 BRPM | DIASTOLIC BLOOD PRESSURE: 66 MMHG | TEMPERATURE: 98.2 F | WEIGHT: 195 LBS | OXYGEN SATURATION: 97 %

## 2024-04-17 DIAGNOSIS — R20.0 NUMBNESS OF TOES: ICD-10-CM

## 2024-04-17 DIAGNOSIS — I10 ESSENTIAL HYPERTENSION: ICD-10-CM

## 2024-04-17 DIAGNOSIS — E66.3 OVERWEIGHT WITH BODY MASS INDEX (BMI) OF 29 TO 29.9 IN ADULT: ICD-10-CM

## 2024-04-17 DIAGNOSIS — E11.69 TYPE 2 DIABETES MELLITUS WITH OBESITY  (HCC): ICD-10-CM

## 2024-04-17 DIAGNOSIS — M48.062 SPINAL STENOSIS OF LUMBAR REGION WITH NEUROGENIC CLAUDICATION: ICD-10-CM

## 2024-04-17 DIAGNOSIS — E66.9 TYPE 2 DIABETES MELLITUS WITH OBESITY  (HCC): ICD-10-CM

## 2024-04-17 DIAGNOSIS — H53.9 VISION DISTURBANCE: Primary | ICD-10-CM

## 2024-04-17 PROBLEM — Z00.00 ANNUAL PHYSICAL EXAM: Status: RESOLVED | Noted: 2021-02-11 | Resolved: 2024-04-17

## 2024-04-17 PROBLEM — R42 DIZZINESS: Status: RESOLVED | Noted: 2021-01-14 | Resolved: 2024-04-17

## 2024-04-17 PROBLEM — U07.1 TELEHEALTH ENCOUNTER FOR CONFIRMED COVID-19: Status: RESOLVED | Noted: 2021-12-30 | Resolved: 2024-04-17

## 2024-04-17 PROBLEM — M79.609 POPLITEAL PAIN: Status: RESOLVED | Noted: 2023-05-12 | Resolved: 2024-04-17

## 2024-04-17 PROBLEM — R52 BODY ACHES: Status: RESOLVED | Noted: 2021-12-22 | Resolved: 2024-04-17

## 2024-04-17 PROBLEM — K06.8 PAIN IN GUMS: Status: RESOLVED | Noted: 2021-06-14 | Resolved: 2024-04-17

## 2024-04-17 PROBLEM — M79.672 FOOT PAIN, LEFT: Status: RESOLVED | Noted: 2022-09-01 | Resolved: 2024-04-17

## 2024-04-17 PROCEDURE — 99214 OFFICE O/P EST MOD 30 MIN: CPT | Performed by: NURSE PRACTITIONER

## 2024-04-17 RX ORDER — GLIMEPIRIDE 4 MG/1
4 TABLET ORAL
Qty: 90 TABLET | Refills: 1 | Status: SHIPPED | OUTPATIENT
Start: 2024-04-17 | End: 2024-10-14

## 2024-04-17 RX ORDER — PIOGLITAZONEHYDROCHLORIDE 15 MG/1
15 TABLET ORAL DAILY
Qty: 90 TABLET | Refills: 1 | Status: SHIPPED | OUTPATIENT
Start: 2024-04-17 | End: 2024-10-14

## 2024-04-17 RX ORDER — PREGABALIN 75 MG/1
75 CAPSULE ORAL 2 TIMES DAILY
COMMUNITY
Start: 2024-04-01

## 2024-04-17 RX ORDER — OXYCODONE HYDROCHLORIDE AND ACETAMINOPHEN 5; 325 MG/1; MG/1
1 TABLET ORAL EVERY 6 HOURS PRN
Status: CANCELLED | OUTPATIENT
Start: 2024-04-17

## 2024-04-17 NOTE — PROGRESS NOTES
Assessment/Plan:    1. Vision disturbance  -     Ambulatory Referral to Ophthalmology; Future    2. Type 2 diabetes mellitus with obesity  (HCC)  -     pioglitazone (ACTOS) 15 mg tablet; Take 1 tablet (15 mg total) by mouth daily  -     glimepiride (AMARYL) 4 mg tablet; Take 1 tablet (4 mg total) by mouth daily with breakfast  -     Empagliflozin (Jardiance) 25 MG TABS; Take 1 tablet (25 mg total) by mouth every morning  -     Hemoglobin A1C; Future; Expected date: 07/17/2024  -     Basic metabolic panel; Future; Expected date: 07/17/2024    3. Essential hypertension  Comments:  controlled    4. Numbness of toes  Comments:  s/p lumbar decompression surgery 3/2024    5. Overweight with body mass index (BMI) of 29 to 29.9 in adult    6. Spinal stenosis of lumbar region with neurogenic claudication  Comments:  s/p Lumbar decompression surgery 3/2024        The case discussed with patient using patient centered shared decision making.The patient was counseled regarding instructions for management,-- risk factor reductions,-- prognosis,-- impressions,-- risks and benefits of treatment options,-- importance of compliance with treatment. I have reviewed the instructions with the patient, answering all questions to his satisfaction.    Clinically stable  Cont current rx  Encouraged continued compliance with DM and HTN management  Cont restorative care s/p lumbar surgery  Return to office in 3 months for recheck    Depression Screening and Follow-up Plan: Patient was screened for depression during today's encounter. They screened negative with a PHQ-2 score of 0.             Return in 3 months (on 7/17/2024) for Diabetes Follow-up.    I have spent a total time of 30 minutes on 04/17/24 in caring for this patient including Diagnostic results, Prognosis, Risks and benefits of tx options, Instructions for management, Patient and family education, Importance of tx compliance, Risk factor reductions, Impressions, Counseling /  Coordination of care, Documenting in the medical record, Reviewing / ordering tests, medicine, procedures  , and Obtaining or reviewing history  .    Subjective:      Patient ID: Paresh Mosquera is a 58 y.o. male.    Chief Complaint   Patient presents with    Follow-up     3 month       58-year-old male with history of diabetes, hypertension, dyslipidemia, GERD presents for follow up-not due for A1c yet  He reports that he has been in his usual state of health    He is s/p lumbar decompression 3/2024, has started PT    He endorses that he continues to be compliant          Reviewed medical history in detail. Changes to medical record changed where appropriate. Reviewed medications. Patient taking as prescribed. Tolerating well. Denies Side effects. Reviewed recent visits with specialists co-managing chronic conditions.     The following portions of the patient's history were reviewed and updated as appropriate: allergies, current medications, past family history, past medical history, past social history, past surgical history and problem list.    Review of Systems   Constitutional:  Negative for fatigue, fever and unexpected weight change.   HENT:  Negative for trouble swallowing.    Eyes:  Positive for visual disturbance (blurred vision when reading-overdue for eye exam).   Respiratory:  Negative for cough and shortness of breath.    Cardiovascular:  Negative for chest pain, palpitations and leg swelling.   Gastrointestinal:  Negative for abdominal pain and blood in stool.   Endocrine: Negative.    Genitourinary:  Negative for decreased urine volume, difficulty urinating and hematuria.   Musculoskeletal:  Positive for arthralgias and back pain. Negative for gait problem.   Skin:  Negative for pallor and rash.   Neurological:  Negative for dizziness, syncope and weakness.   Hematological:  Does not bruise/bleed easily.   Psychiatric/Behavioral:  Negative for confusion and dysphoric mood. The patient is not  "nervous/anxious.          Current Outpatient Medications   Medication Sig Dispense Refill    Empagliflozin (Jardiance) 25 MG TABS Take 1 tablet (25 mg total) by mouth every morning 90 tablet 3    glimepiride (AMARYL) 4 mg tablet Take 1 tablet (4 mg total) by mouth daily with breakfast 90 tablet 1    Glucose Blood (BLOOD GLUCOSE TEST STRIPS) STRP Check sugar bid 100 strip 4    lisinopril (ZESTRIL) 10 mg tablet Take 1 tablet (10 mg total) by mouth daily 90 tablet 1    omeprazole (PriLOSEC) 20 mg delayed release capsule Take 1 capsule (20 mg total) by mouth daily 90 capsule 1    oxyCODONE-acetaminophen (PERCOCET) 5-325 mg per tablet Take 1 tablet by mouth every 6 (six) hours as needed every 6 to 8 hours as needed for pain      pioglitazone (ACTOS) 15 mg tablet Take 1 tablet (15 mg total) by mouth daily 90 tablet 1    pregabalin (LYRICA) 75 mg capsule Take 75 mg by mouth 2 (two) times a day      tadalafil (CIALIS) 20 MG tablet Take 1 tablet (20 mg total) by mouth daily as needed for erectile dysfunction 13 tablet 3    atorvastatin (LIPITOR) 10 mg tablet Take 1 tablet (10 mg total) by mouth daily (Patient not taking: Reported on 12/14/2023) 90 tablet 1     No current facility-administered medications for this visit.       Patient Active Problem List   Diagnosis    Type 2 diabetes mellitus with obesity  (HCC)    Essential hypertension    Erectile dysfunction due to diabetes mellitus  (HCC)    Overweight with body mass index (BMI) of 29 to 29.9 in adult    Type 2 diabetes mellitus with hyperglycemia (HCC)    Thoracic aortic aneurysm without rupture (HCC)    Gastroesophageal reflux disease without esophagitis    Dyslipidemia    Numbness of toes       Objective:    /66 (BP Location: Left arm, Patient Position: Sitting, Cuff Size: Large)   Pulse 80   Temp 98.2 °F (36.8 °C) (Temporal)   Resp 17   Ht 5' 10\" (1.778 m)   Wt 88.5 kg (195 lb)   SpO2 97%   BMI 27.98 kg/m²        Physical Exam  Vitals and nursing note " reviewed.   Constitutional:       General: He is not in acute distress.     Appearance: Normal appearance.   HENT:      Head: Normocephalic and atraumatic.   Neck:      Vascular: No carotid bruit.   Cardiovascular:      Rate and Rhythm: Normal rate and regular rhythm.      Pulses: Normal pulses. no weak pulses.           Dorsalis pedis pulses are 2+ on the right side and 2+ on the left side.        Posterior tibial pulses are 2+ on the right side and 2+ on the left side.      Heart sounds: Normal heart sounds.   Pulmonary:      Effort: Pulmonary effort is normal.      Breath sounds: Normal breath sounds. No wheezing or rales.   Abdominal:      General: Bowel sounds are normal.      Palpations: Abdomen is soft.      Tenderness: There is no abdominal tenderness.   Musculoskeletal:      Right lower leg: No edema.      Left lower leg: No edema.   Feet:      Right foot:      Skin integrity: No ulcer, skin breakdown, erythema, warmth, callus or dry skin.      Left foot:      Skin integrity: No ulcer, skin breakdown, erythema, warmth, callus or dry skin.   Lymphadenopathy:      Cervical: No cervical adenopathy.   Skin:     General: Skin is warm and dry.      Coloration: Skin is not pale.   Neurological:      General: No focal deficit present.      Mental Status: He is alert. Mental status is at baseline.      Motor: No weakness.      Gait: Gait normal.   Psychiatric:         Mood and Affect: Mood normal.         Behavior: Behavior normal.         Diabetic Foot Exam    Patient's shoes and socks removed.    Right Foot/Ankle   Right Foot Inspection  Skin Exam: skin normal and skin intact. No dry skin, no warmth, no callus, no erythema, no maceration, no abnormal color, no pre-ulcer, no ulcer and no callus.     Toe Exam: ROM and strength within normal limits.     Sensory   Vibration: intact  Proprioception: intact  Monofilament testing: intact    Vascular  Capillary refills: < 3 seconds  The right DP pulse is 2+. The right PT  pulse is 2+.     Left Foot/Ankle  Left Foot Inspection  Skin Exam: skin normal and skin intact. No dry skin, no warmth, no erythema, no maceration, normal color, no pre-ulcer, no ulcer and no callus.     Toe Exam: ROM and strength within normal limits.     Sensory   Vibration: intact  Proprioception: intact  Monofilament testing: intact    Vascular  Capillary refills: < 3 seconds  The left DP pulse is 2+. The left PT pulse is 2+.     Assign Risk Category  No deformity present  No loss of protective sensation  No weak pulses  Risk: 0         GASTON Roberts

## 2024-04-25 LAB
LEFT EYE DIABETIC RETINOPATHY: NORMAL
RIGHT EYE DIABETIC RETINOPATHY: NORMAL

## 2024-04-25 PROCEDURE — 2023F DILAT RTA XM W/O RTNOPTHY: CPT | Performed by: NURSE PRACTITIONER

## 2024-05-17 ENCOUNTER — APPOINTMENT (OUTPATIENT)
Dept: LAB | Facility: CLINIC | Age: 59
End: 2024-05-17
Payer: OTHER MISCELLANEOUS

## 2024-05-17 DIAGNOSIS — K40.90 INGUINAL HERNIA WITHOUT OBSTRUCTION OR GANGRENE, RECURRENCE NOT SPECIFIED, UNSPECIFIED LATERALITY: ICD-10-CM

## 2024-05-17 LAB
ALBUMIN SERPL BCP-MCNC: 4.2 G/DL (ref 3.5–5)
ALP SERPL-CCNC: 66 U/L (ref 34–104)
ALT SERPL W P-5'-P-CCNC: 11 U/L (ref 7–52)
ANION GAP SERPL CALCULATED.3IONS-SCNC: 7 MMOL/L (ref 4–13)
APTT PPP: 30 SECONDS (ref 23–37)
AST SERPL W P-5'-P-CCNC: 11 U/L (ref 13–39)
BASOPHILS # BLD AUTO: 0.07 THOUSANDS/ÂΜL (ref 0–0.1)
BASOPHILS NFR BLD AUTO: 1 % (ref 0–1)
BILIRUB SERPL-MCNC: 0.61 MG/DL (ref 0.2–1)
BUN SERPL-MCNC: 22 MG/DL (ref 5–25)
CALCIUM SERPL-MCNC: 9.3 MG/DL (ref 8.4–10.2)
CHLORIDE SERPL-SCNC: 103 MMOL/L (ref 96–108)
CO2 SERPL-SCNC: 27 MMOL/L (ref 21–32)
CREAT SERPL-MCNC: 0.78 MG/DL (ref 0.6–1.3)
EOSINOPHIL # BLD AUTO: 0.35 THOUSAND/ÂΜL (ref 0–0.61)
EOSINOPHIL NFR BLD AUTO: 5 % (ref 0–6)
ERYTHROCYTE [DISTWIDTH] IN BLOOD BY AUTOMATED COUNT: 18.1 % (ref 11.6–15.1)
EST. AVERAGE GLUCOSE BLD GHB EST-MCNC: 166 MG/DL
GFR SERPL CREATININE-BSD FRML MDRD: 100 ML/MIN/1.73SQ M
GLUCOSE P FAST SERPL-MCNC: 106 MG/DL (ref 65–99)
HBA1C MFR BLD: 7.4 %
HCT VFR BLD AUTO: 46 % (ref 36.5–49.3)
HGB BLD-MCNC: 14.2 G/DL (ref 12–17)
IMM GRANULOCYTES # BLD AUTO: 0.02 THOUSAND/UL (ref 0–0.2)
IMM GRANULOCYTES NFR BLD AUTO: 0 % (ref 0–2)
INR PPP: 0.98 (ref 0.84–1.19)
LYMPHOCYTES # BLD AUTO: 2.44 THOUSANDS/ÂΜL (ref 0.6–4.47)
LYMPHOCYTES NFR BLD AUTO: 32 % (ref 14–44)
MCH RBC QN AUTO: 23.2 PG (ref 26.8–34.3)
MCHC RBC AUTO-ENTMCNC: 30.9 G/DL (ref 31.4–37.4)
MCV RBC AUTO: 75 FL (ref 82–98)
MONOCYTES # BLD AUTO: 0.58 THOUSAND/ÂΜL (ref 0.17–1.22)
MONOCYTES NFR BLD AUTO: 8 % (ref 4–12)
NEUTROPHILS # BLD AUTO: 4.1 THOUSANDS/ÂΜL (ref 1.85–7.62)
NEUTS SEG NFR BLD AUTO: 54 % (ref 43–75)
NRBC BLD AUTO-RTO: 0 /100 WBCS
PLATELET # BLD AUTO: 295 THOUSANDS/UL (ref 149–390)
PMV BLD AUTO: 10.6 FL (ref 8.9–12.7)
POTASSIUM SERPL-SCNC: 4.1 MMOL/L (ref 3.5–5.3)
PROT SERPL-MCNC: 7.8 G/DL (ref 6.4–8.4)
PROTHROMBIN TIME: 13.6 SECONDS (ref 11.6–14.5)
RBC # BLD AUTO: 6.11 MILLION/UL (ref 3.88–5.62)
SODIUM SERPL-SCNC: 137 MMOL/L (ref 135–147)
WBC # BLD AUTO: 7.56 THOUSAND/UL (ref 4.31–10.16)

## 2024-05-17 PROCEDURE — 36415 COLL VENOUS BLD VENIPUNCTURE: CPT

## 2024-05-17 PROCEDURE — 80053 COMPREHEN METABOLIC PANEL: CPT

## 2024-05-17 PROCEDURE — 85610 PROTHROMBIN TIME: CPT

## 2024-05-17 PROCEDURE — 85730 THROMBOPLASTIN TIME PARTIAL: CPT

## 2024-05-17 PROCEDURE — 85025 COMPLETE CBC W/AUTO DIFF WBC: CPT

## 2024-05-17 PROCEDURE — 83036 HEMOGLOBIN GLYCOSYLATED A1C: CPT

## 2024-05-24 ENCOUNTER — CONSULT (OUTPATIENT)
Dept: FAMILY MEDICINE CLINIC | Facility: CLINIC | Age: 59
End: 2024-05-24
Payer: COMMERCIAL

## 2024-05-24 VITALS
DIASTOLIC BLOOD PRESSURE: 68 MMHG | RESPIRATION RATE: 17 BRPM | WEIGHT: 203 LBS | TEMPERATURE: 97 F | SYSTOLIC BLOOD PRESSURE: 130 MMHG | OXYGEN SATURATION: 97 % | HEIGHT: 70 IN | HEART RATE: 81 BPM | BODY MASS INDEX: 29.06 KG/M2

## 2024-05-24 DIAGNOSIS — E11.69 TYPE 2 DIABETES MELLITUS WITH OBESITY  (HCC): ICD-10-CM

## 2024-05-24 DIAGNOSIS — E66.9 TYPE 2 DIABETES MELLITUS WITH OBESITY  (HCC): ICD-10-CM

## 2024-05-24 DIAGNOSIS — Z01.818 PRE-OP EXAMINATION: Primary | ICD-10-CM

## 2024-05-24 DIAGNOSIS — K40.90 RIGHT INGUINAL HERNIA: ICD-10-CM

## 2024-05-24 DIAGNOSIS — Z13.6 SCREENING FOR CARDIOVASCULAR CONDITION: ICD-10-CM

## 2024-05-24 PROCEDURE — 93000 ELECTROCARDIOGRAM COMPLETE: CPT | Performed by: NURSE PRACTITIONER

## 2024-05-24 PROCEDURE — 99214 OFFICE O/P EST MOD 30 MIN: CPT | Performed by: NURSE PRACTITIONER

## 2024-05-24 NOTE — PROGRESS NOTES
[facility] PRE-OPERATIVE EVALUATION  [facility] PHYSICIAN GROUP - [facility]ly [facility] CARE [place]    NAME: Paresh Mosquera  AGE: 58 y.o. SEX: male  : 1965     DATE: 2024    [facility] Pre-Operative Evaluation      Chief Complaint: Pre-operative Evaluation     Surgery: Right inguinal hernia repair  Anticipated Date of Surgery: 24  Surgeon: Dr. Duncan Ford Fax: [phone]     History of Present Illness:     HPI    Anesthesia:  general  Bleeding Risk: no recent abnormal bleeding, no remote history of abnormal bleeding, and no use of Ca-channel blockers  Current Anti-platelet/anticoagulation medication:  none    Assessment of Cardiac Risk:  Denies unstable or severe angina or MI in the last 6 weeks or history of stent placement in the last year   Denies decompensated heart failure (e.g. New onset heart failure, NYHA functional class IV heart failure, or worsening existing heart failure)  Denies significant arrhythmias such as high grade AV block, symptomatic ventricular arrhythmia, newly recognized ventricular tachycardia, supraventricular tachycardia with resting heart rate >100, or symptomatic bradycardia  Denies severe heart valve disease including aortic stenosis or symptomatic mitral stenosis     Exercise Capacity:  Able to walk 4 blocks without symptoms?: Yes  Able to walk 2 flights without symptoms?: Yes  Works in physically demanding job. Performs work = 6 METs without cardiac symptoms    Prior Anesthesia Reactions: No. Did very well with anesthesia during back surgery Winter 2024     Personal history of venous thromboembolic disease? No    History of steroid use for >2 weeks within last year? No         Review of Systems:     Review of Systems   Constitutional:  Negative for fatigue, fever and unexpected weight change.   HENT:  Negative for trouble swallowing.    Respiratory:  Negative for cough and shortness of breath.    Cardiovascular:  Negative for chest pain, palpitations and  leg swelling.   Gastrointestinal:  Negative for abdominal pain and blood in stool.   Endocrine: Negative.    Genitourinary:  Negative for decreased urine volume, difficulty urinating and hematuria.   Musculoskeletal:  Positive for arthralgias and back pain. Negative for gait problem.   Skin:  Negative for pallor.   Neurological:  Negative for dizziness, syncope and weakness.   Hematological:  Does not bruise/bleed easily.   Psychiatric/Behavioral:  Negative for confusion.        Current Problem List:     Patient Active Problem List   Diagnosis    Type 2 diabetes mellitus with obesity  (HCC)    Essential hypertension    Erectile dysfunction due to diabetes mellitus  (HCC)    Overweight with body mass index (BMI) of 29 to 29.9 in adult    Type 2 diabetes mellitus with hyperglycemia (HCC)    Thoracic aortic aneurysm without rupture (HCC)    Gastroesophageal reflux disease without esophagitis    Dyslipidemia    Numbness of toes       Allergies:     Allergies   Allergen Reactions    Penicillins Swelling    Metformin Diarrhea    Clindamycin Hives       Current Medications:       Current Outpatient Medications:     Empagliflozin (Jardiance) 25 MG TABS, Take 1 tablet (25 mg total) by mouth every morning, Disp: 90 tablet, Rfl: 3    glimepiride (AMARYL) 4 mg tablet, Take 1 tablet (4 mg total) by mouth daily with breakfast, Disp: 90 tablet, Rfl: 1    Glucose Blood (BLOOD GLUCOSE TEST STRIPS) STRP, Check sugar bid, Disp: 100 strip, Rfl: 4    lisinopril (ZESTRIL) 10 mg tablet, Take 1 tablet (10 mg total) by mouth daily, Disp: 90 tablet, Rfl: 1    omeprazole (PriLOSEC) 20 mg delayed release capsule, Take 1 capsule (20 mg total) by mouth daily, Disp: 90 capsule, Rfl: 1    oxyCODONE-acetaminophen (PERCOCET) 5-325 mg per tablet, Take 1 tablet by mouth every 6 (six) hours as needed every 6 to 8 hours as needed for pain, Disp: , Rfl:     pioglitazone (ACTOS) 15 mg tablet, Take 1 tablet (15 mg total) by mouth daily, Disp: 90 tablet,  Rfl: 1    pregabalin (LYRICA) 75 mg capsule, Take 75 mg by mouth 2 (two) times a day, Disp: , Rfl:     tadalafil (CIALIS) 20 MG tablet, Take 1 tablet (20 mg total) by mouth daily as needed for erectile dysfunction, Disp: 13 tablet, Rfl: 3    atorvastatin (LIPITOR) 10 mg tablet, Take 1 tablet (10 mg total) by mouth daily (Patient not taking: Reported on 2023), Disp: 90 tablet, Rfl: 1    Past Medical History:       Past Medical History:   Diagnosis Date    Aortic aneurysm (HCC)     Decreased testosterone level     Diabetes mellitus (HCC)     feels well today regarding blood sugar 22    GERD (gastroesophageal reflux disease)     High cholesterol     Hyperlipidemia     Hypertension     Reduced libido         Past Surgical History:   Procedure Laterality Date    COLONOSCOPY          Family History   Problem Relation Age of Onset    Hypertension Mother     Hypertension Father         Social History     Socioeconomic History    Marital status: /Civil Union     Spouse name: Not on file    Number of children: Not on file    Years of education: Not on file    Highest education level: Not on file   Occupational History    Not on file   Tobacco Use    Smoking status: Former     Types: Cigarettes    Smokeless tobacco: Never    Tobacco comments:     quit cigarettes-    Vaping Use    Vaping status: Every Day    Substances: Flavoring   Substance and Sexual Activity    Alcohol use: Yes     Comment: occasional beer or wine    Drug use: No    Sexual activity: Not on file   Other Topics Concern    Not on file   Social History Narrative    · Most recent tobacco use screenin2019     · Advance directive:   Yes  10/4/2019 JS    Per Lazara     Social Determinants of Health     Financial Resource Strain: Low Risk  (2021)    Overall Financial Resource Strain (CARDIA)     Difficulty of Paying Living Expenses: Not hard at all   Food Insecurity: No Food Insecurity (2021)    Hunger Vital Sign      "Worried About Running Out of Food in the Last Year: Never true     Ran Out of Food in the Last Year: Never true   Transportation Needs: No Transportation Needs (2/11/2021)    PRAPARE - Transportation     Lack of Transportation (Medical): No     Lack of Transportation (Non-Medical): No   Physical Activity: Inactive (5/12/2021)    Exercise Vital Sign     Days of Exercise per Week: 0 days     Minutes of Exercise per Session: 0 min   Stress: No Stress Concern Present (5/12/2021)    [employer] [place] [employer] - Occupational Stress Questionnaire     Feeling of Stress : Not at all   Social Connections: Moderately Isolated (2/11/2021)    Social Connection and Isolation Panel [NHANES]     Frequency of Communication with Friends and Family: Three times a week     Frequency of Social Gatherings with Friends and Family: Twice a week     Attends Orthodox Services: Never     Active Member of Clubs or Organizations: No     Attends Club or Organization Meetings: Never     Marital Status:    Intimate Partner Violence: Not At Risk (2/11/2021)    Humiliation, Afraid, Rape, and Kick questionnaire     Fear of Current or Ex-Partner: No     Emotionally Abused: No     Physically Abused: No     Sexually Abused: No   Housing Stability: Not on file        Physical Exam:     /68 (BP Location: Right arm, Patient Position: Sitting, Cuff Size: Large)   Pulse 81   Temp (!) 97 °F (36.1 °C) (Temporal)   Resp 17   Ht 5' 10\" (1.778 m)   Wt 92.1 kg (203 lb)   SpO2 97%   BMI 29.13 kg/m²     Physical Exam  Vitals and nursing note reviewed.   Constitutional:       General: He is not in acute distress.     Appearance: Normal appearance.   HENT:      Head: Normocephalic and atraumatic.   Neck:      Vascular: No carotid bruit.   Cardiovascular:      Rate and Rhythm: Normal rate and regular rhythm.      Pulses: Normal pulses.      Heart sounds: Normal heart sounds.   Pulmonary:      Effort: Pulmonary effort is normal.      " Breath sounds: Normal breath sounds. No wheezing or rales.   Abdominal:      General: Bowel sounds are normal.      Palpations: Abdomen is soft.      Tenderness: There is no abdominal tenderness.   Musculoskeletal:      Right lower leg: No edema.      Left lower leg: No edema.   Lymphadenopathy:      Cervical: No cervical adenopathy.   Skin:     General: Skin is warm and dry.      Coloration: Skin is not pale.   Neurological:      General: No focal deficit present.      Mental Status: He is alert. Mental status is at baseline.      Motor: No weakness.      Gait: Gait normal.   Psychiatric:         Mood and Affect: Mood normal.          Data:     Pre-operative work-up    Laboratory Results: I have personally reviewed the pertinent laboratory results/reports      EKG: performed in office today. Normal sinus rhythm 73 bpm. Nonspec ST changes. No acute changes. Unchanged compared 11/2023    Recent Results (from the past 672 hour(s))   Hemoglobin A1C    Collection Time: 05/17/24 11:38 AM   Result Value Ref Range    Hemoglobin A1C 7.4 (H) Normal 4.0-5.6%; PreDiabetic 5.7-6.4%; Diabetic >=6.5%; Glycemic control for adults with diabetes <7.0% %     mg/dl   CBC and differential    Collection Time: 05/17/24 11:38 AM   Result Value Ref Range    WBC 7.56 4.31 - 10.16 Thousand/uL    RBC 6.11 (H) 3.88 - 5.62 Million/uL    Hemoglobin 14.2 12.0 - 17.0 g/dL    Hematocrit 46.0 36.5 - 49.3 %    MCV 75 (L) 82 - 98 fL    MCH 23.2 (L) 26.8 - 34.3 pg    MCHC 30.9 (L) 31.4 - 37.4 g/dL    RDW 18.1 (H) 11.6 - 15.1 %    MPV 10.6 8.9 - 12.7 fL    Platelets 295 149 - 390 Thousands/uL    nRBC 0 /100 WBCs    Segmented % 54 43 - 75 %    Immature Grans % 0 0 - 2 %    Lymphocytes % 32 14 - 44 %    Monocytes % 8 4 - 12 %    Eosinophils Relative 5 0 - 6 %    Basophils Relative 1 0 - 1 %    Absolute Neutrophils 4.10 1.85 - 7.62 Thousands/µL    Absolute Immature Grans 0.02 0.00 - 0.20 Thousand/uL    Absolute Lymphocytes 2.44 0.60 - 4.47  Thousands/µL    Absolute Monocytes 0.58 0.17 - 1.22 Thousand/µL    Eosinophils Absolute 0.35 0.00 - 0.61 Thousand/µL    Basophils Absolute 0.07 0.00 - 0.10 Thousands/µL   Comprehensive metabolic panel    Collection Time: 05/17/24 11:38 AM   Result Value Ref Range    Sodium 137 135 - 147 mmol/L    Potassium 4.1 3.5 - 5.3 mmol/L    Chloride 103 96 - 108 mmol/L    CO2 27 21 - 32 mmol/L    ANION GAP 7 4 - 13 mmol/L    BUN 22 5 - 25 mg/dL    Creatinine 0.78 0.60 - 1.30 mg/dL    Glucose, Fasting 106 (H) 65 - 99 mg/dL    Calcium 9.3 8.4 - 10.2 mg/dL    AST 11 (L) 13 - 39 U/L    ALT 11 7 - 52 U/L    Alkaline Phosphatase 66 34 - 104 U/L    Total Protein 7.8 6.4 - 8.4 g/dL    Albumin 4.2 3.5 - 5.0 g/dL    Total Bilirubin 0.61 0.20 - 1.00 mg/dL    eGFR 100 ml/min/1.73sq m   Protime-INR    Collection Time: 05/17/24 11:38 AM   Result Value Ref Range    Protime 13.6 11.6 - 14.5 seconds    INR 0.98 0.84 - 1.19   APTT    Collection Time: 05/17/24 11:38 AM   Result Value Ref Range    PTT 30 23 - 37 seconds           Assessment & Recommendations:     1. Pre-op examination  -     POCT ECG  2. Right inguinal hernia  3. Type 2 diabetes mellitus with obesity  (HCC)  Comments:  A1c 7.4  4. Screening for cardiovascular condition  -     POCT ECG        Pre-Op Evaluation Assessment  58 y.o. male with planned surgery: Right inguinal Hernia repair    Known risk factors for perioperative complications: Diabetes mellitus.        Current medications which may produce withdrawal symptoms if withheld perioperatively: none.    Pre-Op Evaluation Plan  1. Further preoperative workup as follows:   - None; no further preoperative work-up is required    2. Medication Management/Recommendations:   - None, continue medication regimen including morning of surgery, with sip of water    3. Prophylaxis for cardiac events with perioperative beta-blockers: not indicated.    4. Patient requires further consultation with: None    Clearance  Patient is CLEARED for  surgery at standard risk without any additional cardiac testing.      GASTON Roberts  [facility] PRIMARY [address]  [facility] [address]  Phone#  [phone]  Fax#  [phone]

## 2024-06-03 DIAGNOSIS — K21.9 GASTROESOPHAGEAL REFLUX DISEASE WITHOUT ESOPHAGITIS: ICD-10-CM

## 2024-06-03 DIAGNOSIS — I10 ESSENTIAL HYPERTENSION: ICD-10-CM

## 2024-06-03 RX ORDER — OMEPRAZOLE 20 MG/1
20 CAPSULE, DELAYED RELEASE ORAL DAILY
Qty: 90 CAPSULE | Refills: 1 | Status: SHIPPED | OUTPATIENT
Start: 2024-06-03

## 2024-06-03 RX ORDER — LISINOPRIL 10 MG/1
10 TABLET ORAL DAILY
Qty: 90 TABLET | Refills: 1 | Status: SHIPPED | OUTPATIENT
Start: 2024-06-03

## 2024-07-11 ENCOUNTER — RA CDI HCC (OUTPATIENT)
Dept: OTHER | Facility: HOSPITAL | Age: 59
End: 2024-07-11

## 2024-07-11 NOTE — PROGRESS NOTES
HCC coding opportunities          Chart Reviewed number of suggestions sent to Provider: 2     Patients Insurance        Commercial Insurance: [employer]     E11.65  E11.42

## 2024-07-29 ENCOUNTER — OFFICE VISIT (OUTPATIENT)
Dept: FAMILY MEDICINE CLINIC | Facility: CLINIC | Age: 59
End: 2024-07-29
Payer: COMMERCIAL

## 2024-07-29 VITALS
TEMPERATURE: 98.1 F | OXYGEN SATURATION: 96 % | WEIGHT: 206 LBS | DIASTOLIC BLOOD PRESSURE: 70 MMHG | HEIGHT: 70 IN | RESPIRATION RATE: 17 BRPM | HEART RATE: 82 BPM | SYSTOLIC BLOOD PRESSURE: 142 MMHG | BODY MASS INDEX: 29.49 KG/M2

## 2024-07-29 DIAGNOSIS — E66.9 TYPE 2 DIABETES MELLITUS WITH OBESITY  (HCC): ICD-10-CM

## 2024-07-29 DIAGNOSIS — E11.69 TYPE 2 DIABETES MELLITUS WITH OBESITY  (HCC): ICD-10-CM

## 2024-07-29 DIAGNOSIS — R20.2 PARESTHESIA OF FOOT, BILATERAL: ICD-10-CM

## 2024-07-29 DIAGNOSIS — Z00.00 ANNUAL PHYSICAL EXAM: Primary | ICD-10-CM

## 2024-07-29 DIAGNOSIS — I10 ESSENTIAL HYPERTENSION: ICD-10-CM

## 2024-07-29 PROCEDURE — 99396 PREV VISIT EST AGE 40-64: CPT | Performed by: NURSE PRACTITIONER

## 2024-07-29 PROCEDURE — 3078F DIAST BP <80 MM HG: CPT | Performed by: NURSE PRACTITIONER

## 2024-07-29 PROCEDURE — 3077F SYST BP >= 140 MM HG: CPT | Performed by: NURSE PRACTITIONER

## 2024-07-29 NOTE — PATIENT INSTRUCTIONS
"Patient Education     Routine physical for adults   The Basics   Written by the doctors and editors at [facility]   What is a physical? -- A physical is a routine visit, or \"check-up,\" with your doctor. You might also hear it called a \"wellness visit\" or \"preventive visit.\"  During each visit, the doctor will:   Ask about your physical and mental health   Ask about your habits, behaviors, and lifestyle   Do an exam   Give you vaccines if needed   Talk to you about any medicines you take   Give advice about your health   Answer your questions  Getting regular check-ups is an important part of taking care of your health. It can help your doctor find and treat any problems you have. But it's also important for preventing health problems.  A routine physical is different from a \"sick visit.\" A sick visit is when you see a doctor because of a health concern or problem. Since physicals are scheduled ahead of time, you can think about what you want to ask the doctor.  How often should I get a physical? -- It depends on your age and health. In general, for people age 21 years and older:   If you are younger than 50 years, you might be able to get a physical every 3 years.   If you are 50 years or older, your doctor might recommend a physical every year.  If you have an ongoing health condition, like diabetes or high blood pressure, your doctor will probably want to see you more often.  What happens during a physical? -- In general, each visit will include:   Physical exam - The doctor or nurse will check your height, weight, heart rate, and blood pressure. They will also look at your eyes and ears. They will ask about how you are feeling and whether you have any symptoms that bother you.   Medicines - It's a good idea to bring a list of all the medicines you take to each doctor visit. Your doctor will talk to you about your medicines and answer any questions. Tell them if you are having any side effects that bother you. You " "should also tell them if you are having trouble paying for any of your medicines.   Habits and behaviors - This includes:   Your diet   Your exercise habits   Whether you smoke, drink alcohol, or use drugs   Whether you are sexually active   Whether you feel safe at home  Your doctor will talk to you about things you can do to improve your health and lower your risk of health problems. They will also offer help and support. For example, if you want to quit smoking, they can give you advice and might prescribe medicines. If you want to improve your diet or get more physical activity, they can help you with this, too.   Lab tests, if needed - The tests you get will depend on your age and situation. For example, your doctor might want to check your:   Cholesterol   Blood sugar   Iron level   Vaccines - The recommended vaccines will depend on your age, health, and what vaccines you already had. Vaccines are very important because they can prevent certain serious or deadly infections.   Discussion of screening - \"Screening\" means checking for diseases or other health problems before they cause symptoms. Your doctor can recommend screening based on your age, risk, and preferences. This might include tests to check for:   Cancer, such as breast, prostate, cervical, ovarian, colorectal, prostate, lung, or skin cancer   Sexually transmitted infections, such as chlamydia and gonorrhea   Mental health conditions like depression and anxiety  Your doctor will talk to you about the different types of screening tests. They can help you decide which screenings to have. They can also explain what the results might mean.   Answering questions - The physical is a good time to ask the doctor or nurse questions about your health. If needed, they can refer you to other doctors or specialists, too.  Adults older than 65 years often need other care, too. As you get older, your doctor will talk to you about:   How to prevent falling at " home   Hearing or vision tests   Memory testing   How to take your medicines safely   Making sure that you have the help and support you need at home  All topics are updated as new evidence becomes available and our peer review process is complete.  This topic retrieved from [employer] on: May 02, 2024.  Topic 018397 Version 1.0  Release: 32.4.3 - C32.122  © 2024 UpToDate, Inc. and/or its affiliates. All rights reserved.  Consumer Information Use and Disclaimer   Disclaimer: This generalized information is a limited summary of diagnosis, treatment, and/or medication information. It is not meant to be comprehensive and should be used as a tool to help the user understand and/or assess potential diagnostic and treatment options. It does NOT include all information about conditions, treatments, medications, side effects, or risks that may apply to a specific patient. It is not intended to be medical advice or a substitute for the medical advice, diagnosis, or treatment of a health care provider based on the health care provider's examination and assessment of a patient's specific and unique circumstances. Patients must speak with a health care provider for complete information about their health, medical questions, and treatment options, including any risks or benefits regarding use of medications. This information does not endorse any treatments or medications as safe, effective, or approved for treating a specific patient. UpToDate, Inc. and its affiliates disclaim any warranty or liability relating to this information or the use thereof.The use of this information is governed by the Terms of Use, available at https://www.woltersDymantuwer.com/en/know/clinical-effectiveness-terms. 2024© UpToDate, Inc. and its affiliates and/or licensors. All rights reserved.  Copyright   © 2024 UpToDate, Inc. and/or its affiliates. All rights reserved.

## 2024-07-29 NOTE — PROGRESS NOTES
Adult Annual Physical  Name: Paresh Mosquera      : 1965      MRN: [id number]  Encounter Provider: GASTON Roberts  Encounter Date: 2024   Encounter department: [facility]    Assessment & Plan   1. Health care maintenance  2. Paresthesia of foot, bilateral  -     Vitamin B12; Future  -     Magnesium  3. Essential hypertension  -     Magnesium  -     Basic metabolic panel; Future  4. Type 2 diabetes mellitus with obesity  (HCC)  -     Hemoglobin A1C; Future; Expected date: 2024    Immunizations and preventive care screenings were discussed with patient today. Appropriate education was printed on patient's after visit summary.    Discussed risks and benefits of prostate cancer screening. We discussed the controversial history of PSA screening for prostate cancer in the [place] as well as the risk of over detection and over treatment of prostate cancer by way of PSA screening.  The patient understands that PSA blood testing is an imperfect way to screen for prostate cancer and that elevated PSA levels in the blood may also be caused by infection, inflammation, prostatic trauma or manipulation, urological procedures, or by benign prostatic enlargement.    The role of the digital rectal examination in prostate cancer screening was also discussed and I discussed with him that there is large interobserver variability in the findings of digital rectal examination.    Counseling:  Alcohol/drug use: discussed moderation in alcohol intake, the recommendations for healthy alcohol use, and avoidance of illicit drug use.  Dental Health: discussed importance of regular tooth brushing, flossing, and dental visits.  Injury prevention: discussed safety/seat belts, safety helmets, smoke detectors, carbon dioxide detectors, and smoking near bedding or upholstery.  Sexual health: discussed sexually transmitted diseases, partner selection, use of condoms, avoidance of unintended  pregnancy, and contraceptive alternatives.  Exercise: the importance of regular exercise/physical activity was discussed. Recommend exercise 3-5 times per week for at least 30 minutes.       Tobacco Cessation Counseling: Tobacco cessation counseling was provided. The patient is sincerely urged to quit consumption of tobacco. He is not ready to quit tobacco.         History of Present Illness     Adult Annual Physical:  Patient presents for annual physical.     Diet and Physical Activity:  - Diet/Nutrition: limited junk food. dm diet  - Exercise: no formal exercise.    General Health:  - Sleep: 4-6 hours of sleep on average.  - Hearing: normal hearing bilateral ears.  - Vision: no vision problems.  - Dental: no dental visits for > 1 year.     Health:  - History of STDs: no.   - Urinary symptoms: erectile dysfunction.     Advanced Care Planning:  - Has an advanced directive?: no    - Has a durable medical POA?: no    - ACP document given to patient?: no      Review of Systems   Constitutional:  Negative for fatigue, fever and unexpected weight change.   HENT:  Negative for trouble swallowing.    Respiratory:  Negative for cough and shortness of breath.    Cardiovascular:  Negative for chest pain, palpitations and leg swelling.   Gastrointestinal:  Negative for abdominal pain and blood in stool.   Endocrine: Negative.    Genitourinary:  Negative for decreased urine volume, difficulty urinating and hematuria.   Musculoskeletal:  Positive for arthralgias and back pain. Negative for gait problem.   Skin:  Negative for pallor.   Neurological:  Negative for dizziness, syncope and weakness.        Paresthesia b/l feet   Hematological:  Does not bruise/bleed easily.   Psychiatric/Behavioral:  Negative for confusion.      Medical History Reviewed by provider this encounter:  Tobacco  Allergies  Meds  Problems  Med Hx  Surg Hx  Fam Hx       Current Outpatient Medications on File Prior to Visit   Medication Sig Dispense  "Refill    Empagliflozin (Jardiance) 25 MG TABS Take 1 tablet (25 mg total) by mouth every morning 90 tablet 3    glimepiride (AMARYL) 4 mg tablet Take 1 tablet (4 mg total) by mouth daily with breakfast 90 tablet 1    Glucose Blood (BLOOD GLUCOSE TEST STRIPS) STRP Check sugar bid 100 strip 4    lisinopril (ZESTRIL) 10 mg tablet TAKE 1 TABLET(10 MG) BY MOUTH DAILY 90 tablet 1    omeprazole (PriLOSEC) 20 mg delayed release capsule TAKE 1 CAPSULE(20 MG) BY MOUTH DAILY 90 capsule 1    pioglitazone (ACTOS) 15 mg tablet Take 1 tablet (15 mg total) by mouth daily 90 tablet 1    pregabalin (LYRICA) 75 mg capsule Take 75 mg by mouth 2 (two) times a day      tadalafil (CIALIS) 20 MG tablet Take 1 tablet (20 mg total) by mouth daily as needed for erectile dysfunction 13 tablet 3    atorvastatin (LIPITOR) 10 mg tablet Take 1 tablet (10 mg total) by mouth daily (Patient not taking: Reported on 12/14/2023) 90 tablet 1    oxyCODONE-acetaminophen (PERCOCET) 5-325 mg per tablet Take 1 tablet by mouth every 6 (six) hours as needed every 6 to 8 hours as needed for pain (Patient not taking: Reported on 7/29/2024)       No current facility-administered medications on file prior to visit.        Objective     /70 (BP Location: Left arm, Patient Position: Sitting, Cuff Size: Large)   Pulse 82   Temp 98.1 °F (36.7 °C) (Temporal)   Resp 17   Ht 5' 10\" (1.778 m)   Wt 93.4 kg (206 lb)   SpO2 96%   BMI 29.56 kg/m²     Physical Exam  Vitals and nursing note reviewed.   Constitutional:       General: He is not in acute distress.     Appearance: Normal appearance.   HENT:      Head: Normocephalic and atraumatic.   Eyes:      Pupils: Pupils are equal, round, and reactive to light.   Neck:      Vascular: No carotid bruit.   Cardiovascular:      Rate and Rhythm: Normal rate and regular rhythm.      Pulses: Normal pulses. no weak pulses.           Dorsalis pedis pulses are 2+ on the right side and 2+ on the left side.        Posterior " tibial pulses are 2+ on the right side and 2+ on the left side.      Heart sounds: Normal heart sounds.   Pulmonary:      Effort: Pulmonary effort is normal.      Breath sounds: Normal breath sounds.   Abdominal:      General: Bowel sounds are normal.      Palpations: Abdomen is soft.      Tenderness: There is no abdominal tenderness.   Musculoskeletal:      Cervical back: Normal range of motion and neck supple.      Right lower leg: No edema.      Left lower leg: No edema.   Feet:      Right foot:      Skin integrity: No ulcer, skin breakdown, erythema, warmth, callus or dry skin.      Left foot:      Skin integrity: No ulcer, skin breakdown, erythema, warmth, callus or dry skin.   Lymphadenopathy:      Cervical: No cervical adenopathy.   Skin:     General: Skin is warm and dry.   Neurological:      General: No focal deficit present.      Mental Status: He is alert. Mental status is at baseline.   Psychiatric:         Mood and Affect: Mood normal.         Behavior: Behavior normal.     Diabetic Foot Exam    Patient's shoes and socks removed.    Right Foot/Ankle   Right Foot Inspection  Skin Exam: skin normal and skin intact. No dry skin, no warmth, no callus, no erythema, no maceration, no abnormal color, no pre-ulcer, no ulcer and no callus.     Toe Exam: ROM and strength within normal limits.     Sensory   Monofilament testing: intact    Vascular  Capillary refills: < 3 seconds  The right DP pulse is 2+. The right PT pulse is 2+.     Left Foot/Ankle  Left Foot Inspection  Skin Exam: skin normal and skin intact. No dry skin, no warmth, no erythema, no maceration, normal color, no pre-ulcer, no ulcer and no callus.     Toe Exam: ROM and strength within normal limits.     Sensory   Monofilament testing: intact    Vascular  Capillary refills: < 3 seconds  The left DP pulse is 2+. The left PT pulse is 2+.     Assign Risk Category  No deformity present  No loss of protective sensation  No weak pulses  Risk: 0

## 2024-11-12 DIAGNOSIS — E66.9 TYPE 2 DIABETES MELLITUS WITH OBESITY  (HCC): ICD-10-CM

## 2024-11-12 DIAGNOSIS — E11.69 TYPE 2 DIABETES MELLITUS WITH OBESITY  (HCC): ICD-10-CM

## 2024-11-13 RX ORDER — PIOGLITAZONE 15 MG/1
15 TABLET ORAL DAILY
Qty: 90 TABLET | Refills: 1 | Status: SHIPPED | OUTPATIENT
Start: 2024-11-13

## 2024-11-21 ENCOUNTER — RA CDI HCC (OUTPATIENT)
Dept: OTHER | Facility: HOSPITAL | Age: 59
End: 2024-11-21

## 2024-11-21 NOTE — PROGRESS NOTES
HCC coding opportunities          Chart Reviewed number of suggestions sent to Provider: 3  E11.65  E11.42  E11.36     Patients Insurance        Commercial Insurance: [employer]

## 2024-11-27 ENCOUNTER — OFFICE VISIT (OUTPATIENT)
Dept: FAMILY MEDICINE CLINIC | Facility: CLINIC | Age: 59
End: 2024-11-27
Payer: COMMERCIAL

## 2024-11-27 VITALS
HEART RATE: 74 BPM | SYSTOLIC BLOOD PRESSURE: 130 MMHG | OXYGEN SATURATION: 98 % | TEMPERATURE: 97.2 F | WEIGHT: 203 LBS | DIASTOLIC BLOOD PRESSURE: 70 MMHG | RESPIRATION RATE: 17 BRPM | BODY MASS INDEX: 29.06 KG/M2 | HEIGHT: 70 IN

## 2024-11-27 DIAGNOSIS — E11.65 TYPE 2 DIABETES MELLITUS WITH HYPERGLYCEMIA, WITHOUT LONG-TERM CURRENT USE OF INSULIN (HCC): ICD-10-CM

## 2024-11-27 DIAGNOSIS — I10 ESSENTIAL HYPERTENSION: ICD-10-CM

## 2024-11-27 DIAGNOSIS — E11.69 TYPE 2 DIABETES MELLITUS WITH OBESITY  (HCC): Primary | ICD-10-CM

## 2024-11-27 DIAGNOSIS — E66.9 TYPE 2 DIABETES MELLITUS WITH OBESITY  (HCC): Primary | ICD-10-CM

## 2024-11-27 DIAGNOSIS — M48.062 SPINAL STENOSIS OF LUMBAR REGION WITH NEUROGENIC CLAUDICATION: ICD-10-CM

## 2024-11-27 DIAGNOSIS — E78.5 DYSLIPIDEMIA: ICD-10-CM

## 2024-11-27 LAB — SL AMB POCT HEMOGLOBIN AIC: 10.2 (ref ?–6.5)

## 2024-11-27 PROCEDURE — 99214 OFFICE O/P EST MOD 30 MIN: CPT | Performed by: NURSE PRACTITIONER

## 2024-11-27 PROCEDURE — 83036 HEMOGLOBIN GLYCOSYLATED A1C: CPT | Performed by: NURSE PRACTITIONER

## 2024-11-27 RX ORDER — TRAMADOL HYDROCHLORIDE 50 MG/1
50 TABLET ORAL
Qty: 30 TABLET | Refills: 1 | Status: SHIPPED | OUTPATIENT
Start: 2024-11-27

## 2024-11-27 RX ORDER — LISINOPRIL 10 MG/1
10 TABLET ORAL DAILY
Qty: 30 TABLET | Refills: 11 | Status: SHIPPED | OUTPATIENT
Start: 2024-11-27

## 2024-11-27 RX ORDER — GLIMEPIRIDE 4 MG/1
4 TABLET ORAL
Qty: 30 TABLET | Refills: 11 | Status: SHIPPED | OUTPATIENT
Start: 2024-11-27 | End: 2025-11-22

## 2024-11-27 RX ORDER — PIOGLITAZONE 15 MG/1
15 TABLET ORAL DAILY
Qty: 30 TABLET | Refills: 11 | Status: SHIPPED | OUTPATIENT
Start: 2024-11-27

## 2024-11-27 NOTE — PROGRESS NOTES
Name: Paresh Mosquera      : 1965      MRN: [id number]  Encounter Provider: GASTON Roberts  Encounter Date: 2024   Encounter department: [facility]  :  Assessment & Plan  Type 2 diabetes mellitus with obesity  (HCC)  Had a lengthy discussion with Kin  Blood pressure again not controlled  He admits to noncompliance and dietary indiscretion due to depression and recent death of his family pet  He is frustrated over ongoing back pain and mobility limitations.  He had back surgery and was hopeful that it would improve his mobility and stamina however it has not.  He verbalizes understanding of my concerns and is willing to resume all of his medicines as well as getting back to diabetic diet  Recheck in 4 months  Lab Results   Component Value Date    HGBA1C 10.2 (A) 2024       Orders:    POCT hemoglobin A1c    Empagliflozin (Jardiance) 25 MG TABS; Take 1 tablet (25 mg total) by mouth every morning    glimepiride (AMARYL) 4 mg tablet; Take 1 tablet (4 mg total) by mouth daily with breakfast    pioglitazone (ACTOS) 15 mg tablet; Take 1 tablet (15 mg total) by mouth daily    Hemoglobin A1C; Future    Basic metabolic panel; Future    Albumin / creatinine urine ratio; Future    Essential hypertension  Blood pressure controlled today  Orders:    lisinopril (ZESTRIL) 10 mg tablet; Take 1 tablet (10 mg total) by mouth daily    Basic metabolic panel; Future    CBC and Platelet; Future    Dyslipidemia  Patient agreeable to resume his statin       Spinal stenosis of lumbar region with neurogenic claudication  Refractory low back pain with right leg neuropathy despite back surgery  It is causing insomnia  Will treat with occasional tramadol at bedtime  Advised he is not to use any other pain medication while taking this medicine  Orders:    traMADol (Ultram) 50 mg tablet; Take 1 tablet (50 mg total) by mouth daily at bedtime as needed for moderate pain    Type 2 diabetes mellitus with  hyperglycemia, without long-term current use of insulin ([facility])    Lab Results   Component Value Date    HGBA1C 10.2 (A) 11/27/2024                   History of Present Illness     59-year-old male with history of diabetes, hypertension, dyslipidemia, GERD, spinal stenosis with radiculitis status post back surgery presents for follow-up  He reports that he has been in his usual state of health    He endorses that his back is no better no worse.  He is frustrated that surgery did not help him.  It is impairing his sleep.  He is not able to get comfortable.  He has returned to work but he struggles.  His surgeon advises there is no more that can be done    He endorses that he is occasionally noncompliant with his medications  Diabetes was controlled at her last visit however today is no longer under good control  He admits to noncompliance as his family experience to the death of the family dog which was very painful for the family        Review of Systems   Constitutional:  Positive for fatigue. Negative for fever.   Respiratory: Negative.     Cardiovascular: Negative.    Gastrointestinal:  Negative for abdominal pain and blood in stool.   Endocrine: Negative.    Neurological:  Positive for dizziness and numbness (neurology). Negative for weakness.     Medical History Reviewed by provider this encounter:  Tobacco  Allergies  Meds  Problems  Med Hx  Surg Hx  Fam Hx     .  Current Outpatient Medications on File Prior to Visit   Medication Sig Dispense Refill    Glucose Blood (BLOOD GLUCOSE TEST STRIPS) STRP Check sugar bid 100 strip 4    omeprazole (PriLOSEC) 20 mg delayed release capsule TAKE 1 CAPSULE(20 MG) BY MOUTH DAILY 90 capsule 1    pregabalin (LYRICA) 75 mg capsule Take 75 mg by mouth 2 (two) times a day      tadalafil (CIALIS) 20 MG tablet Take 1 tablet (20 mg total) by mouth daily as needed for erectile dysfunction 13 tablet 3    [DISCONTINUED] Empagliflozin (Jardiance) 25 MG TABS Take 1 tablet (25 mg  "total) by mouth every morning 90 tablet 3    [DISCONTINUED] glimepiride (AMARYL) 4 mg tablet Take 1 tablet (4 mg total) by mouth daily with breakfast 90 tablet 1    [DISCONTINUED] lisinopril (ZESTRIL) 10 mg tablet TAKE 1 TABLET(10 MG) BY MOUTH DAILY 90 tablet 1    [DISCONTINUED] pioglitazone (ACTOS) 15 mg tablet TAKE 1 TABLET(15 MG) BY MOUTH DAILY 90 tablet 1    atorvastatin (LIPITOR) 10 mg tablet Take 1 tablet (10 mg total) by mouth daily (Patient not taking: Reported on 11/27/2024) 90 tablet 1    [DISCONTINUED] oxyCODONE-acetaminophen (PERCOCET) 5-325 mg per tablet Take 1 tablet by mouth every 6 (six) hours as needed every 6 to 8 hours as needed for pain (Patient not taking: Reported on 11/27/2024)       No current facility-administered medications on file prior to visit.         Objective   /70 (BP Location: Left arm, Patient Position: Sitting, Cuff Size: Large)   Pulse 74   Temp (!) 97.2 °F (36.2 °C) (Temporal)   Resp 17   Ht 5' 10\" (1.778 m)   Wt 92.1 kg (203 lb)   SpO2 98%   BMI 29.13 kg/m²      Physical Exam  Vitals and nursing note reviewed.   Constitutional:       General: He is not in acute distress.     Appearance: Normal appearance.   HENT:      Head: Normocephalic and atraumatic.   Eyes:      Pupils: Pupils are equal, round, and reactive to light.   Neck:      Vascular: No carotid bruit.   Cardiovascular:      Rate and Rhythm: Normal rate and regular rhythm.      Pulses: Normal pulses.           Dorsalis pedis pulses are 2+ on the right side and 2+ on the left side.        Posterior tibial pulses are 2+ on the right side and 2+ on the left side.      Heart sounds: Normal heart sounds.   Pulmonary:      Effort: Pulmonary effort is normal.      Breath sounds: Normal breath sounds.   Abdominal:      General: Bowel sounds are normal.      Palpations: Abdomen is soft.      Tenderness: There is no abdominal tenderness.   Musculoskeletal:      Right lower leg: No edema.      Left lower leg: No " edema.   Feet:      Right foot:      Skin integrity: No ulcer, skin breakdown, erythema, warmth, callus or dry skin.      Left foot:      Skin integrity: No ulcer, skin breakdown, erythema, warmth, callus or dry skin.   Lymphadenopathy:      Cervical: No cervical adenopathy.   Skin:     General: Skin is warm and dry.   Neurological:      General: No focal deficit present.      Mental Status: He is alert. Mental status is at baseline.   Psychiatric:         Mood and Affect: Mood normal.         Behavior: Behavior normal.

## 2024-11-27 NOTE — ASSESSMENT & PLAN NOTE
Blood pressure controlled today  Orders:    lisinopril (ZESTRIL) 10 mg tablet; Take 1 tablet (10 mg total) by mouth daily    Basic metabolic panel; Future    CBC and Platelet; Future

## 2024-11-27 NOTE — ASSESSMENT & PLAN NOTE
Had a lengthy discussion with Kin  Blood pressure again not controlled  He admits to noncompliance and dietary indiscretion due to depression and recent death of his family pet  He is frustrated over ongoing back pain and mobility limitations.  He had back surgery and was hopeful that it would improve his mobility and stamina however it has not.  He verbalizes understanding of my concerns and is willing to resume all of his medicines as well as getting back to diabetic diet  Recheck in 4 months  Lab Results   Component Value Date    HGBA1C 10.2 (A) 11/27/2024       Orders:    POCT hemoglobin A1c    Empagliflozin (Jardiance) 25 MG TABS; Take 1 tablet (25 mg total) by mouth every morning    glimepiride (AMARYL) 4 mg tablet; Take 1 tablet (4 mg total) by mouth daily with breakfast    pioglitazone (ACTOS) 15 mg tablet; Take 1 tablet (15 mg total) by mouth daily    Hemoglobin A1C; Future    Basic metabolic panel; Future    Albumin / creatinine urine ratio; Future

## 2025-01-29 ENCOUNTER — TELEPHONE (OUTPATIENT)
Dept: FAMILY MEDICINE CLINIC | Facility: CLINIC | Age: 60
End: 2025-01-29

## 2025-01-29 NOTE — TELEPHONE ENCOUNTER
LM for pt- Appt with Debbie Medel for 3/27/25 has been canceled and needs to be rescheduled, as provider no longer works Thursdays.

## 2025-03-21 ENCOUNTER — RA CDI HCC (OUTPATIENT)
Dept: OTHER | Facility: HOSPITAL | Age: 60
End: 2025-03-21

## 2025-03-21 NOTE — PROGRESS NOTES
HCC coding opportunities          Chart Reviewed number of suggestions sent to Provider: 3  E11.65  E11.42  E11.36     Patients Insurance        Commercial Insurance: [email or web address]

## 2025-03-28 ENCOUNTER — OFFICE VISIT (OUTPATIENT)
Dept: FAMILY MEDICINE CLINIC | Facility: CLINIC | Age: 60
End: 2025-03-28
Payer: COMMERCIAL

## 2025-03-28 VITALS
DIASTOLIC BLOOD PRESSURE: 74 MMHG | TEMPERATURE: 97.7 F | HEART RATE: 80 BPM | SYSTOLIC BLOOD PRESSURE: 138 MMHG | HEIGHT: 70 IN | RESPIRATION RATE: 17 BRPM | BODY MASS INDEX: 28.92 KG/M2 | WEIGHT: 202 LBS | OXYGEN SATURATION: 98 %

## 2025-03-28 DIAGNOSIS — G89.29 ACUTE EXACERBATION OF CHRONIC LOW BACK PAIN: ICD-10-CM

## 2025-03-28 DIAGNOSIS — E66.9 TYPE 2 DIABETES MELLITUS WITH OBESITY  (HCC): Primary | ICD-10-CM

## 2025-03-28 DIAGNOSIS — M54.16 LUMBAR RADICULOPATHY: ICD-10-CM

## 2025-03-28 DIAGNOSIS — F11.20 OPIOID DEPENDENCE, UNCOMPLICATED (HCC): ICD-10-CM

## 2025-03-28 DIAGNOSIS — E11.69 TYPE 2 DIABETES MELLITUS WITH OBESITY  (HCC): Primary | ICD-10-CM

## 2025-03-28 DIAGNOSIS — I10 ESSENTIAL HYPERTENSION: ICD-10-CM

## 2025-03-28 DIAGNOSIS — M54.50 ACUTE EXACERBATION OF CHRONIC LOW BACK PAIN: ICD-10-CM

## 2025-03-28 LAB — SL AMB POCT HEMOGLOBIN AIC: 9.5 (ref ?–6.5)

## 2025-03-28 PROCEDURE — 83036 HEMOGLOBIN GLYCOSYLATED A1C: CPT | Performed by: NURSE PRACTITIONER

## 2025-03-28 PROCEDURE — 99214 OFFICE O/P EST MOD 30 MIN: CPT | Performed by: NURSE PRACTITIONER

## 2025-03-28 NOTE — ASSESSMENT & PLAN NOTE
We had lengthy discussion again about noncompliance and diabetes  We discussed the consequences of chronically elevated blood sugars on all systems of the body  Patient endorses he understands and will do better  He will resume his medications immediately  He will get his blood work prior to next visit for recheck  I encouraged to strive for A1c 7.5  Lab Results   Component Value Date    HGBA1C 9.5 (A) 03/28/2025       Orders:    POCT hemoglobin A1c

## 2025-03-28 NOTE — PROGRESS NOTES
Name: Paresh Mosquera      : 1965      MRN: [id number]  Encounter Provider: GASTON Roberts  Encounter Date: 3/28/2025   Encounter department: [facility]  :  Assessment & Plan  Type 2 diabetes mellitus with obesity  (HCC)  We had lengthy discussion again about noncompliance and diabetes  We discussed the consequences of chronically elevated blood sugars on all systems of the body  Patient endorses he understands and will do better  He will resume his medications immediately  He will get his blood work prior to next visit for recheck  I encouraged to strive for A1c 7.5  Lab Results   Component Value Date    HGBA1C 9.5 (A) 2025       Orders:    POCT hemoglobin A1c    Essential hypertension  Blood pressure acceptable today       Opioid dependence, uncomplicated (HCC)  Takes occasional tramadol for ongoing low back pain related to back injury       Acute exacerbation of chronic low back pain         Lumbar radiculopathy  Causing paresthesia in bilateral extremities-specifically feet  I advised this likely is multifactorial including noncontrolled diabetes       The case discussed with patient using patient centered shared decision making.The patient was counseled regarding instructions for management,-- risk factor reductions,-- prognosis,-- impressions,-- risks and benefits of treatment options,-- importance of compliance with treatment. I have reviewed the instructions with the patient, answering all questions to his satisfaction.         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    Endocrine: Negative.    Musculoskeletal:  Positive for arthralgias and back pain.   Neurological:  Positive for numbness.   All other systems reviewed and are negative.      Objective   /74 (BP Location: Left arm, Patient Position: Sitting, Cuff Size: Large)   Pulse 80   Temp 97.7 °F (36.5 °C) (Temporal)   Resp 17   Ht 5' 10\" (1.778 m)   Wt 91.6 kg (202 lb)   SpO2 98%   BMI 28.98 kg/m²      Physical Exam  Vitals and nursing note reviewed.   Constitutional:       General: He is not in acute distress.     Appearance: Normal appearance.   HENT:      Head: Normocephalic and atraumatic.   Eyes:      Pupils: Pupils are equal, round, and reactive to light.   Neck:      Vascular: No carotid bruit.   Cardiovascular:      Rate and Rhythm: Normal rate and regular rhythm.      Pulses: Normal pulses.           Dorsalis pedis pulses are 2+ on the right side and 2+ on the left side.        Posterior tibial pulses are 2+ on the right side and 2+ on the left side.      Heart sounds: Normal heart sounds.   Pulmonary:      Effort: Pulmonary effort is normal.      Breath sounds: Normal breath sounds.   Abdominal:      General: Bowel sounds are normal.      Palpations: Abdomen is soft.      Tenderness: There is no abdominal tenderness.   Musculoskeletal:      Right lower leg: No edema.      Left lower leg: No edema.   Feet:      Right foot:      Skin integrity: No ulcer, skin breakdown, erythema, warmth, callus or dry skin.      Left foot:      Skin integrity: No ulcer, skin breakdown, erythema, warmth, callus or dry skin.   Lymphadenopathy:      Cervical: No " cervical adenopathy.   Skin:     General: Skin is warm and dry.   Neurological:      General: No focal deficit present.      Mental Status: He is alert. Mental status is at baseline.   Psychiatric:         Mood and Affect: Mood normal.         Behavior: Behavior normal.

## 2025-03-31 ENCOUNTER — TELEPHONE (OUTPATIENT)
Age: 60
End: 2025-03-31

## 2025-04-01 ENCOUNTER — TELEPHONE (OUTPATIENT)
Age: 60
End: 2025-04-01

## 2025-04-01 NOTE — TELEPHONE ENCOUNTER
Called pt and lvm informing both PE Certificate require eye doctor signature as well as FMLA are w front office and ready for p/u. Copy made to be scanned into chart.

## 2025-06-03 ENCOUNTER — DOCUMENTATION (OUTPATIENT)
Dept: ADMINISTRATIVE | Facility: OTHER | Age: 60
End: 2025-06-03

## 2025-08-01 ENCOUNTER — OFFICE VISIT (OUTPATIENT)
Dept: FAMILY MEDICINE CLINIC | Facility: CLINIC | Age: 60
End: 2025-08-01
Payer: COMMERCIAL

## 2025-08-01 VITALS
TEMPERATURE: 97.7 F | OXYGEN SATURATION: 99 % | HEIGHT: 70 IN | SYSTOLIC BLOOD PRESSURE: 130 MMHG | WEIGHT: 205.6 LBS | HEART RATE: 74 BPM | RESPIRATION RATE: 17 BRPM | BODY MASS INDEX: 29.43 KG/M2 | DIASTOLIC BLOOD PRESSURE: 70 MMHG

## 2025-08-01 DIAGNOSIS — Z00.00 ANNUAL PHYSICAL EXAM: ICD-10-CM

## 2025-08-01 DIAGNOSIS — E66.9 TYPE 2 DIABETES MELLITUS WITH OBESITY  (HCC): Primary | ICD-10-CM

## 2025-08-01 DIAGNOSIS — M54.16 LUMBAR RADICULOPATHY: ICD-10-CM

## 2025-08-01 DIAGNOSIS — E11.69 TYPE 2 DIABETES MELLITUS WITH OBESITY  (HCC): Primary | ICD-10-CM

## 2025-08-01 PROBLEM — F11.20 OPIOID DEPENDENCE, UNCOMPLICATED (HCC): Status: RESOLVED | Noted: 2025-03-28 | Resolved: 2025-08-01

## 2025-08-01 LAB — SL AMB POCT HEMOGLOBIN AIC: 7.3 (ref ?–6.5)

## 2025-08-01 PROCEDURE — 99396 PREV VISIT EST AGE 40-64: CPT | Performed by: NURSE PRACTITIONER

## 2025-08-01 PROCEDURE — 83036 HEMOGLOBIN GLYCOSYLATED A1C: CPT | Performed by: NURSE PRACTITIONER

## 2025-08-01 RX ORDER — ATORVASTATIN CALCIUM 10 MG/1
10 TABLET, FILM COATED ORAL DAILY
Qty: 90 TABLET | Refills: 1 | Status: SHIPPED | OUTPATIENT
Start: 2025-08-01

## 2025-08-01 RX ORDER — PREGABALIN 75 MG/1
75 CAPSULE ORAL 2 TIMES DAILY
Qty: 60 CAPSULE | Refills: 2 | Status: SHIPPED | OUTPATIENT
Start: 2025-08-01